# Patient Record
Sex: MALE | Race: WHITE | Employment: OTHER | URBAN - METROPOLITAN AREA
[De-identification: names, ages, dates, MRNs, and addresses within clinical notes are randomized per-mention and may not be internally consistent; named-entity substitution may affect disease eponyms.]

---

## 2022-09-30 NOTE — PROGRESS NOTES
10/4/22: New Pt here for Lt tongue lesion. Some discomfort when eating certain foods, some tingling. No difficulty swallowing. No change in voice. Drinks 3-4 bottles of water, 1-2 bottles of green tea, and very seldomly drinks alcohol. Weight is stable.

## 2022-10-04 ENCOUNTER — OFFICE VISIT (OUTPATIENT)
Dept: ENT CLINIC | Age: 67
End: 2022-10-04
Payer: MEDICARE

## 2022-10-04 VITALS — WEIGHT: 229 LBS | BODY MASS INDEX: 32.78 KG/M2 | HEIGHT: 70 IN

## 2022-10-04 DIAGNOSIS — C02.9 TONGUE CANCER (HCC): ICD-10-CM

## 2022-10-04 DIAGNOSIS — K14.8 TONGUE MASS: Primary | ICD-10-CM

## 2022-10-04 PROCEDURE — 1123F ACP DISCUSS/DSCN MKR DOCD: CPT | Performed by: OTOLARYNGOLOGY

## 2022-10-04 PROCEDURE — G8427 DOCREV CUR MEDS BY ELIG CLIN: HCPCS | Performed by: OTOLARYNGOLOGY

## 2022-10-04 PROCEDURE — G8417 CALC BMI ABV UP PARAM F/U: HCPCS | Performed by: OTOLARYNGOLOGY

## 2022-10-04 PROCEDURE — G8484 FLU IMMUNIZE NO ADMIN: HCPCS | Performed by: OTOLARYNGOLOGY

## 2022-10-04 PROCEDURE — 99204 OFFICE O/P NEW MOD 45 MIN: CPT | Performed by: OTOLARYNGOLOGY

## 2022-10-04 PROCEDURE — 31575 DIAGNOSTIC LARYNGOSCOPY: CPT | Performed by: OTOLARYNGOLOGY

## 2022-10-04 PROCEDURE — 1036F TOBACCO NON-USER: CPT | Performed by: OTOLARYNGOLOGY

## 2022-10-04 PROCEDURE — 3017F COLORECTAL CA SCREEN DOC REV: CPT | Performed by: OTOLARYNGOLOGY

## 2022-10-04 RX ORDER — LORATADINE 10 MG/1
10 TABLET ORAL DAILY
COMMUNITY

## 2022-10-04 RX ORDER — PHENOL 1.4 %
AEROSOL, SPRAY (ML) MUCOUS MEMBRANE
COMMUNITY
End: 2022-10-14

## 2022-10-04 RX ORDER — MULTIVITAMIN WITH IRON
100 TABLET ORAL DAILY
COMMUNITY

## 2022-10-04 RX ORDER — ACETAMINOPHEN 160 MG
1 TABLET,DISINTEGRATING ORAL DAILY
COMMUNITY

## 2022-10-04 RX ORDER — ZINC GLUCONATE 50 MG
100 TABLET ORAL 2 TIMES DAILY
COMMUNITY

## 2022-10-04 RX ORDER — MULTIVIT WITH MINERALS/LUTEIN
1000 TABLET ORAL DAILY
COMMUNITY

## 2022-10-04 NOTE — LETTER
Dear Dr Ryan Cadena, MD Teressa Dyson DDS     We had the pleasure of seeing Juvencio Born, 1955 here    on 10/4/2022  Please see below for review of care and plans. Chief complaint- No diagnosis found. History of Present Illness- 70 y/o male presents to our clinic for evaluation of tongue lesion. Left lateral tongue. Noticed approximately 2-3 months ago. Non painful, non tender. Tolerating PO intake. Denies cervical lymphadenopathy. Denies weight loss. Denies appetite change. Denies difficulty swallowing or drinking. Has had previous biopsy of tongue lesion which came back benign. Non smoker. Recent biopsy- pos scca    Review of Systems- No drainage, discharge, or headache. Complete 10 system ROS completed and negative except as noted above. Physical Examination-   Vital Signs-Ht 5' 10\" (1.778 m)   Wt 229 lb (103.9 kg)   BMI 32.86 kg/m²     Ears- Tympanic membranes clear bilaterally. No middle ear effusion. No pre or post auricular tenderness. Nose- Nasal mucosa clear and dry. No significant septal deviation or inferior turbinate hypertrophy. Oral Cavity/Oropharynx- Floor of mouth and tongue are soft and nontender. No posterior pharyngeal erythema. + gag reflex left lateral tongue/floor of mouth ~2cm plaque like lesion with submucosal firmness. Neck- Soft and nontender. No masses, lesions, lymphadenopathy, or thyroid nodules appreciated. Cranial Nerve- Cranial nerves II to XII intact. Extraocular muscles intact. No gross motor visual deficits. No spontaneous nystagmus. Face- No facial skin tenderness to palpation. Heart- No cyanosis, regular  Lungs- No stridor, no intercostal accessory muscle use  General- The patient is in no acute distress. A&O x3    Nasopharyngoscopy  Procedure note- after pt verbally consented, was sprayed nasally with 1:1 neosynephrine and xylocaine. Scope was passed and found nasal cavity with no lesion.  nasopharynx clear, tonsil wnl without asymmetry, tongue mobile and no masses, vocal cords mobile bilaterally with full ab and ad duction. Hypopharynx clear, open and no masses. Medical Decision Making and Treatment Plan. 1. Pt seen and examined, scope exam with grossly normal findings. 2. FNA of tongue done in office today. 3. Will schedule for panendoscopy. 4. Follow up in 1 week for path results review. 5.  R/B/A of surgery discussed with pt. Pt understood, consent signed, and would like to proceed to surgery. No personal or family history of bleeding or adverse reaction to anesthesia. 6. Suspect will  need upfront surgical therapy with recon and then adjuvant therapy based upon pathology  7. Present at tumor board  8. Pan consult      I spent 50 minutes with the patients care and >50% of this time on counseling or coordinating care    Thank you for the opportunity to take part in the care of this very pleasant patient, Juan Carlos Coppolaelin  Sincerely,            Mor Rosales.  Fernando Castro M.D., Ph.D., [facility]   Department of Otolaryngology-Head and Neck Surgery  Chief of Head & Neck Surgical Oncology  Director Head & Neck Oncology Service Line

## 2022-10-04 NOTE — PATIENT INSTRUCTIONS
SURGERY:_____/_____/_____    Nothing to eat or drink after midnight the night before surgery unless surgery is at [facility] or otherwise instructed by the hospital.    DO NOT TAKE ANY ASPIRIN PRODUCTS 7 days prior to surgery. Tylenol only. No Advil, Motrin, Aleve, or Ibuprofen. IF YOU ARE ON BLOOD THINNERS (PLAVIX, COUMADIN, ELIQUIS ETC) THESE WILL ALSO NEED TO BE HELD. Any illegal drugs in your system (including Marijuana even if legally prescribed) will result in your surgery being cancelled. Please be sure to check with our office or the hospital on time frame for the drugs to be out of your system. SHOULD YOUR INSURANCE CHANGE AT ANY TIME YOU MUST CONTACT OUR OFFICE. FAILURE TO DO SO MAY RESULT IN YOUR SURGERY BEING RESCHEDULED OR YOU MAY BE CHARGED AS SELF-PAY. Due to the high demand for surgery at our practice, if you cancel or reschedule your surgery two (2) times we may not reschedule you. If you need FMLA or Short Term Disability paperwork completed for your surgery, please complete your portion, ensure your name and date of birth are on them and fax them to [phone] asap. Paperwork can take up to 2 weeks to be completed. If you have any questions or concerns regarding your surgery, please contact the Surgery SchedulerFlatricia Ronquillo at [phone] option 2. If you need medical clearance, you are responsible to contact your physician(s) to schedule an appointment for clearance. If clearance is not completed within 30 days of your surgery it may be cancelled. Our office will fax the appropriate forms that need to be completed to your physician(s). The location of your surgery will call you the day prior to your surgery date to let you know what time you have to be there and any other details. (they usually don't call until late afternoon- early evening.)- IF YOU HAVE QUESTIONS REGARDING THE TIME OF YOUR SURGERY, PLEASE CALL THE FACILITY YOU ARE SCHEDULED AT.            [facility], [facility] will call you a couple days prior to surgery and give you further instructions, if you have any questions, you can reach them at [phone] (per Pre-Admission testing, EKG is required for all patients age 53+, have a diagnosis of hypertension, diabetes, or on dialysis). [address], [address] [facility], [place] will call you a couple days prior to surgery and give you further instructions, if you have any questions, you can reach them at [phone] (per Pre-Admission testing, EKG is required for all patients age 53+, have a diagnosis of hypertension, diabetes, or on dialysis). [address] [facility] Angelina Ramirez will call you a couple days prior to surgery and give you further instructions, if you have any questions, you can reach them at [phone] (per Pre-Admission testing, EKG is required for all patients age 53+, have a diagnosis of hypertension, diabetes, or on dialysis). [facility]), [address],  [facility], [address] [place] [address] will call you a couple days prior to surgery and give you further instructions, if you have any questions, you can reach them at [phone]      Pre-Surgery/Anesthesia Video ([facility] ONLY)  Located on [address]:  1. Scroll over Health Information  2. Select Audio and Video  3. Select [employer]  4. Select Your child and Anesthesia  5.  Select Pre surgery [facility]    FOOD RESTRICTIONS--[facility] ONLY  Solid Food/Milk Products --------- Stop 8 hours prior to Surgery  Formula --------- Stop 6 hours prior to Surgery  Breast Milk ------- Stop 4 hours prior to Surgery  Clear liquids (water, Gatorade, Pedialyte) - Stop 2 hours prior to Surgery

## 2022-10-04 NOTE — PROGRESS NOTES
Dear MD Royal Martinez DDS     We had the pleasure of seeing Zara Martinez, 1955 here    on 10/4/2022  Please see below for review of care and plans. Chief complaint- No diagnosis found. History of Present Illness- 70 y/o male presents to our clinic for evaluation of tongue lesion. Left lateral tongue. Noticed approximately 2-3 months ago. Non painful, non tender. Tolerating PO intake. Denies cervical lymphadenopathy. Denies weight loss. Denies appetite change. Denies difficulty swallowing or drinking. Has had previous biopsy of tongue lesion which came back benign. Non smoker. Recent biopsy- pos scca    Review of Systems- No drainage, discharge, or headache. Complete 10 system ROS completed and negative except as noted above. Physical Examination-   Vital Signs-Ht 5' 10\" (1.778 m)   Wt 229 lb (103.9 kg)   BMI 32.86 kg/m²     Ears- Tympanic membranes clear bilaterally. No middle ear effusion. No pre or post auricular tenderness. Nose- Nasal mucosa clear and dry. No significant septal deviation or inferior turbinate hypertrophy. Oral Cavity/Oropharynx- Floor of mouth and tongue are soft and nontender. No posterior pharyngeal erythema. + gag reflex left lateral tongue/floor of mouth ~2cm plaque like lesion with submucosal firmness. Neck- Soft and nontender. No masses, lesions, lymphadenopathy, or thyroid nodules appreciated. Cranial Nerve- Cranial nerves II to XII intact. Extraocular muscles intact. No gross motor visual deficits. No spontaneous nystagmus. Face- No facial skin tenderness to palpation. Heart- No cyanosis, regular  Lungs- No stridor, no intercostal accessory muscle use  General- The patient is in no acute distress. A&O x3    Nasopharyngoscopy  Procedure note- after pt verbally consented, was sprayed nasally with 1:1 neosynephrine and xylocaine. Scope was passed and found nasal cavity with no lesion.  nasopharynx clear, tonsil wnl without asymmetry, tongue mobile and no masses, vocal cords mobile bilaterally with full ab and ad duction. Hypopharynx clear, open and no masses. Medical Decision Making and Treatment Plan. Pt seen and examined, scope exam with grossly normal findings. FNA of tongue done in office today. Will schedule for panendoscopy. Follow up in 1 week for path results review. R/B/A of surgery discussed with pt. Pt understood, consent signed, and would like to proceed to surgery. No personal or family history of bleeding or adverse reaction to anesthesia. Suspect will  need upfront surgical therapy with recon and then adjuvant therapy based upon pathology  Present at tumor board  Pan consult      I spent 50 minutes with the patients care and >50% of this time on counseling or coordinating care    Thank you for the opportunity to take part in the care of this very pleasant patient, Nir Mehta  Sincerely,            Rigoberto Washburn.  Isabela Dunn M.D., Ph.D., [facility]   Department of Otolaryngology-Head and Neck Surgery  Chief of Head & Neck Surgical Oncology  Director Head & Neck Oncology Service Line

## 2022-10-05 ENCOUNTER — TELEPHONE (OUTPATIENT)
Dept: ENT CLINIC | Age: 67
End: 2022-10-05

## 2022-10-05 ENCOUNTER — PREP FOR PROCEDURE (OUTPATIENT)
Dept: ENT CLINIC | Age: 67
End: 2022-10-05

## 2022-10-05 DIAGNOSIS — K14.8 TONGUE MASS: Primary | ICD-10-CM

## 2022-10-05 DIAGNOSIS — C04.1 MALIGNANT NEOPLASM OF LATERAL FLOOR OF MOUTH (HCC): ICD-10-CM

## 2022-10-05 DIAGNOSIS — Z01.812 PRE-OPERATIVE LABORATORY EXAMINATION: Primary | ICD-10-CM

## 2022-10-05 DIAGNOSIS — C02.9 TONGUE CANCER (HCC): Primary | ICD-10-CM

## 2022-10-05 DIAGNOSIS — Z01.812 PRE-OPERATIVE LABORATORY EXAMINATION: ICD-10-CM

## 2022-10-05 LAB
BUN BLDV-MCNC: 16 MG/DL (ref 6–23)
CREAT SERPL-MCNC: 1.1 MG/DL (ref 0.7–1.2)
GFR AFRICAN AMERICAN: >60
GFR NON-AFRICAN AMERICAN: >60 ML/MIN/1.73

## 2022-10-05 NOTE — TELEPHONE ENCOUNTER
Called spoke with patient CT and PET scan are scheduled on 10/10/22 at [facility] arrive at 2:00 pm nothing to eat or drink after 8:00 am

## 2022-10-05 NOTE — TELEPHONE ENCOUNTER
Prior Authorization Form:      DEMOGRAPHICS:                     Patient Name:  Ludy Osei  Patient :  1955            Insurance:  Payor: MEDICARE / Plan: MEDICARE PART A AND B / Product Type: *No Product type* /   Insurance ID Number:    Payer/Plan Subscr  Sex Relation Sub. Ins. ID Effective Group Num   1.  [address] [facility] 1955 Male Self [id number] 10/1/22                                    [address]          DIAGNOSIS & PROCEDURE:                       Procedure/Operation: Direct Laryngoscopy, Bronchoscopy, Esophagoscopy           CPT Code: 73600, 32374, 44724    Diagnosis:  Tongue mass    ICD10 Code: K14.8    Location:  [facility]    Surgeon:  Dr. Kika Stephen INFORMATION:                          Date: 10/13/22    Time: n/a              Anesthesia:  General                                                       Status:  Outpatient        Special Comments:  pathology       Electronically signed by Maia Torres MA on 10/5/2022 at 8:19 AM

## 2022-10-07 RX ORDER — ASPIRIN 81 MG/1
81 TABLET ORAL DAILY
COMMUNITY

## 2022-10-07 NOTE — PROGRESS NOTES
[address]   PRE-ADMISSION TESTING GENERAL INSTRUCTIONS  PAT Phone Number: [phone]      GENERAL INSTRUCTIONS:    [x] Antibacterial Soap Shower Night before and/or AM of surgery. [] CHG Wipes instruction sheet and wipes given. [] Hibiclens shower the night before and the morning of surgery.   -Do not use Hibiclens on your face or head. [x] Do not wear makeup, lotions, powders, deodorant. [x] Nothing by mouth after midnight; including gum, candy, mints, or water. [x] You may brush your teeth, gargle, but do NOT swallow water. [x] No tobacco products, illegal drugs, or alcohol within 24 hours of your surgery. [x] Jewelry or valuables should not be brought to the hospital. All body and/or tongue piercing's must be removed prior to arriving to hospital. No contact lens or hair pins. [x] Arrange transportation with a responsible adult  to and from the hospital. Arrange for someone to be with you for the remainder of the day and for 24 hours after your procedure due to having had anesthesia. -Who will be your  for transportation?___Karen_______________         -Who will be staying with you for 24 hrs after your procedure?____Karen______________  [x] Bring insurance card and photo ID.  [] Bring copy of living will or healthcare power of  papers to be placed in your electronic record. [] Urine Pregnancy test will be preformed the day of surgery. A specimen sample may be brought from home. [] Transfusion Bracelet: Please bring with you to hospital, day of surgery. PARKING INSTRUCTIONS:     [x] ARRIVAL DATE & TIME: 10/13/22 at 1300  [x] Enter into the [facility]. Two people may accompany you. Masks are not required but are recommended. [x] Parking Lot \"I\" is where you will park. It is located on the corner of [address] and [address]. The entrance is on [address].    Upon entering the parking lot, a voucher ticket will print    EDUCATION INSTRUCTIONS:        [] Knee or Hip replacement booklet & exercise pamphlets given. [] [address] placed in chart. [] Pre-admission Testing educational folder given  [] Incentive Spirometry,coughing & deep breathing exercises reviewed. [] Medication information sheet(s)   [] Fluoroscopy-Xray used in surgery reviewed with patient. Educational pamphlet placed in chart. [] Pain: Post-op pain is normal and to be expected. You will be asked to rate your pain from 0-10. [] Joint camp offered. [] Joint replacement booklets given.  [] Spine Navigator to see in PAT. [] Other:___________________________    MEDICATION INSTRUCTIONS:    [x] Bring a complete list of your medications, please write the last time you took the medicine, give this list to the nurse in Pre-Op. [x] Take only the following medications the morning of surgery with 1-2 ounces of water: meto  [x] Stop all herbal supplements and vitamins 5 days before surgery. Stop NSAIDS 7 days before surgery. [] DO NOT take any diabetic medicine the morning of surgery. Follow instructions for insulin the day before surgery. [] If you are diabetic and your blood sugar is low or you feel symptomatic, you may drink 1-2 ounces of apple juice or take a glucose tablet.            -The morning of your procedure, you may call the pre-op area if you have concerns about your blood sugar [phone]. [] Use your inhalers the morning of surgery. Bring your emergency inhaler with you day of surgery. [x] Follow physician instructions regarding any blood thinners you may be taking. Last dose of aspirin to be 10/6    WHAT TO EXPECT:    [x] The day of surgery you will be greeted and checked in by the [facility].  In addition, you will be registered in the [facility] by a Patient Access Representative. Please bring your photo ID and insurance card.  A nurse will greet you in accordance to the time you are needed in the pre-op area to prepare you for surgery. Please do not be discouraged if you are not greeted in the order you arrive as there are many variables that are involved in patient preparation. Your patience is greatly appreciated as you wait for your nurse. Please bring in items such as: books, magazines, newspapers, electronics, or any other items  to occupy your time in the waiting area. [x]  Delays may occur with surgery and staff will make a sincere effort to keep you informed of delays. If any delays occur with your procedure, we apologize ahead of time for your inconvenience as we recognize the value of your time.

## 2022-10-10 ENCOUNTER — HOSPITAL ENCOUNTER (OUTPATIENT)
Dept: NUCLEAR MEDICINE | Age: 67
Discharge: HOME | End: 2022-10-10
Payer: MEDICARE

## 2022-10-10 ENCOUNTER — HOSPITAL ENCOUNTER (OUTPATIENT)
Dept: CT IMAGING | Age: 67
Discharge: HOME | End: 2022-10-10
Payer: MEDICARE

## 2022-10-10 DIAGNOSIS — C04.1 MALIGNANT NEOPLASM OF LATERAL FLOOR OF MOUTH (HCC): ICD-10-CM

## 2022-10-10 DIAGNOSIS — C02.9 TONGUE CANCER (HCC): ICD-10-CM

## 2022-10-10 DIAGNOSIS — K14.8 TONGUE MASS: ICD-10-CM

## 2022-10-10 PROCEDURE — 70491 CT SOFT TISSUE NECK W/DYE: CPT

## 2022-10-10 PROCEDURE — 78815 PET IMAGE W/CT SKULL-THIGH: CPT

## 2022-10-10 PROCEDURE — 3430000000 HC RX DIAGNOSTIC RADIOPHARMACEUTICAL: Performed by: RADIOLOGY

## 2022-10-10 PROCEDURE — A9552 F18 FDG: HCPCS | Performed by: RADIOLOGY

## 2022-10-10 PROCEDURE — 6360000004 HC RX CONTRAST MEDICATION: Performed by: RADIOLOGY

## 2022-10-10 RX ORDER — FLUDEOXYGLUCOSE F 18 200 MCI/ML
15 INJECTION, SOLUTION INTRAVENOUS
Status: COMPLETED | OUTPATIENT
Start: 2022-10-10 | End: 2022-10-10

## 2022-10-10 RX ADMIN — FLUDEOXYGLUCOSE F 18 15 MILLICURIE: 200 INJECTION, SOLUTION INTRAVENOUS at 22:03

## 2022-10-10 RX ADMIN — IOPAMIDOL 75 ML: 755 INJECTION, SOLUTION INTRAVENOUS at 14:45

## 2022-10-10 NOTE — PROGRESS NOTES
Radiation Oncology Consult Note         10/11/2022    Clarisse Gee  69 y.o.   1955    REFERRING PROVIDER: Avery Anne    PCP:  Keya Olsen MD    CHIEF COMPLAINT: Lesion on Left Lateral Tongue    DIAGNOSIS: Left Lateral Tongue Lesion, ? malignancy    STAGING: Cancer Staging  No matching staging information was found for the patient. HISTORY OF PRESENT ILLNESS: Mr. Clarisse Gee  is a 69y.o. year old male during a 6-month follow-up with his family dentist Dr. Shameka Mcintyre a lesion was noted along the left lateral border of his tongue. He was sent to a local dermatologist for evaluation who ultimately referred him to Dr. Roberto Rodriguez who in July 2020 performed a resection of a left lateral tongue lesion. My review of her pathology dated 7/28/2020 noted squamous hyperplasia with hyperkeratosis without evidence of dysplasia or malignancy. To his current findings Dr. Kaveh Sawyer referred him to Dr. Avery Anne who on 10/4/2022 performed a fine-needle aspiration in the office. This revealed scant cells inconclusive for malignancy. He of the neck on 10/10/2022 notes a fullness involving the lateral tongue without adenopathy. A PET/CT on a similar date showed modest uptake involving the left lateral tongue without obvious lymphadenopathy. The patient is scheduled to undergo panendoscopy on 10/13/2022 and comes to us now for consideration of treatment option discussions. PAST MEDICAL HISTORY:      Diagnosis Date    Acid reflux disease     Hyperlipidemia     Hypertension        PAST SURGICAL HISTORY:      Procedure Laterality Date    COLONOSCOPY  09/27/2022    CYST REMOVAL      upper mid chest    HERNIA REPAIR  2021       No Known Allergies    MEDICATIONS:  Medications reviewed and reconciled.   Current Outpatient Medications   Medication Sig Dispense Refill    aspirin 81 MG EC tablet Take 81 mg by mouth daily      loratadine (CLARITIN) 10 MG tablet Take 10 mg by mouth daily PRN      Multiple Vitamins-Minerals (CENTRUM SILVER PO) Take by mouth      Ascorbic Acid (VITAMIN C) 1000 MG tablet Take 1,000 mg by mouth daily      zinc 50 MG TABS tablet Take 50 mg by mouth daily      Cholecalciferol (VITAMIN D3) 50 MCG (2000 UT) CAPS Take by mouth      vitamin B-6 (PYRIDOXINE) 100 MG tablet Take 100 mg by mouth daily      Cyanocobalamin (VITAMIN B-12) 5000 MCG SUBL Place under the tongue      Melatonin 10 MG TABS Take by mouth      atorvastatin (LIPITOR) 10 MG tablet Take 10 mg by mouth daily. omeprazole (PRILOSEC) 20 MG capsule Take 20 mg by mouth daily. metoprolol (LOPRESSOR) 50 MG tablet Take 100 mg by mouth daily       No current facility-administered medications for this encounter. FAMILY HISTORY:  Family History   Problem Relation Age of Onset    Breast Cancer Mother     Heart Disease Mother     Other Father         heart valve replacement       SOCIAL HISTORY:       reports that he has never smoked. He has never used smokeless tobacco..   reports no history of alcohol use. .   reports no history of drug use. REVIEW OF SYSTEMS:  Obtained from the patient, chart review and nursing assessment.   General ROS: positive for  - fatigue  Psychological ROS: positive for - anxiety  Ophthalmic ROS: negative  ENT ROS: positive for - oral lesions, left earache  negative for - epistaxis, hearing change, nasal discharge, nasal polyps, sore throat, tinnitus, visual changes, or vocal changes  Allergy and Immunology ROS: negative  Hematological and Lymphatic ROS: negative  Endocrine ROS: negative  Breast ROS: negative  Respiratory ROS: no cough, shortness of breath, or wheezing  Cardiovascular ROS: no chest pain or dyspnea on exertion  Gastrointestinal ROS: no abdominal pain, change in bowel habits, or black or bloody stools  Genito-Urinary ROS: no dysuria, trouble voiding, or hematuria  Musculoskeletal ROS: negative  Neurological ROS: no TIA or stroke symptoms  Dermatological ROS: negative    PHYSICAL EXAMINATION:      Vitals:    10/11/22 0855   BP: (!) 142/80   Pulse: 77   Resp: 18   Temp: 97.4 °F (36.3 °C)   TempSrc: Tympanic   Weight: 225 lb 9 oz (102.3 kg)       Wt Readings from Last 3 Encounters:   10/11/22 225 lb 9 oz (102.3 kg)   10/04/22 229 lb (103.9 kg)       KARNOSKY PERFORMANCE STATUS:      Constitutional: A well developed, well nourished 69 y.o. male who is alert, oriented, cooperative and in no apparent distress. EENT: EOMI SAM. No icterus. No conjunctival injections. External canals are patent and no discharge was appreciated. There is full mobility of the oral tongue. There is a soft tissue deficit involving the left lateral oral tongue. This area is firm but without an obvious mucosal lesion. The soft palate elevates bilaterally. Visual inspection of the remainder of the oral cavity is unremarkable. Oral and dentition is good. Bimanual palpation of the gingival buccal surfaces floor mouth base of tongue is unremarkable. Neck: Supple without thyromegaly or cervical lymphadenopathy. Respiratory: Breath sounds bilaterally were clear to auscultation. No wheezes, rhonchi or rales. Breasts: Deferred    Cardiovascular: Regular without murmur. Abdomen: Obese, rounded and soft without organomegaly. No rebound, guarding or  rigidity. Lymphatic: No lymphadenopathy. Musculoskeletal: Ambulates without assistance. No gross muscle weakness. Muscle size, tone and strength are normal. No involuntary movements. Extremities:  No lower extremity edema, ulcerations, tenderness, varicosities or erythema. Coordination appears adequate. Sensory function appears intact. Skin:  Warm and dry. Examination of color, turgor and pigmentation was relatively normal. No bruises or skin rashes. Neurological/Psychiatric: General behavior, level of consciousness, thought content is normal. The patient's emotional status is normal.  Cranial nerves II-XII are grossly intact.         RADIATION SAFETY AND ONCOLOGIC TREATMENT SUPPORT:    - Previous radiation history:  No  - History of autoimmune or connective tissue disease:  No  - Nutritional support/ PEG:  Not applicable  - Dental evaluation:  Not applicable  -  requested:  Not asked for.  - Oncology Nurse navigator requested:  - Transportation for daily treatment:  Self    TUMOR MARKERS: No results found for: PSA, CEA, , NP1886,     IMAGING REVIEWED:  10/10/2022 CT NECK      10/10/2022 PET-CT        PATHOLOGY REVIEWED:  10/4/2022 FNA Left Lateral Tongue:  DIAGNOSIS   INCONCLUSIVE FOR MALIGNANT CELLS     Few groups of severely atypical squamous cells present. Cellblock is noncontributory,     COMMENT:   The overall low cellularity precludes definitive interpretation. Intradepartmental consultation obtained. IMPRESSION:   68-year-old male with a left lateral oral tongue lesion, possible malignant    DISCUSSION/PLAN:     I had a extended discussion with Maryjane Dexter and his wife Reina James who was in attendance today. I reviewed his available clinical and radiographic history including the resection of a squamous hyperplasia from his left lateral oral tongue from 2020. On my examination today, I find fullness in this area without a mucosal lesion. My review of his CT of the neck is an PET/CT which has not officially been read demonstrates uptake in a suspicious lesion involving the area in question. I did review these with the patient today. The differential diagnosis ranges from a nonmalignant process including infection to cancer. Most Common cancer in this region would be a squamous cell carcinoma. Unfortunately his FNA was inconclusive and I understand he is scheduled to undergo a PET in there with repeat biopsy on 10/13/2022.     I did inform the patient that the next step would be to establish a diagnosis and the most common treatment options for early stage tumors in this location are summarized below:     Primary surgery and definitive radiation therapy are treatment options for early-stage oral tongue cancer. The vast majority of patients are treated with surgical resection although there has been a tradition of brachytherapy or external beam for well-differentiated tumors. Patients who are ineligible for en-bloc surgical resection (I.ie partial or total glossectomy), radiation therapy has been used although there has not been a head-to-head randomized trial of these 2 modalities. The 5-year overall survival of patients with early stage tongue cancers have been reported quite good from a single institution, 5-year survival using a population based approach (NC DB) more modest [Cancer. 2008;112(2):345; Luis Hernandez. 1998;19(1):24]. In the postoperative setting, depth of invasion (DOI) has recently gained prominence as a consideration for postoperative adjuvant therapy along with margin status, perineural invasion and lymphovascular space involvement [Clin Oncol (R Jacob Radiol). 1996;8(6):384; Radiother Oncol.1996;39(1):9]. It is clear that close and positive margins (less than 5 mm) may portend a worse prognosis and therefore every attempt should be made to obtain negative margins including attempts of re-resection if functionally and surgically feasible. In instances where this is not possible and there are high-risk features, adjuvant radiation plus or minus chemotherapy has used as shown to improve local control [ANGELA Otolaryngol Head Neck Surg. 2017;143(6):555]. We will discuss this case in our multidisciplinary head and neck tumor board with final recommendations to follow. That said, I believe surgical resection is a reasonable approach in this patient. Furthermore, a risk-adapted approach of postoperative radiation therapy can be used based on the pathologic findings post resection.   Specifically, depth of invasion, tumor size, perineural invasion, lymphovascular space involvement and bone involvement are all criteria that may, in combination, may result in adjuvant treatment [Head Neck. 2004;26(11):984; Cancer. 2013 Mar;119(6):1168-76. Epub 2012 Nov 26]. NCCN guidelines, version 2020 is used to help review treatment recommendations. Procedures and process involved with CT simulation and daily radiation treatments were explained. Toxicities, both expected and less common, were reviewed in detail. Questions were answered to their apparent satisfaction. I spent 65 minutes with this patient in our face-to-face encounter the majority of which was spent coordinating care specific to his case. Thank you for the opportunity to participate in multidisciplinary management of this remarkable and pleasant patient. Sameer Washington MD, [address]    Department of Radiation Oncology  [address]: [phone] (AQB: [phone])  [address] [place] [address]: [phone] (N: [phone])  [facility]:  [phone] (JRS:  [phone])    NOTE: This report was transcribed using voice recognition software. Every effort was made to ensure accuracy; however, inadvertent computerized transcription errors may be present.

## 2022-10-11 ENCOUNTER — HOSPITAL ENCOUNTER (OUTPATIENT)
Dept: RADIATION ONCOLOGY | Age: 67
Discharge: HOME | End: 2022-10-11
Payer: MEDICARE

## 2022-10-11 VITALS
BODY MASS INDEX: 32.36 KG/M2 | RESPIRATION RATE: 18 BRPM | WEIGHT: 225.56 LBS | SYSTOLIC BLOOD PRESSURE: 142 MMHG | DIASTOLIC BLOOD PRESSURE: 80 MMHG | HEART RATE: 77 BPM | TEMPERATURE: 97.4 F

## 2022-10-11 PROCEDURE — 99205 OFFICE O/P NEW HI 60 MIN: CPT | Performed by: SPECIALIST

## 2022-10-11 PROCEDURE — 99205 OFFICE O/P NEW HI 60 MIN: CPT

## 2022-10-11 NOTE — PROGRESS NOTES
Rc Cervantes  1955 69 y.o. Referring Physician: Cristofer Marcos    PCP: Maricel Moon MD     Vitals:    10/11/22 0855   BP: (!) 142/80   Pulse: 77   Resp: 18   Temp: 97.4 °F (36.3 °C)        Wt Readings from Last 3 Encounters:   10/11/22 225 lb 9 oz (102.3 kg)   10/04/22 229 lb (103.9 kg)        Body mass index is 32.36 kg/m². Chief Complaint: No chief complaint on file. Cancer Staging  No matching staging information was found for the patient. Prior Radiation Therapy? NO    Concurrent Chemo/radiation? NO    Prior Chemotherapy? NO    Prior Hormonal Therapy? NO    Head and Neck Cancer? No, patient does NOT have HN cancer. L      Current Outpatient Medications   Medication Sig Dispense Refill    aspirin 81 MG EC tablet Take 81 mg by mouth daily      loratadine (CLARITIN) 10 MG tablet Take 10 mg by mouth daily PRN      Multiple Vitamins-Minerals (CENTRUM SILVER PO) Take by mouth      Ascorbic Acid (VITAMIN C) 1000 MG tablet Take 1,000 mg by mouth daily      zinc 50 MG TABS tablet Take 50 mg by mouth daily      Cholecalciferol (VITAMIN D3) 50 MCG (2000 UT) CAPS Take by mouth      vitamin B-6 (PYRIDOXINE) 100 MG tablet Take 100 mg by mouth daily      Cyanocobalamin (VITAMIN B-12) 5000 MCG SUBL Place under the tongue      Melatonin 10 MG TABS Take by mouth      atorvastatin (LIPITOR) 10 MG tablet Take 10 mg by mouth daily. omeprazole (PRILOSEC) 20 MG capsule Take 20 mg by mouth daily. metoprolol (LOPRESSOR) 50 MG tablet Take 100 mg by mouth daily       No current facility-administered medications for this encounter.        Past Medical History:   Diagnosis Date    Acid reflux disease     Hyperlipidemia     Hypertension        Past Surgical History:   Procedure Laterality Date    COLONOSCOPY  09/27/2022    CYST REMOVAL      upper mid chest    HERNIA REPAIR  2021       Family History   Problem Relation Age of Onset    Breast Cancer Mother     Heart Disease Mother Other Father         heart valve replacement       Social History     Socioeconomic History    Marital status:      Spouse name: Not on file    Number of children: Not on file    Years of education: Not on file    Highest education level: Not on file   Occupational History    Not on file   Tobacco Use    Smoking status: Never    Smokeless tobacco: Never   Vaping Use    Vaping Use: Never used   Substance and Sexual Activity    Alcohol use: No    Drug use: No    Sexual activity: Not Currently   Other Topics Concern    Not on file   Social History Narrative    Not on file     Social Determinants of Health     Financial Resource Strain: Not on file   Food Insecurity: Not on file   Transportation Needs: Not on file   Physical Activity: Not on file   Stress: Not on file   Social Connections: Not on file   Intimate Partner Violence: Not on file   Housing Stability: Not on file           Occupation: manufacturing company  Retired:  Yes        [address]: <<For Level 5, 10 or more systems>> Enrique Dowling is a very pleasant 69years old male, he is here today with his wife Jayant Nelson to discuss possible radiation treatment to head/neck R/T left lateral tongue lesion. He is alert and oriented x 4, denies pain, ambulatory without any assistance, he is pretty heathy. Patient denies difficulty swallowing, he can feel it's there. Patient states he went to see his dentist for 6 months routine examination and he noticed this lesion. He was then referred to Dermatology (Dr Geovanny Nicole) then referred to DDS  Dr Davida Yo and then referred to Dr Jose Anderson (ENT). Dr Jose Anderson saw him at his office 10/04/22 and performed FNA of the left tongue lesion;  pathology results was inconclusive. 10/10/22 He underwent CT of the soft tissue neck and PET scan, both tests report is not completed as of yet. He is scheduled for Panendoscopy on 10/13/22 with Dr Jose Anderson at [facility] (1-), then he will see him at his office 10/20/22.   He is also scheduled for consultation with Dr Harmeet Pope 10/14/22. Approximately spent > 20 minutes with patient and wife, answered all questions from nursing perspective, they both verbalizes understanding. Pacemaker/Defibulator/ICD:  No    Mediport: No        FALLS RISK SCREENING ASSESSMENT    Instructions:  Assess the patient and enter the appropriate indicators that are present for fall risk identification. Total the numbers entered and assign a fall risk score from Table 2.  Reassess patient at a minimum every 12 weeks or with status change. Assessment   Date  10/11/2022     1. Mental Ability: confusion/cognitively impaired No - 0       2. Elimination Issues: incontinence, frequency No - 0       3. Ambulatory: use of assistive devices (walker, cane, off-loading devices), attached to equipment (IV pole, oxygen) No - 0     4. Sensory Limitations: dizziness, vertigo, impaired vision No - 0       5. Age 72 years or greater - 1       10. Medication: diuretics, strong analgesics, hypnotics, sedatives, antihypertensive agents   Yes - 3   7. Falls:  recent history of falls within the last 3 months (not to include slipping or tripping)   No - 0   TOTAL 4    If score of 4 or greater was education given? Yes       TABLE 2   Risk Score Risk Level Plan of Care   0-3 Little or  No Risk 1. Provide assistance as indicated for ambulation activities  2. Reorient confused/cognitively impaired patient  3. Call-light/bell within patient's reach  4. Chair/bed in low position, stretcher/bed with siderails up except when performing patient care activities  5. Educate patient/family/caregiver on falls prevention  6.  Reassess in 12 weeks or with any noted change in patient condition which places them at a risk for a fall   4-6 Moderate Risk 1. Provide assistance as indicated for ambulation activities  2. Reorient confused/cognitively impaired patient  3. Call-light/bell within patient's reach  4.   Chair/bed in low position, stretcher/bed with siderails up except when performing patient care activities  5. Educate patient/family/caregiver on falls prevention  6. Falls risk precaution (Yellow sticker Level II) placed on patient chart   7 or   Higher High Risk 1. Place patient in easily observable treatment room  2. Patient attended at all times by family member or staff  3. Provide assistance as indicated for ambulation activities  4. Reorient confused/cognitively impaired patient  5. Call-light/bell within patient's reach  6. Chair/bed in low position, stretcher/bed with siderails up except when performing patient care activities  7. Educate patient/family/caregiver on falls prevention  8. Falls risk precaution (Yellow sticker Level III) placed on patient chart           MALNUTRITION RISK SCREENING ASSESSMENT    Instructions:  Assess the patient and enter the appropriate indicators that are present for nutrition risk identification. Total the numbers entered and assign a risk score. Follow the appropriate action for total score listed below. Assessment   Date  10/11/2022     Have you lost weight without trying? 0- No     Have you been eating poorly because of a decreased appetite? 0- No   3. Do you have a diagnosis of head and neck cancer? 0- No                                                                                    TOTAL 0          Score of 0-1: No action  Score 2 or greater:   For Non-Diabetic Patient: Recommend adding Ensure Complete 2 x daily and provide patient with Ensure wellness bag with coupons  For Diabetic Patient: Recommend adding Glucerna Shake 2 x daily and provide patient with Glucerna Wellness bag with coupons  Route to the dietitian via [address]    Are you having difficulty performing daily routine tasks due to fatigue or weakness (ie: bathing/showering, dressing, housework, meal prep, work, , etc): No     Do you have any arm flexibility/ROM restrictions, swelling or pain that limit activity: No     Any changes in memory, attention/focus that impact daily activities: No     Do you avoid participation in leisure/social activity due to weakness, fatigue or pain: No     ARE ANY OF THE ABOVE ARE ANSWERED YES: No          PT ASSESSMENT FOR REFERRAL    Have you had any recent falls in the past 2 months: No     Do you have difficulty going up/down stairs: No     Are you having difficulty walking: No     Do you often hold onto furniture/environmental supports or feel off balance when you are walking: No     Do you need to take rest breaks when you are walking: No     Any pain on a scale of 1-10 that limits your mobility: No 0/10    ARE ANY OF THE ABOVE ARE ANSWERED YES: No           PELVIC FLOOR DYSFUNCTION SCREENING ASSESSMENT    - Do you have any pelvic pain? No     - Do you have any fecal constipation or fecal incontinence? No     - Do you have any urinary incontinence or difficulty starting to urinate? No     ARE ANY OF THE ABOVE ARE ANSWERED YES: No          PREHAB AUDIOLOGY REFERRAL    - Is patient planned to receive Cisplatin? No. This patient is not planned to start Cisplatin. - Is patient planned to receive radiation therapy that may be directed toward auditory canals or nerves? No. Patient is not planned to start radiation therapy to auditory canals or nerves. - Is patient complaining of new onset hearing loss? No. Patient is not complaining of new onset hearing loss. Patient education given on radiation therapy, side effects and fall prevention safety utilizing slides and handouts. The patient expresses understanding and acceptance of instructions.  Marisol Apple RN 10/11/2022 9:09 AM           Marisol Apple RN

## 2022-10-12 ENCOUNTER — ANESTHESIA EVENT (OUTPATIENT)
Dept: OPERATING ROOM | Age: 67
End: 2022-10-12
Payer: MEDICARE

## 2022-10-12 NOTE — PROGRESS NOTES
Patient notified of time change for surgery scheduled on 10/13.   Scheduled at 11 am.  Arrival time 9:30 am.

## 2022-10-13 ENCOUNTER — HOSPITAL ENCOUNTER (OUTPATIENT)
Age: 67
Setting detail: OUTPATIENT SURGERY
Discharge: HOME | End: 2022-10-13
Attending: OTOLARYNGOLOGY | Admitting: OTOLARYNGOLOGY
Payer: MEDICARE

## 2022-10-13 ENCOUNTER — ANESTHESIA (OUTPATIENT)
Dept: OPERATING ROOM | Age: 67
End: 2022-10-13
Payer: MEDICARE

## 2022-10-13 VITALS
WEIGHT: 227 LBS | HEART RATE: 68 BPM | TEMPERATURE: 97 F | SYSTOLIC BLOOD PRESSURE: 156 MMHG | RESPIRATION RATE: 16 BRPM | DIASTOLIC BLOOD PRESSURE: 87 MMHG | OXYGEN SATURATION: 99 % | BODY MASS INDEX: 32.5 KG/M2 | HEIGHT: 70 IN

## 2022-10-13 DIAGNOSIS — K14.8 TONGUE MASS: ICD-10-CM

## 2022-10-13 DIAGNOSIS — G89.18 POST-OP PAIN: Primary | ICD-10-CM

## 2022-10-13 PROCEDURE — 43191 ESOPHAGOSCOPY RIGID TRNSO DX: CPT | Performed by: OTOLARYNGOLOGY

## 2022-10-13 PROCEDURE — 2580000003 HC RX 258: Performed by: STUDENT IN AN ORGANIZED HEALTH CARE EDUCATION/TRAINING PROGRAM

## 2022-10-13 PROCEDURE — 7100000001 HC PACU RECOVERY - ADDTL 15 MIN: Performed by: OTOLARYNGOLOGY

## 2022-10-13 PROCEDURE — 2500000003 HC RX 250 WO HCPCS

## 2022-10-13 PROCEDURE — 41116 EXCISION OF MOUTH LESION: CPT | Performed by: OTOLARYNGOLOGY

## 2022-10-13 PROCEDURE — 41100 BIOPSY OF TONGUE: CPT | Performed by: OTOLARYNGOLOGY

## 2022-10-13 PROCEDURE — 6370000000 HC RX 637 (ALT 250 FOR IP): Performed by: OTOLARYNGOLOGY

## 2022-10-13 PROCEDURE — 7100000010 HC PHASE II RECOVERY - FIRST 15 MIN: Performed by: OTOLARYNGOLOGY

## 2022-10-13 PROCEDURE — 3600000005 HC SURGERY LEVEL 5 BASE: Performed by: OTOLARYNGOLOGY

## 2022-10-13 PROCEDURE — 3600000015 HC SURGERY LEVEL 5 ADDTL 15MIN: Performed by: OTOLARYNGOLOGY

## 2022-10-13 PROCEDURE — 31526 DX LARYNGOSCOPY W/OPER SCOPE: CPT | Performed by: OTOLARYNGOLOGY

## 2022-10-13 PROCEDURE — 3700000000 HC ANESTHESIA ATTENDED CARE: Performed by: OTOLARYNGOLOGY

## 2022-10-13 PROCEDURE — 2580000003 HC RX 258

## 2022-10-13 PROCEDURE — 88331 PATH CONSLTJ SURG 1 BLK 1SPC: CPT

## 2022-10-13 PROCEDURE — 7100000011 HC PHASE II RECOVERY - ADDTL 15 MIN: Performed by: OTOLARYNGOLOGY

## 2022-10-13 PROCEDURE — 88305 TISSUE EXAM BY PATHOLOGIST: CPT

## 2022-10-13 PROCEDURE — 6360000002 HC RX W HCPCS

## 2022-10-13 PROCEDURE — 41105 BIOPSY OF TONGUE: CPT | Performed by: OTOLARYNGOLOGY

## 2022-10-13 PROCEDURE — 7100000000 HC PACU RECOVERY - FIRST 15 MIN: Performed by: OTOLARYNGOLOGY

## 2022-10-13 PROCEDURE — 6360000002 HC RX W HCPCS: Performed by: STUDENT IN AN ORGANIZED HEALTH CARE EDUCATION/TRAINING PROGRAM

## 2022-10-13 PROCEDURE — 3700000001 HC ADD 15 MINUTES (ANESTHESIA): Performed by: OTOLARYNGOLOGY

## 2022-10-13 PROCEDURE — 2709999900 HC NON-CHARGEABLE SUPPLY: Performed by: OTOLARYNGOLOGY

## 2022-10-13 PROCEDURE — 31622 DX BRONCHOSCOPE/WASH: CPT | Performed by: OTOLARYNGOLOGY

## 2022-10-13 RX ORDER — HYDROCODONE BITARTRATE AND ACETAMINOPHEN 5; 325 MG/1; MG/1
1 TABLET ORAL EVERY 6 HOURS PRN
Qty: 28 TABLET | Refills: 0 | Status: SHIPPED | OUTPATIENT
Start: 2022-10-13 | End: 2022-10-20

## 2022-10-13 RX ORDER — ACETAMINOPHEN 325 MG/1
650 TABLET ORAL
Status: DISCONTINUED | OUTPATIENT
Start: 2022-10-13 | End: 2022-10-13 | Stop reason: HOSPADM

## 2022-10-13 RX ORDER — SODIUM CHLORIDE 0.9 % (FLUSH) 0.9 %
5-40 SYRINGE (ML) INJECTION EVERY 12 HOURS SCHEDULED
Status: DISCONTINUED | OUTPATIENT
Start: 2022-10-13 | End: 2022-10-13 | Stop reason: HOSPADM

## 2022-10-13 RX ORDER — TRAMADOL HYDROCHLORIDE 50 MG/1
50 TABLET ORAL
Status: DISCONTINUED | OUTPATIENT
Start: 2022-10-13 | End: 2022-10-13 | Stop reason: HOSPADM

## 2022-10-13 RX ORDER — IPRATROPIUM BROMIDE AND ALBUTEROL SULFATE 2.5; .5 MG/3ML; MG/3ML
1 SOLUTION RESPIRATORY (INHALATION)
Status: DISCONTINUED | OUTPATIENT
Start: 2022-10-13 | End: 2022-10-13 | Stop reason: HOSPADM

## 2022-10-13 RX ORDER — DROPERIDOL 2.5 MG/ML
0.62 INJECTION, SOLUTION INTRAMUSCULAR; INTRAVENOUS
Status: DISCONTINUED | OUTPATIENT
Start: 2022-10-13 | End: 2022-10-13 | Stop reason: HOSPADM

## 2022-10-13 RX ORDER — LABETALOL HYDROCHLORIDE 5 MG/ML
5 INJECTION, SOLUTION INTRAVENOUS
Status: DISCONTINUED | OUTPATIENT
Start: 2022-10-13 | End: 2022-10-13 | Stop reason: HOSPADM

## 2022-10-13 RX ORDER — LIDOCAINE HYDROCHLORIDE 20 MG/ML
INJECTION, SOLUTION INTRAVENOUS PRN
Status: DISCONTINUED | OUTPATIENT
Start: 2022-10-13 | End: 2022-10-13 | Stop reason: SDUPTHER

## 2022-10-13 RX ORDER — ONDANSETRON 2 MG/ML
INJECTION INTRAMUSCULAR; INTRAVENOUS PRN
Status: DISCONTINUED | OUTPATIENT
Start: 2022-10-13 | End: 2022-10-13 | Stop reason: SDUPTHER

## 2022-10-13 RX ORDER — FENTANYL CITRATE 50 UG/ML
INJECTION, SOLUTION INTRAMUSCULAR; INTRAVENOUS PRN
Status: DISCONTINUED | OUTPATIENT
Start: 2022-10-13 | End: 2022-10-13 | Stop reason: SDUPTHER

## 2022-10-13 RX ORDER — NALOXONE HYDROCHLORIDE 0.4 MG/ML
INJECTION, SOLUTION INTRAMUSCULAR; INTRAVENOUS; SUBCUTANEOUS PRN
Status: DISCONTINUED | OUTPATIENT
Start: 2022-10-13 | End: 2022-10-13 | Stop reason: SDUPTHER

## 2022-10-13 RX ORDER — ONDANSETRON 2 MG/ML
4 INJECTION INTRAMUSCULAR; INTRAVENOUS
Status: DISCONTINUED | OUTPATIENT
Start: 2022-10-13 | End: 2022-10-13 | Stop reason: HOSPADM

## 2022-10-13 RX ORDER — SODIUM CHLORIDE 9 MG/ML
INJECTION, SOLUTION INTRAVENOUS PRN
Status: DISCONTINUED | OUTPATIENT
Start: 2022-10-13 | End: 2022-10-13 | Stop reason: HOSPADM

## 2022-10-13 RX ORDER — DEXAMETHASONE SODIUM PHOSPHATE 10 MG/ML
INJECTION INTRAMUSCULAR; INTRAVENOUS PRN
Status: DISCONTINUED | OUTPATIENT
Start: 2022-10-13 | End: 2022-10-13 | Stop reason: SDUPTHER

## 2022-10-13 RX ORDER — SODIUM CHLORIDE 0.9 % (FLUSH) 0.9 %
5-40 SYRINGE (ML) INJECTION PRN
Status: DISCONTINUED | OUTPATIENT
Start: 2022-10-13 | End: 2022-10-13 | Stop reason: HOSPADM

## 2022-10-13 RX ORDER — NEOSTIGMINE METHYLSULFATE 1 MG/ML
INJECTION, SOLUTION INTRAVENOUS PRN
Status: DISCONTINUED | OUTPATIENT
Start: 2022-10-13 | End: 2022-10-13 | Stop reason: SDUPTHER

## 2022-10-13 RX ORDER — MIDAZOLAM HYDROCHLORIDE 1 MG/ML
INJECTION INTRAMUSCULAR; INTRAVENOUS PRN
Status: DISCONTINUED | OUTPATIENT
Start: 2022-10-13 | End: 2022-10-13 | Stop reason: SDUPTHER

## 2022-10-13 RX ORDER — KETAMINE HCL IN NACL, ISO-OSM 100MG/10ML
SYRINGE (ML) INJECTION PRN
Status: DISCONTINUED | OUTPATIENT
Start: 2022-10-13 | End: 2022-10-13 | Stop reason: SDUPTHER

## 2022-10-13 RX ORDER — ESMOLOL HYDROCHLORIDE 10 MG/ML
INJECTION INTRAVENOUS PRN
Status: DISCONTINUED | OUTPATIENT
Start: 2022-10-13 | End: 2022-10-13 | Stop reason: SDUPTHER

## 2022-10-13 RX ORDER — HYDRALAZINE HYDROCHLORIDE 20 MG/ML
5 INJECTION INTRAMUSCULAR; INTRAVENOUS
Status: DISCONTINUED | OUTPATIENT
Start: 2022-10-13 | End: 2022-10-13 | Stop reason: HOSPADM

## 2022-10-13 RX ORDER — SODIUM CHLORIDE 9 MG/ML
INJECTION, SOLUTION INTRAVENOUS CONTINUOUS PRN
Status: DISCONTINUED | OUTPATIENT
Start: 2022-10-13 | End: 2022-10-13 | Stop reason: SDUPTHER

## 2022-10-13 RX ORDER — PROPOFOL 10 MG/ML
INJECTION, EMULSION INTRAVENOUS PRN
Status: DISCONTINUED | OUTPATIENT
Start: 2022-10-13 | End: 2022-10-13 | Stop reason: SDUPTHER

## 2022-10-13 RX ORDER — SODIUM CHLORIDE 9 MG/ML
25 INJECTION, SOLUTION INTRAVENOUS PRN
Status: DISCONTINUED | OUTPATIENT
Start: 2022-10-13 | End: 2022-10-13 | Stop reason: HOSPADM

## 2022-10-13 RX ORDER — EPINEPHRINE NASAL SOLUTION 1 MG/ML
SOLUTION NASAL PRN
Status: DISCONTINUED | OUTPATIENT
Start: 2022-10-13 | End: 2022-10-13 | Stop reason: ALTCHOICE

## 2022-10-13 RX ORDER — GLYCOPYRROLATE 0.2 MG/ML
INJECTION INTRAMUSCULAR; INTRAVENOUS PRN
Status: DISCONTINUED | OUTPATIENT
Start: 2022-10-13 | End: 2022-10-13 | Stop reason: SDUPTHER

## 2022-10-13 RX ORDER — DIPHENHYDRAMINE HYDROCHLORIDE 50 MG/ML
12.5 INJECTION INTRAMUSCULAR; INTRAVENOUS
Status: DISCONTINUED | OUTPATIENT
Start: 2022-10-13 | End: 2022-10-13 | Stop reason: HOSPADM

## 2022-10-13 RX ORDER — ROCURONIUM BROMIDE 10 MG/ML
INJECTION, SOLUTION INTRAVENOUS PRN
Status: DISCONTINUED | OUTPATIENT
Start: 2022-10-13 | End: 2022-10-13 | Stop reason: SDUPTHER

## 2022-10-13 RX ORDER — MIDAZOLAM HYDROCHLORIDE 2 MG/2ML
2 INJECTION, SOLUTION INTRAMUSCULAR; INTRAVENOUS
Status: DISCONTINUED | OUTPATIENT
Start: 2022-10-13 | End: 2022-10-13 | Stop reason: HOSPADM

## 2022-10-13 RX ORDER — ONDANSETRON 4 MG/1
4 TABLET, ORALLY DISINTEGRATING ORAL EVERY 8 HOURS PRN
Qty: 10 TABLET | Refills: 0 | Status: SHIPPED | OUTPATIENT
Start: 2022-10-13

## 2022-10-13 RX ADMIN — FENTANYL CITRATE 100 MCG: 50 INJECTION, SOLUTION INTRAMUSCULAR; INTRAVENOUS at 11:35

## 2022-10-13 RX ADMIN — ESMOLOL HYDROCHLORIDE 50 MG: 10 INJECTION, SOLUTION INTRAVENOUS at 12:00

## 2022-10-13 RX ADMIN — SODIUM CHLORIDE 3000 MG: 9 INJECTION, SOLUTION INTRAVENOUS at 11:30

## 2022-10-13 RX ADMIN — MIDAZOLAM 2 MG: 1 INJECTION INTRAMUSCULAR; INTRAVENOUS at 11:32

## 2022-10-13 RX ADMIN — ONDANSETRON 4 MG: 2 INJECTION INTRAMUSCULAR; INTRAVENOUS at 12:30

## 2022-10-13 RX ADMIN — PROPOFOL 100 MG: 10 INJECTION, EMULSION INTRAVENOUS at 11:38

## 2022-10-13 RX ADMIN — Medication 3 MG: at 12:32

## 2022-10-13 RX ADMIN — DEXAMETHASONE SODIUM PHOSPHATE 10 MG: 10 INJECTION INTRAMUSCULAR; INTRAVENOUS at 11:38

## 2022-10-13 RX ADMIN — GLYCOPYRROLATE 0.2 MG: 0.2 INJECTION INTRAMUSCULAR; INTRAVENOUS at 11:32

## 2022-10-13 RX ADMIN — SODIUM CHLORIDE: 9 INJECTION, SOLUTION INTRAVENOUS at 11:15

## 2022-10-13 RX ADMIN — ROCURONIUM BROMIDE 50 MG: 10 INJECTION, SOLUTION INTRAVENOUS at 11:35

## 2022-10-13 RX ADMIN — PROPOFOL 200 MG: 10 INJECTION, EMULSION INTRAVENOUS at 11:35

## 2022-10-13 RX ADMIN — GLYCOPYRROLATE 0.6 MG: 0.2 INJECTION INTRAMUSCULAR; INTRAVENOUS at 12:32

## 2022-10-13 RX ADMIN — Medication 2 MG: at 12:41

## 2022-10-13 RX ADMIN — SODIUM CHLORIDE: 9 INJECTION, SOLUTION INTRAVENOUS at 09:47

## 2022-10-13 RX ADMIN — GLYCOPYRROLATE 0.2 MG: 0.2 INJECTION INTRAMUSCULAR; INTRAVENOUS at 11:22

## 2022-10-13 RX ADMIN — Medication 50 MG: at 11:43

## 2022-10-13 RX ADMIN — MIDAZOLAM 2 MG: 1 INJECTION INTRAMUSCULAR; INTRAVENOUS at 11:22

## 2022-10-13 RX ADMIN — GLYCOPYRROLATE 0.4 MG: 0.2 INJECTION INTRAMUSCULAR; INTRAVENOUS at 12:41

## 2022-10-13 RX ADMIN — NALXONE HYDROCHLORIDE 0.08 MG: 0.4 INJECTION INTRAMUSCULAR; INTRAVENOUS; SUBCUTANEOUS at 12:41

## 2022-10-13 RX ADMIN — LIDOCAINE HYDROCHLORIDE 100 MG: 20 INJECTION, SOLUTION INTRAVENOUS at 11:35

## 2022-10-13 ASSESSMENT — PAIN SCALES - GENERAL
PAINLEVEL_OUTOF10: 0

## 2022-10-13 ASSESSMENT — PAIN - FUNCTIONAL ASSESSMENT: PAIN_FUNCTIONAL_ASSESSMENT: NONE - DENIES PAIN

## 2022-10-13 NOTE — OP NOTE
Operative Note      Patient: Paola Britton  YOB: 1955  MRN: [id number]     Date of Procedure: 10/13/2022    Pre-Op Diagnosis: Tongue mass [K14.8]    Post-Op Diagnosis: Same       Procedure(s):  DIRECT LARYNGOSCOPY, BRONCHOSCOPY, ESOPHAGOSCOPY, biopsy post tongue, biopsy fom, ant tongue biopsy    Surgeon(s):  Eulogio De Leon MD    Assistant:   First Assistant: Reba Coello  Resident: Divya Deng DO    Anesthesia: General    Estimated Blood Loss (mL): less than 50     Complications: None    Specimens:   ID Type Source Tests Collected by Time Destination   A : LEFT POSTERIOR LATERAL TONGUE Tissue Tissue SURGICAL PATHOLOGY Eulogio De Leon MD 10/13/2022 1159        Implants:  * No implants in log *      Drains: * No LDAs found *    Findings: left posterior lateral ant and post base of tongue mass that does not involve vallecula but does go to fom on the left , no mucosal element as mass is submucosal. Frozen- pos scca of the tongue. Direct Laryngoscopy & Esophagoscopy & bronchoscopyProcedure Note   INDICATION: Paola Britton who presents with left lesion noted on clinical examination. Therefore, pan endoscopy with biopsy was indicated. The patient was consented. Procedure in Detail: The patient was brought to the operating room and positioned supine on the operating room table. After induction of anesthesia, the patient's head and neck were prepped and draped in the usual sterile fashion. Mass of left tongue was palpated and bordered ant and post tongue, 15 blade used to excise thru mucosa as lesion was submucosal and a wedge taken of the mass- sent for frozen- pos scca . The larynx was exposed with a dedo laryngoscope and visualized with a patel ami telescope. . Findings are as noted: no laryngeal lesions; no epiglotic lesions; no vallecula. Mapping Biopsies were taken with the cup forceps of the left post bot and fom at # 21 with forceps and ant tongue and sent for permanent path. Hemostasis was achieved with epinephrine-impregnated cottonoid pledgets. Rigid esophagoscopy was performed with no visible esophageal mucosal lesion. There were no palpable cervical lymphadenopathies or floor of mouth lesions or base of tongue lesion other than the one noted. Rigid bronchoscopy performed with a 30 degree patel ami used to visualize the subglottic area  and then down the trachea into the right and left mainstem which all were clear of dissease. The patient tolerated the procedure very well. The patient was awakened, extubated, and taken to the recovery room in good condition. There was minimal blood loss. The patient received preoperative antibiotics. There were no perioperative complications.        Electronically signed by Lisa Lou MD on 10/13/2022 at 12:14 PM

## 2022-10-13 NOTE — DISCHARGE INSTRUCTIONS
Follow up with Dr Mariee Heart in 1 week    Soft diet for the first few days, the advance as tolerated

## 2022-10-13 NOTE — ANESTHESIA PRE PROCEDURE
Department of Anesthesiology  Preprocedure Note       Name:  Melly Garcia   Age:  69 y.o.  :  1955                                          MRN:  [id number]         Date:  10/13/2022      Surgeon: Navneet Holliday):  Ba Mayberry MD    Procedure: Procedure(s):  DIRECT LARYNGOSCOPY, BRONCHOSCOPY, ESOPHAGOSCOPY, NEEDS PATHOLOGY    Medications prior to admission:   Prior to Admission medications    Medication Sig Start Date End Date Taking? Authorizing Provider   HYDROcodone-acetaminophen (NORCO) 5-325 MG per tablet Take 1 tablet by mouth every 6 hours as needed for Pain for up to 7 days. Intended supply: 7 days. Take lowest dose possible to manage pain 10/13/22 10/20/22 Yes Samantha Rom, DO   ondansetron (ZOFRAN ODT) 4 MG disintegrating tablet Take 1 tablet by mouth every 8 hours as needed for Nausea or Vomiting 10/13/22  Yes Samantha Rom, DO   aspirin 81 MG EC tablet Take 81 mg by mouth daily   Yes Historical Provider, MD   loratadine (CLARITIN) 10 MG tablet Take 10 mg by mouth daily PRN    Historical Provider, MD   Multiple Vitamins-Minerals (CENTRUM SILVER PO) Take by mouth    Historical Provider, MD   Ascorbic Acid (VITAMIN C) 1000 MG tablet Take 1,000 mg by mouth daily    Historical Provider, MD   zinc 50 MG TABS tablet Take 50 mg by mouth daily    Historical Provider, MD   Cholecalciferol (VITAMIN D3) 50 MCG (2000 UT) CAPS Take by mouth    Historical Provider, MD   vitamin B-6 (PYRIDOXINE) 100 MG tablet Take 100 mg by mouth daily    Historical Provider, MD   Cyanocobalamin (VITAMIN B-12) 5000 MCG SUBL Place under the tongue    Historical Provider, MD   Melatonin 10 MG TABS Take by mouth    Historical Provider, MD   atorvastatin (LIPITOR) 10 MG tablet Take 10 mg by mouth daily. Historical Provider, MD   omeprazole (PRILOSEC) 20 MG capsule Take 20 mg by mouth daily.       Historical Provider, MD   metoprolol (LOPRESSOR) 50 MG tablet Take 100 mg by mouth daily    Historical Provider, MD Current medications:    Current Facility-Administered Medications   Medication Dose Route Frequency Provider Last Rate Last Admin    sodium chloride flush 0.9 % injection 5-40 mL  5-40 mL IntraVENous 2 times per day Angelika Singh, DO        sodium chloride flush 0.9 % injection 5-40 mL  5-40 mL IntraVENous PRN Angelika Singh, DO        0.9 % sodium chloride infusion   IntraVENous PRN Angelika Singh, DO 20 mL/hr at 10/13/22 0947 New Bag at 10/13/22 0947    ampicillin-sulbactam (UNASYN) 3,000 mg in sodium chloride 0.9 % 100 mL IVPB ([id number])  3,000 mg IntraVENous On Call to [id number] Letty Spencer, DO           Allergies:  No Known Allergies    Problem List:    Patient Active Problem List   Diagnosis Code    Tongue mass K14.8    Post-op pain G89.18       Past Medical History:        Diagnosis Date    Acid reflux disease     Hyperlipidemia     Hypertension        Past Surgical History:        Procedure Laterality Date    COLONOSCOPY  09/27/2022    CYST REMOVAL      upper mid chest    HERNIA REPAIR  2021    TONGUE BIOPSY Left 10/04/2022       Social History:    Social History     Tobacco Use    Smoking status: Never    Smokeless tobacco: Never   Substance Use Topics    Alcohol use:  No                                Counseling given: Not Answered      Vital Signs (Current):   Vitals:    10/07/22 1238 10/13/22 0932   BP:  (!) 172/94   Pulse:  75   Resp:  18   Temp:  36.6 °C (97.9 °F)   TempSrc:  Temporal   SpO2:  97%   Weight: 227 lb (103 kg) 227 lb (103 kg)   Height: 5' 10\" (1.778 m) 5' 10\" (1.778 m)                                              BP Readings from Last 3 Encounters:   10/13/22 (!) 172/94   10/11/22 (!) 142/80       NPO Status: Time of last liquid consumption: 2200                        Time of last solid consumption: 1830                        Date of last liquid consumption: 10/12/22                        Date of last solid food consumption: 10/12/22    BMI:   Wt Readings from Last 3 Encounters:   10/13/22 227 lb (103 kg)   10/11/22 225 lb 9 oz (102.3 kg)   10/04/22 229 lb (103.9 kg)     Body mass index is 32.57 kg/m². CBC: No results found for: WBC, RBC, HGB, HCT, MCV, RDW, PLT    CMP:   Lab Results   Component Value Date/Time    BUN 16 10/05/2022 02:14 PM    CREATININE 1.1 10/05/2022 02:14 PM    GFRAA >60 10/05/2022 02:14 PM    LABGLOM >60 10/05/2022 02:14 PM       POC Tests: No results for input(s): POCGLU, POCNA, POCK, POCCL, POCBUN, POCHEMO, POCHCT in the last 72 hours. Coags: No results found for: PROTIME, INR, APTT    HCG (If Applicable): No results found for: PREGTESTUR, PREGSERUM, HCG, HCGQUANT     ABGs: No results found for: PHART, PO2ART, UMG6QWI, RFN9SND, BEART, R2FXMRNG     Type & Screen (If Applicable):  No results found for: LABABO, LABRH    Drug/Infectious Status (If Applicable):  No results found for: HIV, HEPCAB    COVID-19 Screening (If Applicable): No results found for: COVID19        Anesthesia Evaluation  Patient summary reviewed and Nursing notes reviewed no history of anesthetic complications:   Airway: Mallampati: III  TM distance: >3 FB   Neck ROM: full  Mouth opening: > = 3 FB   Dental: normal exam         Pulmonary:Negative Pulmonary ROS and normal exam  breath sounds clear to auscultation                             Cardiovascular:  Exercise tolerance: good (>4 METS),   (+) hypertension: no interval change,         Rhythm: regular  Rate: normal           Beta Blocker:  Dose within 24 Hrs         Neuro/Psych:   Negative Neuro/Psych ROS              GI/Hepatic/Renal:   (+) GERD: well controlled,           Endo/Other: Negative Endo/Other ROS                    Abdominal:             Vascular: negative vascular ROS. Other Findings:           Anesthesia Plan      general     ASA 2       Induction: intravenous. MIPS: Postoperative opioids intended, Prophylactic antiemetics administered and Postoperative trial extubation.   Anesthetic plan and risks discussed with patient and spouse. Use of blood products discussed with patient and spouse whom consented to blood products. Plan discussed with attending.                     GARRISON Ramos - BAY   10/13/2022

## 2022-10-13 NOTE — H&P
Koki Chairez was seen and re-examined preoperatively today, October 13, 2022. There was no substantial change in his physical and medical status. Patient is fit for the proposed surgical procedure. All questions were appropriately addressed and had no further questions regarding the risks, benefits, and alternatives of the procedure. Koki Chairez and family wished to proceed.     Patricia Nichols DO  Resident Physician  [facility] AT [place]  Otolaryngology Residency  10/13/2022  10:45 AM

## 2022-10-13 NOTE — ANESTHESIA POSTPROCEDURE EVALUATION
Department of Anesthesiology  Postprocedure Note    Patient: Shannon Teague  MRN: [id number]  YOB: 1955  Date of evaluation: 10/13/2022      Procedure Summary     Date: 10/13/22 Room / Location: [facility] OR 04 / [facility]    Anesthesia Start: 2259 Anesthesia Stop:     Procedure: DIRECT LARYNGOSCOPY, BRONCHOSCOPY, ESOPHAGOSCOPY (Mouth) Diagnosis:       Tongue mass      (Tongue mass [K14.8])    Surgeons: Ruby Zepeda MD Responsible Provider: Manoj Avila MD    Anesthesia Type: general ASA Status: 2          Anesthesia Type: No value filed.     Abby Phase I: Abby Score: 10    Abby Phase II:        Anesthesia Post Evaluation    Patient location during evaluation: PACU  Patient participation: complete - patient participated  Level of consciousness: awake and alert  Pain score: 0  Airway patency: patent  Nausea & Vomiting: no nausea and no vomiting  Complications: no  Cardiovascular status: hemodynamically stable and blood pressure returned to baseline  Respiratory status: acceptable, face mask, spontaneous ventilation and nonlabored ventilation  Hydration status: euvolemic  Multimodal analgesia pain management approach

## 2022-10-14 ENCOUNTER — TELEPHONE (OUTPATIENT)
Dept: CASE MANAGEMENT | Age: 67
End: 2022-10-14

## 2022-10-14 ENCOUNTER — TELEPHONE (OUTPATIENT)
Dept: ENT CLINIC | Age: 67
End: 2022-10-14

## 2022-10-14 ENCOUNTER — OFFICE VISIT (OUTPATIENT)
Dept: ONCOLOGY | Age: 67
End: 2022-10-14
Payer: MEDICARE

## 2022-10-14 ENCOUNTER — HOSPITAL ENCOUNTER (OUTPATIENT)
Dept: INFUSION THERAPY | Age: 67
Discharge: HOME | End: 2022-10-14

## 2022-10-14 ENCOUNTER — PREP FOR PROCEDURE (OUTPATIENT)
Dept: ENT CLINIC | Age: 67
End: 2022-10-14

## 2022-10-14 VITALS
BODY MASS INDEX: 32.74 KG/M2 | HEART RATE: 66 BPM | SYSTOLIC BLOOD PRESSURE: 142 MMHG | WEIGHT: 228.7 LBS | HEIGHT: 70 IN | TEMPERATURE: 97.9 F | DIASTOLIC BLOOD PRESSURE: 82 MMHG | OXYGEN SATURATION: 97 %

## 2022-10-14 DIAGNOSIS — K14.8 TONGUE MASS: Primary | ICD-10-CM

## 2022-10-14 PROBLEM — C06.9 CANCER OF ORAL CAVITY (HCC): Status: ACTIVE | Noted: 2022-10-14

## 2022-10-14 PROCEDURE — 1036F TOBACCO NON-USER: CPT | Performed by: STUDENT IN AN ORGANIZED HEALTH CARE EDUCATION/TRAINING PROGRAM

## 2022-10-14 PROCEDURE — G8427 DOCREV CUR MEDS BY ELIG CLIN: HCPCS | Performed by: STUDENT IN AN ORGANIZED HEALTH CARE EDUCATION/TRAINING PROGRAM

## 2022-10-14 PROCEDURE — 99205 OFFICE O/P NEW HI 60 MIN: CPT | Performed by: STUDENT IN AN ORGANIZED HEALTH CARE EDUCATION/TRAINING PROGRAM

## 2022-10-14 PROCEDURE — 3017F COLORECTAL CA SCREEN DOC REV: CPT | Performed by: STUDENT IN AN ORGANIZED HEALTH CARE EDUCATION/TRAINING PROGRAM

## 2022-10-14 PROCEDURE — 99214 OFFICE O/P EST MOD 30 MIN: CPT

## 2022-10-14 PROCEDURE — 1123F ACP DISCUSS/DSCN MKR DOCD: CPT | Performed by: STUDENT IN AN ORGANIZED HEALTH CARE EDUCATION/TRAINING PROGRAM

## 2022-10-14 PROCEDURE — G8417 CALC BMI ABV UP PARAM F/U: HCPCS | Performed by: STUDENT IN AN ORGANIZED HEALTH CARE EDUCATION/TRAINING PROGRAM

## 2022-10-14 PROCEDURE — G8484 FLU IMMUNIZE NO ADMIN: HCPCS | Performed by: STUDENT IN AN ORGANIZED HEALTH CARE EDUCATION/TRAINING PROGRAM

## 2022-10-14 RX ORDER — METOPROLOL TARTRATE 100 MG/1
100 TABLET ORAL DAILY
COMMUNITY

## 2022-10-14 RX ORDER — ACETAMINOPHEN 500 MG
1000 TABLET ORAL DAILY PRN
COMMUNITY

## 2022-10-14 RX ORDER — TETRAHYDROZOLINE HCL 0.05 %
4 DROPS OPHTHALMIC (EYE) 2 TIMES DAILY
COMMUNITY

## 2022-10-14 ASSESSMENT — ENCOUNTER SYMPTOMS
COUGH: 0
SHORTNESS OF BREATH: 0
TROUBLE SWALLOWING: 0
GASTROINTESTINAL NEGATIVE: 1

## 2022-10-14 NOTE — TELEPHONE ENCOUNTER
Met with patient and his wife during the initial consult with Dr. Alana Tinajero for left tongue lesion. Introduced nurse navigator role, gave contact information and informed of other supportive services in clinic:  and nutrition. Patient denied issues with living arrangements, transportation, insurance and prescription coverage. Patient has his own teeth and reported current appetite as good, denying unplanned weight loss. Informed the clinic dietitian will be consulted for additional support, patient was agreeable. Patient has already had his radiation consult, stated he sees Dr. Monty Dutta on 10/20/2022. He will follow up with Dr. Alana Tinajero on 10/21/2022 to discuss plan of care per medical oncology. He and his wife denied needs today, encouraged them to call should any arise, they verbalized understanding. Gris Medina RN, ADN, BSE, OCN Patient Nurse Navigator

## 2022-10-14 NOTE — PROGRESS NOTES
Faith Frankford  1955 69 y.o. Referring Physician:     PCP: Jose Eduardo Camara MD    Vitals:    10/14/22 0956   BP: (!) 142/82   Pulse: 66   Temp: 97.9 °F (36.6 °C)   SpO2: 97%        Wt Readings from Last 3 Encounters:   10/14/22 228 lb 11.2 oz (103.7 kg)   10/13/22 227 lb (103 kg)   10/11/22 225 lb 9 oz (102.3 kg)        Body mass index is 32.82 kg/m². Chief Complaint:   Chief Complaint   Patient presents with    New Patient         Cancer Staging  No matching staging information was found for the patient. Prior Radiation Therapy? NO    Concurrent Chemo/radiation? NO    Prior Chemotherapy? NO    Prior Hormonal Therapy? NO    Head and Neck Cancer? No, patient does NOT have HN cancer. LMP: na    Age at first Menses: na    : na    Para: na          Current Outpatient Medications:     Cyanocobalamin (VITAMIN B 12 PO), Take 1 tablet by mouth daily, Disp: , Rfl:     metoprolol (LOPRESSOR) 100 MG tablet, Take 100 mg by mouth daily, Disp: , Rfl:     Polyvinyl Alcohol-Povidone (REFRESH OP), Apply 4 drops to eye in the morning and at bedtime Indications: bilateral eyes, Disp: , Rfl:     tetrahydrozoline 0.05 % ophthalmic solution, Place 4 drops into both eyes 2 times daily, Disp: , Rfl:     acetaminophen (TYLENOL) 500 MG tablet, Take 1,000 mg by mouth as needed for Pain, Disp: , Rfl:     HYDROcodone-acetaminophen (NORCO) 5-325 MG per tablet, Take 1 tablet by mouth every 6 hours as needed for Pain for up to 7 days. Intended supply: 7 days.  Take lowest dose possible to manage pain, Disp: 28 tablet, Rfl: 0    aspirin 81 MG EC tablet, Take 81 mg by mouth daily, Disp: , Rfl:     loratadine (CLARITIN) 10 MG tablet, Take 10 mg by mouth daily PRN, Disp: , Rfl:     Multiple Vitamins-Minerals (CENTRUM SILVER PO), Take 1 tablet by mouth daily, Disp: , Rfl:     Ascorbic Acid (VITAMIN C) 1000 MG tablet, Take 1,000 mg by mouth daily, Disp: , Rfl:     zinc 50 MG TABS tablet, Take 100 mg by mouth daily, Disp: , Rfl:     Cholecalciferol (VITAMIN D3) 50 MCG (2000 UT) CAPS, Take 1 capsule by mouth daily, Disp: , Rfl:     vitamin B-6 (PYRIDOXINE) 100 MG tablet, Take 100 mg by mouth daily, Disp: , Rfl:     atorvastatin (LIPITOR) 10 MG tablet, Take 10 mg by mouth daily. , Disp: , Rfl:     omeprazole (PRILOSEC) 20 MG capsule, Take 20 mg by mouth daily. , Disp: , Rfl:     ondansetron (ZOFRAN ODT) 4 MG disintegrating tablet, Take 1 tablet by mouth every 8 hours as needed for Nausea or Vomiting (Patient not taking: Reported on 10/14/2022), Disp: 10 tablet, Rfl: 0       Past Medical History:   Diagnosis Date    Acid reflux disease     Hyperlipidemia     Hypertension        Past Surgical History:   Procedure Laterality Date    COLONOSCOPY  09/27/2022    CYST REMOVAL      upper mid chest    HERNIA REPAIR  2021    LARYNGOSCOPY N/A 10/13/2022    DIRECT LARYNGOSCOPY, BRONCHOSCOPY, ESOPHAGOSCOPY performed by Jeremy Mann MD at [address] 10/04/2022       Family History   Problem Relation Age of Onset    High Blood Pressure Mother     Breast Cancer Mother     Heart Disease Mother     Heart Disease Father     Other Father         heart valve replacement    High Blood Pressure Sister     No Known Problems Maternal Aunt     No Known Problems Maternal Uncle     No Known Problems Paternal Aunt     No Known Problems Paternal Uncle     Stroke Maternal Grandmother     Cancer Maternal Grandfather         patient doesn't know site. Cancer Paternal Grandmother         patient doesn't know site.     Stroke Paternal Grandfather     No Known Problems Maternal Cousin     No Known Problems Paternal Cousin     No Known Problems Daughter     No Known Problems Daughter     No Known Problems Son     No Known Problems Grandchild        Social History     Socioeconomic History    Marital status:      Spouse name: Not on file    Number of children: Not on file    Years of education: Not on file    Highest education level: Not on file   Occupational History    Not on file   Tobacco Use    Smoking status: Never     Passive exposure: Past    Smokeless tobacco: Never   Vaping Use    Vaping Use: Never used   Substance and Sexual Activity    Alcohol use: No    Drug use: No    Sexual activity: Not Currently   Other Topics Concern    Not on file   Social History Narrative    Not on file     Social Determinants of Health     Financial Resource Strain: Not on file   Food Insecurity: Not on file   Transportation Needs: Not on file   Physical Activity: Not on file   Stress: Not on file   Social Connections: Not on file   Intimate Partner Violence: Not on file   Housing Stability: Not on file           Occupation: copperwire/cable management  Retired:  YES: Patient is retired from [employer]. REVIEW OF SYSTEMS:     Pacemaker/Defibulator/ICD:  No    Mediport: No           FALLS RISK SCREENING ASSESSMENT    Instructions:  Assess the patient and [place] the appropriate indicators that are present for fall risk identification. Total the numbers circled and assign a fall risk score from Table 2.  Reassess patient at a minimum every 12 weeks or with status change. Assessment   Date  10/14/2022     1. Mental Ability: confusion/cognitively impaired No - 0       2. Elimination Issues: incontinence, frequency No - 0       3. Ambulatory: use of assistive devices (walker, cane, off-loading devices), attached to equipment (IV pole, oxygen) No - 0     4. Sensory Limitations: dizziness, vertigo, impaired vision No - 0       5. Age 72 years or greater - 1       10. Medication: diuretics, strong analgesics, hypnotics, sedatives, antihypertensive agents   Yes - 3   7. Falls:  recent history of falls within the last 3 months (not to include slipping or tripping)   No - 0   TOTAL 4    If score of 4 or greater was education given? Yes       TABLE 2   Risk Score Risk Level Plan of Care   0-3 Little or  No Risk 1.   Provide assistance as indicated for ambulation activities  2. Reorient confused/cognitively impaired patient  3. Call-light/bell within patient's reach  4. Chair/bed in low position, stretcher/bed with siderails up except when performing patient care activities  5. Educate patient/family/caregiver on falls prevention  6.  Reassess in 12 weeks or with any noted change in patient condition which places them at a risk for a fall   4-6 Moderate Risk 1. Provide assistance as indicated for ambulation activities  2. Reorient confused/cognitively impaired patient  3. Call-light/bell within patient's reach  4. Chair/bed in low position, stretcher/bed with siderails up except when performing patient care activities  5. Educate patient/family/caregiver on falls prevention  6. Falls risk precaution (Yellow sticker Level II) placed on patient chart   7 or   Higher High Risk 1. Place patient in easily observable treatment room  2. Patient attended at all times by family member or staff  3. Provide assistance as indicated for ambulation activities  4. Reorient confused/cognitively impaired patient  5. Call-light/bell within patient's reach  6. Chair/bed in low position, stretcher/bed with siderails up except when performing patient care activities  7. Educate patient/family/caregiver on falls prevention  8. Falls risk precaution (Yellow sticker Level III) placed on patient chart           MALNUTRITION RISK SCREENING ASSESSMENT    Instructions:  Assess the patient and enter the appropriate indicators that are present for nutrition risk identification. Total the numbers entered and assign a risk score. Follow the appropriate action for total score listed below. Assessment   Date  10/14/2022     Have you lost weight without trying? 0- No     Have you been eating poorly because of a decreased appetite? 0- No   3. Do you have a diagnosis of head and neck cancer?       0- No TOTAL 0        Score of 0-1: No action  Score 2 or greater: For Non-Diabetic Patient: Recommend adding Ensure Enlive 2 x daily and provide patient with Ensure wellness bag with coupons  For Diabetic Patient: Recommend adding Glucerna Shake 2 x daily and provide patient with Glucerna Wellness bag with coupons  Route to the dietitian via [address]    Are you having  difficulty performing daily routine tasks  due to fatigue or weakness (ie: bathing/showering, dressing, housework, meal prep, work, child [place] Glassing): No     Do you have any arm flexibility/ROM restrictions, swelling or pain that limit activity: No     Any changes in memory, attention/focus that impact daily activities: No     Do you avoid participation in leisure/social activity due weakness, fatigue or pain: No     ARE ANY OF THE ABOVE ARE ANSWERED YES: No          PT ASSESSMENT FOR REFERRAL    Have you had any recent falls in past 2 months: No     Do you have difficulty  going up/down stairs: No     Are you having difficulty walking: No     Do you often hold onto furniture/environmental supports or feel off balance when you are walking: No     Do you need to take rest breaks when you are walking: No     Any pain on scale of 1-10 that limits your mobility: No 0/10    ARE ANY OF THE ABOVE ARE ANSWERED YES: No       - Do you have any pelvic pain? No     - Do you have any fecal constipation or fecal incontinence? No     - Do you have any urinary incontinence or difficulty starting to urinate? No     ARE ANY OF THE ABOVE ARE ANSWERED YES: No           PREHAB AUDIOLOGY REFERRAL    - Is patient planned to receive Cisplatin? No. This patient is not planned to start Cisplatin. - Is patient complaining of new onset hearing loss? No. Patient is not complaining of new onset hearing loss.         Thea Lara RN

## 2022-10-14 NOTE — PROGRESS NOTES
[address] [facility] ONCOLOGY  [facility] [address]  Dept: Raman: [phone]  Attending Consult Note      Reason for Visit:   Left tongue mass    Referring Provider: Maru Phillips DO    PCP:  Kaur Osorio MD    Chief Complaint:   Chief Complaint   Patient presents with    New Patient       History of Present Illness: The patient is a 69 y.o. male who comes in for consultation. Left tongue mass. At this time there is no confirmed diagnosis. 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 Was not until the last 3 months, when he felt the left aspect of his tongue started to feel harder/rougher. He sought evaluation with his dentist, then dermatologist and oral surgeon. Subsequently he was referred to ENT, seen by Dr. Lakisha Javier as malignancy became a concern. And then on 10/4/2022, FNA was done which was unfortunately nondiagnostic. CT soft tissue neck on 10/10/2022 noted a 3.8 x 1.8 x 2.6 cm tumor involving the lateral aspect of his tongue, and subsequent PET scan showed hypermetabolic activity in this area. He established with radiation oncology with Dr. Michael Mendez for recommendations recently. And yesterday, he followed up with Dr. Lakisha Javier for pan endoscopy and repeat biopsy. Panendoscopy did not reveal any other lesions apart from that of the left tongue. We are currently awaiting for pathology at this time. Today, he establishes with me and was accompanied by his wife. He denies ever having significant pain. He denies of lymphadenopathy, unintended weight loss, difficulty swallowing. He denies history of smoking, drinking, or illicit drug use.   Overall he appears comfortable and attentive. Review of Systems; Review of Systems   Constitutional:  Negative for fatigue, fever and unexpected weight change. HENT:  Positive for mouth sores (Status post left arm cauterization from procedure). Negative for trouble swallowing. Respiratory:  Negative for cough and shortness of breath. Cardiovascular:  Negative for leg swelling. Gastrointestinal: Negative. Musculoskeletal: Negative. Skin: Negative. Neurological: Negative. Hematological:  Negative for adenopathy. Does not bruise/bleed easily. Psychiatric/Behavioral: Negative. Past Medical History:      Diagnosis Date    Acid reflux disease     Hyperlipidemia     Hypertension      Patient Active Problem List   Diagnosis    Tongue mass    Post-op pain        Past Surgical History:      Procedure Laterality Date    COLONOSCOPY  09/27/2022    CYST REMOVAL      upper mid chest    HERNIA REPAIR  2021    LARYNGOSCOPY N/A 10/13/2022    DIRECT LARYNGOSCOPY, BRONCHOSCOPY, ESOPHAGOSCOPY performed by Mateusz Ha MD at [facility] Left 10/04/2022       Family History:  Family History   Problem Relation Age of Onset    Breast Cancer Mother     Heart Disease Mother     Other Father         heart valve replacement       Medications:  Reviewed and reconciled. Current Outpatient Medications   Medication Sig Dispense Refill    HYDROcodone-acetaminophen (NORCO) 5-325 MG per tablet Take 1 tablet by mouth every 6 hours as needed for Pain for up to 7 days. Intended supply: 7 days.  Take lowest dose possible to manage pain 28 tablet 0    ondansetron (ZOFRAN ODT) 4 MG disintegrating tablet Take 1 tablet by mouth every 8 hours as needed for Nausea or Vomiting 10 tablet 0    aspirin 81 MG EC tablet Take 81 mg by mouth daily      loratadine (CLARITIN) 10 MG tablet Take 10 mg by mouth daily PRN      Multiple Vitamins-Minerals (CENTRUM SILVER PO) Take by mouth      Ascorbic Acid (VITAMIN C) 1000 MG tablet Take 1,000 mg by mouth daily      zinc 50 MG TABS tablet Take 50 mg by mouth daily      Cholecalciferol (VITAMIN D3) 50 MCG (2000 UT) CAPS Take by mouth      vitamin B-6 (PYRIDOXINE) 100 MG tablet Take 100 mg by mouth daily      Cyanocobalamin (VITAMIN B-12) 5000 MCG SUBL Place under the tongue      Melatonin 10 MG TABS Take by mouth      atorvastatin (LIPITOR) 10 MG tablet Take 10 mg by mouth daily. omeprazole (PRILOSEC) 20 MG capsule Take 20 mg by mouth daily. metoprolol (LOPRESSOR) 50 MG tablet Take 100 mg by mouth daily       No current facility-administered medications for this visit. Social History:  Social History     Socioeconomic History    Marital status:      Spouse name: Not on file    Number of children: Not on file    Years of education: Not on file    Highest education level: Not on file   Occupational History    Not on file   Tobacco Use    Smoking status: Never    Smokeless tobacco: Never   Vaping Use    Vaping Use: Never used   Substance and Sexual Activity    Alcohol use: No    Drug use: No    Sexual activity: Not Currently   Other Topics Concern    Not on file   Social History Narrative    Not on file     Social Determinants of Health     Financial Resource Strain: Not on file   Food Insecurity: Not on file   Transportation Needs: Not on file   Physical Activity: Not on file   Stress: Not on file   Social Connections: Not on file   Intimate Partner Violence: Not on file   Housing Stability: Not on file       Allergies:  No Known Allergies    Physical Exam:  BP (!) 142/82   Pulse 66   Temp 97.9 °F (36.6 °C)   Ht 5' 10\" (1.778 m)   Wt 228 lb 11.2 oz (103.7 kg)   SpO2 97%   BMI 32.82 kg/m²   Physical Exam  Constitutional:       General: He is not in acute distress. Appearance: He is not ill-appearing or toxic-appearing. HENT:      Head: Normocephalic.       Nose: Nose normal.      Mouth/Throat:      Mouth: Mucous membranes are moist.      Comments: Firm left tongue mass palpated. Multiple spots of cauterization from recent procedure. Cardiovascular:      Rate and Rhythm: Normal rate and regular rhythm. Pulmonary:      Effort: No respiratory distress. Breath sounds: No wheezing. Abdominal:      General: Abdomen is flat. There is no distension. Palpations: Abdomen is soft. There is no mass. Tenderness: There is no abdominal tenderness. Musculoskeletal:      Cervical back: Normal range of motion. No rigidity or tenderness. Right lower leg: No edema. Left lower leg: No edema. Lymphadenopathy:      Cervical: No cervical adenopathy. Skin:     Findings: No lesion or rash. Neurological:      General: No focal deficit present. Mental Status: He is alert and oriented to person, place, and time. Psychiatric:         Mood and Affect: Mood normal.         Behavior: Behavior normal.         Thought Content: Thought content normal.       ECOG PS 0    Pathology:              CT SOFT TISSUE NECK W CONTRAST    Result Date: 10/11/2022  EXAMINATION: CT OF THE NECK SOFT TISSUE WITH CONTRAST  10/10/2022 TECHNIQUE: CT of the neck was performed with the administration of intravenous contrast. Multiplanar reformatted images are provided for review. Automated exposure control, iterative reconstruction, and/or weight based adjustment of the mA/kV was utilized to reduce the radiation dose to as low as reasonably achievable. COMPARISON: None. HISTORY: ORDERING SYSTEM PROVIDED HISTORY: Tongue mass TECHNOLOGIST PROVIDED HISTORY: STAT Creatinine as needed:->No Reason for exam:->left lateral tongue mass FINDINGS: PHARYNX/LARYNX:  The palatine tonsils are normal in appearance. There is an asymmetrically enhancing focus in the left aspect of the tongue that measures approximately 3.8 x 1.8 x 2.6 cm. The valleculae, epiglottis, aryepiglottic folds and pyriform sinuses appear unremarkable. The true and false vocal cords are normal in appearance.   No mass or abscess is seen. SALIVARY GLANDS/THYROID:  The parotid and submandibular glands appear unremarkable. The thyroid gland appears unremarkable. LYMPH NODES:  No cervical or supraclavicular lymphadenopathy is seen. SOFT TISSUES:  No appreciable soft tissue swelling or mass is seen. BRAIN/ORBITS/SINUSES:  The visualized portion of the intracranial contents appear unremarkable. The visualized portion of the orbits, paranasal sinuses and mastoid air cells demonstrate no acute abnormality. LUNG APICES/SUPERIOR MEDIASTINUM:  No focal consolidation is seen within the visualized lung apices. No superior mediastinal lymphadenopathy or mass. The visualized portion of the trachea appears unremarkable. BONES:  No aggressive appearing lytic or blastic bony lesion. Asymmetrically enhancing mass in the left aspect of the tongue concerning for neoplastic process. Tissue sampling is recommended. PET CT SKULL BASE TO MID THIGH    Result Date: 10/11/2022  EXAMINATION: WHOLE BODY PET/CT 10/10/2022 TECHNIQUE: Following IV injection of 13.1 mCi of F-18 FDG, PET  tumor imaging was acquired from the skull vertex to the mid thighs. Computed tomography was used for purposes of attenuation correction and anatomic localization. Fusion imaging was utilized for interpretation. Uptake time 60 min. Glucose level 92 mg/dl. COMPARISON: CT scan of the neck 10/10/2022 HISTORY: ORDERING SYSTEM PROVIDED HISTORY: Tongue cancer [facility]) TECHNOLOGIST PROVIDED HISTORY: Reason for exam:->left lateral tongue mass What reading provider will be dictating this exam?->CRC FINDINGS: HEAD/NECK: Asymmetric activity along the left lateral aspect of the tongue where there is an enhancing mass described on the CT concurrent contrast-enhanced CT scan, SUV max of 9.9. No metabolically active cervical lymphadenopathy. CHEST: No metabolically active pulmonary nodules. No metabolically active axillary, hilar, or mediastinal lymphadenopathy.  ABDOMEN/PELVIS: No metabolically active intraperitoneal mass. No metabolically active abdominal or pelvic lymphadenopathy. Physiologic activity in the gastrointestinal and genitourinary systems. BONES/SOFT TISSUE: No abnormal FDG activity localizes to the bones. No aggressive osseous lesion. INCIDENTAL CT FINDINGS: Atherosclerotic calcifications within the coronary arteries and the aorta and its branches. Cholelithiasis. Excreted contrast material is present. 1.  Metabolic activity associated with the mass along the left lateral aspect of the tongue consistent with history of cancer. 2.  No metabolically active metastatic disease. ASSESSMENT:    Left tongue mass, concerning for malignancy: The patient established with me on 10/14/2022. Upon first visit, no diagnosis has been obtained, as he had a negative FNA on 10/4/2022. Furthermore he has imaging findings concerning for malignancy on 10/10/2022 with CT soft tissue neck and PET scan. Of note he did have previous lesion of squamous hyperplasia of the left tongue back in 2020 which was resected, as noted below. Further details outlined on history above  Clinically, appears to be at least a T3 case if he indeed has squamous cell carcinoma. Previous history of left tongue squamous hyperplasia: Noted on biopsy in 2020. There is also associated hyperkeratosis/parakeratosis with chronic inflammation. See above of snippet of report. PLAN:  Met with patient and wife was also present, we outlined possibilities of his pathology, as previous FNA is nondiagnostic. He has already met with ENT again for repeat biopsy and panendoscopy. He has also met with radiation oncology. Otherwise he appears well, and healthy. I did outline suspected malignancy, notably squamous cell carcinoma. We discussed the general treatment approach for his case. Have also reviewed the PET scan and he is already aware of the results.     In his setting, consideration for upfront surgery could be suitable. And adjuvant therapies could be consideration depending on surgical results. Apart from awaiting for diagnosis and potential treatment plans in the near future, I discussed the general surveillance protocol, which could entail history/physical and repeat scans. And he is also understanding of following up closely with ENT in the years ahead. Patient is scheduled to be discussed on multidisciplinary tumor board on 10/18/2022. Patient will be following up with Dr. Lamont Turpin (ENT) on 10/20/2022. RTC 10/21/2020 2 in the morning discuss further management    Approximately spent 61 minutes on chart review as well as time spent on patient encounter, discussing the laboratory, imaging, and clinical findings. I have discussed clinical implications and recommendations on the patient's primary issues. More than 50% of time was spent counseling patient. The patient verbalized understanding.       Thank you for allowing us to participate in the care of Rolando Ambrose, [address]   10/14/22 10:03 AM    [facility]) Office  P: [phone]  F: [phone] [facility]) Office  P: [phone]  F: [phone]

## 2022-10-14 NOTE — TELEPHONE ENCOUNTER
Prior Authorization Form:      DEMOGRAPHICS:                     Patient Name:  Shai Gee  Patient :  1955            Insurance:  Payor: MEDICARE / Plan: MEDICARE PART A AND B / Product Type: *No Product type* /   Insurance ID Number:    Payer/Plan Subscr  Sex Relation Sub. Ins. ID Effective Group Num   1. [facility] J 1955 Male Self [id number] 10/1/22                                    [address] [address]   2.  [address] J 1955 Male Self [id number] 10/1/22                                    [address]         DIAGNOSIS & PROCEDURE:                       Procedure/Operation: Partial glossectomy, neck dissection, fore arm flap           CPT Code: 20564, [id number], [id number]    Diagnosis:  Tongue mass, oral cavity cancer    ICD10 Code: K14.8, C06.9    Location:  [facility]    Surgeon:  Dr. Loi Owens INFORMATION:                          Date: 22    Time: n/a              Anesthesia:  General                                                       Status:  Outpatient        Special Comments:  ROBOT       Electronically signed by Patrick Bravo MA on 10/14/2022 at 12:37 PM

## 2022-10-19 NOTE — PROGRESS NOTES
Dear Dr Susu Borden MD No ref. provider found     We had the pleasure of seeing Renald Gosselin, 1955 here    on 10/20/2022  Please see below for review of care and plans. Chief complaint- No diagnosis found. History of Present Illness- 70 y/o male presents to our clinic for evaluation of tongue lesion. Left lateral tongue. Noticed approximately 2-3 months ago. Non painful, non tender. Tolerating PO intake. Denies cervical lymphadenopathy. Denies weight loss. Denies appetite change. Denies difficulty swallowing or drinking. Has had previous biopsy of tongue lesion which came back benign. Non smoker. Recent biopsy- pos scca    10/20/22: Pt here for 1 wk p/o Panendo. States pain was improving fri and sat after Sx, then sun night started w/ a burning pain in throat and cheek. Has a white patch on cheek that is causing concern of infection. Also states he has increased foul odor from mouth. Some difficulty swallowing when pain flares up. No change in voice since recovering from Panendo. Drinks 3-4 glasses of water, green tea periodically, and no alcohol. Weight is stable. Review of Systems- No drainage, discharge, or headache. Complete 10 system ROS completed and negative except as noted above. Physical Examination-   Vital Signs-Ht 5' 10\" (1.778 m)   Wt 228 lb (103.4 kg)   BMI 32.71 kg/m²     Ears- Tympanic membranes clear bilaterally. No middle ear effusion. No pre or post auricular tenderness. Nose- Nasal mucosa clear and dry. No significant septal deviation or inferior turbinate hypertrophy. Oral Cavity/Oropharynx- Floor of mouth and tongue are soft and nontender. No posterior pharyngeal erythema. + gag reflex left lateral tongue/floor of mouth ~2cm plaque like lesion with submucosal firmness. + healing biopsy sites intraorally. Neck- Soft and nontender. No masses, lesions, lymphadenopathy, or thyroid nodules appreciated.    Cranial Nerve- Cranial nerves II to XII intact. Extraocular muscles intact. No gross motor visual deficits. No spontaneous nystagmus. Face- No facial skin tenderness to palpation. Heart- No cyanosis, regular  Lungs- No stridor, no intercostal accessory muscle use  General- The patient is in no acute distress. A&O x3    Nasopharyngoscopy prevoius  Procedure note- after pt verbally consented, was sprayed nasally with 1:1 neosynephrine and xylocaine. Scope was passed and found nasal cavity with no lesion. nasopharynx clear, tonsil wnl without asymmetry, tongue mobile and no masses, vocal cords mobile bilaterally with full ab and ad duction. Hypopharynx clear, open and no masses. Medical Decision Making and Treatment Plan. Pt seen and examined, scope exam with grossly normal findings. FNA of tongue done in office today. Will schedule for panendoscopy. Follow up in 1 week for path results review. R/B/A of surgery discussed with pt. Pt understood, consent signed, and would like to proceed to surgery. No personal or family history of bleeding or adverse reaction to anesthesia. Suspect will  need upfront surgical therapy with recon and then adjuvant therapy based upon pathology  Present at tumor board- recommend upfront surgery with neck and path driven adjuvant therapy as indicated. Pan consult- ordered  Panendo- results-  pos scca. Posterior base of tongue mapping near 19 was pos  Recommend partial glossectomy with neck dissection and forearm free flap for txment as mass is 4 cm and with margins will have sig tissue loss. Long dw pt, wife and daughter on process of surgery. Recommend preop peg and trach day of- they udnerstood  Talked about rehab and fact that all children live in [place], offered ability to seek second opinion there if they wanted, also offered to have rehab there if he needed it. 13. R/B/A of surgery discussed with pt. Pt understood, consent signed, and would like to proceed to surgery.  No personal or family history of bleeding or adverse reaction to anesthesia. I spent 45 minutes with the patients care and >50% of this time on counseling or coordinating care in this advanced oral cancer patient. Thank you for the opportunity to take part in the care of this very pleasant patient, [place]  Sincerely,          Kasie Pedraza.  Jesse Lee M.D., Ph.D., [address] [place] [address]   Department of Otolaryngology-Head and Neck Surgery  Chief of Head & Neck Surgical Oncology  Director Head & Neck Oncology Service Line

## 2022-10-20 ENCOUNTER — OFFICE VISIT (OUTPATIENT)
Dept: ENT CLINIC | Age: 67
End: 2022-10-20
Payer: MEDICARE

## 2022-10-20 VITALS — WEIGHT: 228 LBS | HEIGHT: 70 IN | BODY MASS INDEX: 32.64 KG/M2

## 2022-10-20 DIAGNOSIS — R13.11 ORAL PHASE DYSPHAGIA: ICD-10-CM

## 2022-10-20 DIAGNOSIS — C06.9 ORAL CANCER (HCC): ICD-10-CM

## 2022-10-20 DIAGNOSIS — C02.9 TONGUE CANCER (HCC): Primary | ICD-10-CM

## 2022-10-20 DIAGNOSIS — K13.79 MOUTH PAIN: ICD-10-CM

## 2022-10-20 DIAGNOSIS — R13.10 ODYNOPHAGIA: ICD-10-CM

## 2022-10-20 PROCEDURE — G8427 DOCREV CUR MEDS BY ELIG CLIN: HCPCS | Performed by: OTOLARYNGOLOGY

## 2022-10-20 PROCEDURE — 1123F ACP DISCUSS/DSCN MKR DOCD: CPT | Performed by: OTOLARYNGOLOGY

## 2022-10-20 PROCEDURE — 99215 OFFICE O/P EST HI 40 MIN: CPT | Performed by: OTOLARYNGOLOGY

## 2022-10-20 PROCEDURE — G8484 FLU IMMUNIZE NO ADMIN: HCPCS | Performed by: OTOLARYNGOLOGY

## 2022-10-20 PROCEDURE — G8417 CALC BMI ABV UP PARAM F/U: HCPCS | Performed by: OTOLARYNGOLOGY

## 2022-10-20 PROCEDURE — 1036F TOBACCO NON-USER: CPT | Performed by: OTOLARYNGOLOGY

## 2022-10-20 PROCEDURE — 3017F COLORECTAL CA SCREEN DOC REV: CPT | Performed by: OTOLARYNGOLOGY

## 2022-10-20 RX ORDER — CHLORHEXIDINE GLUCONATE 0.12 MG/ML
15 RINSE ORAL 2 TIMES DAILY
Qty: 420 ML | Refills: 0 | Status: SHIPPED | OUTPATIENT
Start: 2022-10-20 | End: 2022-11-03

## 2022-10-21 ENCOUNTER — OFFICE VISIT (OUTPATIENT)
Dept: ONCOLOGY | Age: 67
End: 2022-10-21
Payer: MEDICARE

## 2022-10-21 VITALS
DIASTOLIC BLOOD PRESSURE: 89 MMHG | BODY MASS INDEX: 32.44 KG/M2 | OXYGEN SATURATION: 100 % | HEART RATE: 79 BPM | TEMPERATURE: 97.2 F | WEIGHT: 226.6 LBS | HEIGHT: 70 IN | SYSTOLIC BLOOD PRESSURE: 148 MMHG

## 2022-10-21 DIAGNOSIS — K14.8 TONGUE MASS: Primary | ICD-10-CM

## 2022-10-21 DIAGNOSIS — C06.9 CANCER OF ORAL CAVITY (HCC): ICD-10-CM

## 2022-10-21 PROCEDURE — 3017F COLORECTAL CA SCREEN DOC REV: CPT | Performed by: STUDENT IN AN ORGANIZED HEALTH CARE EDUCATION/TRAINING PROGRAM

## 2022-10-21 PROCEDURE — 99214 OFFICE O/P EST MOD 30 MIN: CPT | Performed by: STUDENT IN AN ORGANIZED HEALTH CARE EDUCATION/TRAINING PROGRAM

## 2022-10-21 PROCEDURE — G8427 DOCREV CUR MEDS BY ELIG CLIN: HCPCS | Performed by: STUDENT IN AN ORGANIZED HEALTH CARE EDUCATION/TRAINING PROGRAM

## 2022-10-21 PROCEDURE — 1036F TOBACCO NON-USER: CPT | Performed by: STUDENT IN AN ORGANIZED HEALTH CARE EDUCATION/TRAINING PROGRAM

## 2022-10-21 PROCEDURE — G8417 CALC BMI ABV UP PARAM F/U: HCPCS | Performed by: STUDENT IN AN ORGANIZED HEALTH CARE EDUCATION/TRAINING PROGRAM

## 2022-10-21 PROCEDURE — G8484 FLU IMMUNIZE NO ADMIN: HCPCS | Performed by: STUDENT IN AN ORGANIZED HEALTH CARE EDUCATION/TRAINING PROGRAM

## 2022-10-21 PROCEDURE — 1123F ACP DISCUSS/DSCN MKR DOCD: CPT | Performed by: STUDENT IN AN ORGANIZED HEALTH CARE EDUCATION/TRAINING PROGRAM

## 2022-10-21 PROCEDURE — 99212 OFFICE O/P EST SF 10 MIN: CPT

## 2022-10-21 RX ORDER — HYDROCODONE BITARTRATE AND ACETAMINOPHEN 10; 325 MG/1; MG/1
1 TABLET ORAL EVERY 6 HOURS PRN
COMMUNITY

## 2022-10-21 ASSESSMENT — ENCOUNTER SYMPTOMS
GASTROINTESTINAL NEGATIVE: 1
COUGH: 0
SHORTNESS OF BREATH: 0
TROUBLE SWALLOWING: 0

## 2022-10-21 NOTE — PROGRESS NOTES
[address] [address]  Dept: [facility]: [phone]  Clinic Progress Note      Reason for Visit:   Left tongue mass    Referring Provider: Aj Blandon DO    PCP:  Esteban Hernandes MD    Chief Complaint:   Chief Complaint   Patient presents with    Cancer    Follow-up     Tongue mass       Subjective:  Patient returns to clinic after following up with ENT yesterday. We had also discussed his case during multidisciplinary tumor board. His surgery is scheduled on 11/14/22. Patient denies of any new symptoms since I last saw him last week. HPI from Initial Outpatient Consultation - 10/14/2022:  The patient is a 69 y.o. male who comes in for consultation. Left tongue mass. At this time there is no confirmed diagnosis. 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 Was not until the last 3 months, when he felt the left aspect of his tongue started to feel harder/rougher. He sought evaluation with his dentist, then dermatologist and oral surgeon. Subsequently he was referred to ENT, seen by Dr. Isabela Dunn as malignancy became a concern. And then on 10/4/2022, FNA was done which was unfortunately nondiagnostic. CT soft tissue neck on 10/10/2022 noted a 3.8 x 1.8 x 2.6 cm tumor involving the lateral aspect of his tongue, and subsequent PET scan showed hypermetabolic activity in this area. He established with radiation oncology with Dr. Denver Peck for recommendations recently. And yesterday, he followed up with Dr. Isabela Dunn for veronica endoscopy and repeat biopsy. Panendoscopy did not reveal any other lesions apart from that of the left tongue.   We Post-Operative Cataract Instructions  [facility] INVASIVE SURGERY [facility]  Wayne Bazan M.D. Jailyn Reilly. Bharit Esquivel M.D.  [address] [place], [facility]  [phone]      Diet  1. Resume normal diet. 2. Do not drink alcoholic beverages, including beer for 24 hours. Activity  1. Do not drive a car or operate any hazardous machinery the day of surgery. 2. You may resume normal activities as tolerated. 3. No bending or heavy lifting. 4. No reading or computer work after your surgery. You may watch TV. Wound Care  1. Anticipate that your eye will tear and water. 2. You may also experience a sensation of a foreign body, sand, or grit in the surgical eye, this is normal.  3. Do not touch the affected eye. 4. ** Do not remove eye shield unless directed to do so by your physician. **  5. Wear eye shield when resting or sleeping for one week. Medications  1. Take Tylenol Extra Strength two (2) tablets by mouth upon arrival home and then every four (4) hours as needed for discomfort. 2. Regarding Eye drops:  -  Begin using your eye drops as directed by your physician in the (right) operative eye. One drop of     []   Zymar    [x]  Vigamox   along with one (1) drop     []  Acular    [x]  Voltaren   every four (4) hours while awake. Wait five (5) minutes then apply one (1) drop     []  Pred Forte    [x]  Econopred Plus   every four (4) hours while awake. 3. If you take glaucoma medications, continue to do so unless the physician otherwise instructs you. 4. You may use artificial tears as needed if your eye feels scratchy. Notify your Physician Immediately for any of the followin. Excessive pain not relieved by Tylenol especially headache or increasing pressure to the operative eye. 2. Persistent nausea lasting more than eight hours. 3. If any vomiting occurs. If any questions or concerns arise, call your Surgeon at [phone].       Follow drop schedule are currently awaiting for pathology at this time. Today, he establishes with me and was accompanied by his wife. He denies ever having significant pain. He denies of lymphadenopathy, unintended weight loss, difficulty swallowing. He denies history of smoking, drinking, or illicit drug use. Overall he appears comfortable and attentive. Review of Systems; Review of Systems   Constitutional:  Negative for fatigue, fever and unexpected weight change. HENT:  Positive for mouth sores (Status post left arm cauterization from procedure). Negative for trouble swallowing. Respiratory:  Negative for cough and shortness of breath. Cardiovascular:  Negative for leg swelling. Gastrointestinal: Negative. Musculoskeletal: Negative. Skin: Negative. Neurological: Negative. Hematological:  Negative for adenopathy. Does not bruise/bleed easily. Psychiatric/Behavioral: Negative.          Past Medical History:      Diagnosis Date    Acid reflux disease     Cancer of oral cavity ([facility] [address] 10/14/2022    Hyperlipidemia     Hypertension      Patient Active Problem List   Diagnosis    Tongue mass    Post-op pain    Cancer of oral cavity [facility])        Past Surgical History:      Procedure Laterality Date    COLONOSCOPY  09/27/2022    CYST REMOVAL      upper mid chest    HERNIA REPAIR  2021    LARYNGOSCOPY N/A 10/13/2022    DIRECT LARYNGOSCOPY, BRONCHOSCOPY, ESOPHAGOSCOPY performed by Tori Mendez MD at [facility] Left 10/04/2022       Family History:  Family History   Problem Relation Age of Onset    High Blood Pressure Mother     Breast Cancer Mother     Heart Disease Mother     Heart Disease Father     Other Father         heart valve replacement    High Blood Pressure Sister     No Known Problems Maternal Aunt     No Known Problems Maternal Uncle     No Known Problems Paternal Aunt     No Known Problems Paternal Uncle     Stroke Maternal Grandmother     Cancer Maternal Grandfather from Dr. Jaqui Dean office      Jamila Primer from Nurse    PATIENT INSTRUCTIONS:    After general anesthesia or intravenous sedation, for 24 hours or while taking prescription Narcotics:  · Limit your activities  · Do not drive and operate hazardous machinery  · Do not make important personal or business decisions  · Do  not drink alcoholic beverages  · If you have not urinated within 8 hours after discharge, please contact your surgeon on call. Report the following to your surgeon:  · Excessive pain, swelling, redness or odor of or around the surgical area  · Temperature over 100.5  · Nausea and vomiting lasting longer than 4 hours or if unable to take medications  · Any signs of decreased circulation or nerve impairment to extremity: change in color, persistent  numbness, tingling, coldness or increase pain  · Any questions    What to do at Home:  Recommended activity: Ambulate in house,     If you experience any of the following symptoms above, please follow up with Dr. Lucie Arita. *  Please give a list of your current medications to your Primary Care Provider. *  Please update this list whenever your medications are discontinued, doses are      changed, or new medications (including over-the-counter products) are added. *  Please carry medication information at all times in case of emergency situations. These are general instructions for a healthy lifestyle:    No smoking/ No tobacco products/ Avoid exposure to second hand smoke  Surgeon General's Warning:  Quitting smoking now greatly reduces serious risk to your health.     Obesity, smoking, and sedentary lifestyle greatly increases your risk for illness    A healthy diet, regular physical exercise & weight monitoring are important for maintaining a healthy lifestyle    You may be retaining fluid if you have a history of heart failure or if you experience any of the following symptoms:  Weight gain of 3 pounds or more overnight or 5 patient doesn't know site. Cancer Paternal Grandmother         patient doesn't know site. Stroke Paternal Grandfather     No Known Problems Maternal Cousin     No Known Problems Paternal Cousin     No Known Problems Daughter     No Known Problems Daughter     No Known Problems Son     No Known Problems Grandchild        Medications:  Reviewed and reconciled. Current Outpatient Medications   Medication Sig Dispense Refill    HYDROcodone-acetaminophen (NORCO)  MG per tablet Take 1 tablet by mouth every 6 hours as needed for Pain. chlorhexidine (PERIDEX) 0.12 % solution Take 15 mLs by mouth 2 times daily for 14 days 420 mL 0    Magic Mouthwash (MIRACLE MOUTHWASH) Swish and spit 5 mLs 4 times daily as needed for Irritation 200 mL 5    Cyanocobalamin (VITAMIN B 12 PO) Take 1 tablet by mouth daily      metoprolol (LOPRESSOR) 100 MG tablet Take 100 mg by mouth daily      Polyvinyl Alcohol-Povidone (REFRESH OP) Apply 4 drops to eye in the morning and at bedtime Indications: bilateral eyes      tetrahydrozoline 0.05 % ophthalmic solution Place 4 drops into both eyes 2 times daily      acetaminophen (TYLENOL) 500 MG tablet Take 1,000 mg by mouth as needed for Pain      ondansetron (ZOFRAN ODT) 4 MG disintegrating tablet Take 1 tablet by mouth every 8 hours as needed for Nausea or Vomiting 10 tablet 0    aspirin 81 MG EC tablet Take 81 mg by mouth daily      loratadine (CLARITIN) 10 MG tablet Take 10 mg by mouth daily PRN      Multiple Vitamins-Minerals (CENTRUM SILVER PO) Take 1 tablet by mouth daily      Ascorbic Acid (VITAMIN C) 1000 MG tablet Take 1,000 mg by mouth daily      zinc 50 MG TABS tablet Take 100 mg by mouth daily      Cholecalciferol (VITAMIN D3) 50 MCG (2000 UT) CAPS Take 1 capsule by mouth daily      vitamin B-6 (PYRIDOXINE) 100 MG tablet Take 100 mg by mouth daily      atorvastatin (LIPITOR) 10 MG tablet Take 10 mg by mouth daily.         omeprazole (PRILOSEC) 20 MG capsule Take 20 mg by pounds in a week, increased swelling in our hands or feet or shortness of breath while lying flat in bed. Please call your doctor as soon as you notice any of these symptoms; do not wait until your next office visit. Patient armband removed and shredded    The discharge information has been reviewed with the patient and caregiver. The patient and caregiver verbalized understanding. Discharge medications reviewed with the patient and caregiver and appropriate educational materials and side effects teaching were provided. Learning About COVID-19 and Social Distancing  What is it? Social distancing means putting space between yourself and other people. The recommended distance is 6 feet, or about 2 meters. This also means staying away from any place where people may gather, such as shanks or other public gathering places. Why is it important? Social distancing is the best way to reduce the spread of COVID-19. This virus seems to spread from person to person through droplets from coughing and sneezing. So if you keep your distance from others, you're less likely to get it or spread it. And social distancing is important for everyone, not just those who are at high risk of infection, like older people. You might have the virus but not have symptoms. You could then give the infection to someone you come into contact with. How is it done? Putting 6 feet, or about 2 meters, between you and other people is the recommended distance. Also stay away from any place where people may gather, such as shanks or other public gathering places. So if possible:  · Work from home, and keep your kids at home. · Don't travel if you don't have to. And avoid public transportation, ride-shares, and taxis unless you have no choice. · Limit shopping to essentials, like food and medicines. · Wear a cloth face cover if you have to go to a public place like the grocery store or pharmacy. · Don't eat in restaurants.  (You can mouth daily. No current facility-administered medications for this visit. Social History:  Social History     Socioeconomic History    Marital status:      Spouse name: Not on file    Number of children: Not on file    Years of education: Not on file    Highest education level: Not on file   Occupational History    Not on file   Tobacco Use    Smoking status: Never     Passive exposure: Past    Smokeless tobacco: Never   Vaping Use    Vaping Use: Never used   Substance and Sexual Activity    Alcohol use: No    Drug use: No    Sexual activity: Not Currently   Other Topics Concern    Not on file   Social History Narrative    Not on file     Social Determinants of Health     Financial Resource Strain: Not on file   Food Insecurity: Not on file   Transportation Needs: Not on file   Physical Activity: Not on file   Stress: Not on file   Social Connections: Not on file   Intimate Partner Violence: Not on file   Housing Stability: Not on file       Allergies:  No Known Allergies    Physical Exam:  BP (!) 148/89   Pulse 79   Temp 97.2 °F (36.2 °C)   Ht 5' 10\" (1.778 m)   Wt 226 lb 9.6 oz (102.8 kg)   SpO2 100%   BMI 32.51 kg/m²   Physical Exam  Constitutional:       General: He is not in acute distress. Appearance: He is not ill-appearing, toxic-appearing or diaphoretic. HENT:      Head: Normocephalic. Nose: Nose normal.      Mouth/Throat:      Mouth: Mucous membranes are moist.      Pharynx: No oropharyngeal exudate or posterior oropharyngeal erythema. Comments: Firm left tongue mass palpated. Multiple spots of cauterization from recent procedure. Eyes:      General: No scleral icterus. Cardiovascular:      Rate and Rhythm: Normal rate and regular rhythm. Heart sounds: No murmur heard. Pulmonary:      Effort: No respiratory distress. Breath sounds: No stridor. No wheezing. Abdominal:      General: Abdomen is flat. There is no distension.       Palpations: Abdomen still get takeout or food deliveries.)  · Avoid crowds and busy places. Follow stay-at-home orders or other directions for your area. Where can you learn more? Go to http://www.gray.com/  Enter A133 in the search box to learn more about \"Learning About COVID-19 and Social Distancing. \"  Current as of: December 18, 2020               Content Version: 12.8  © 2006-2021 [employer]wise[employer]. Care instructions adapted under license by [employer] (which disclaims liability or warranty for this information). If you have questions about a medical condition or this instruction, always ask your healthcare professional. [address] any warranty or liability for your use of this information.     ___________________________________________________________________________________________________________________________________ is soft. There is no mass. Tenderness: There is no abdominal tenderness. Musculoskeletal:      Cervical back: Normal range of motion. No rigidity or tenderness. Right lower leg: No edema. Left lower leg: No edema. Lymphadenopathy:      Cervical: No cervical adenopathy. Skin:     Findings: No lesion or rash. Neurological:      General: No focal deficit present. Mental Status: He is alert and oriented to person, place, and time. Psychiatric:         Mood and Affect: Mood normal.         Behavior: Behavior normal.         Thought Content: Thought content normal.       ECOG PS 0    Pathology:              CT SOFT TISSUE NECK W CONTRAST    Result Date: 10/11/2022  EXAMINATION: CT OF THE NECK SOFT TISSUE WITH CONTRAST  10/10/2022 TECHNIQUE: CT of the neck was performed with the administration of intravenous contrast. Multiplanar reformatted images are provided for review. Automated exposure control, iterative reconstruction, and/or weight based adjustment of the mA/kV was utilized to reduce the radiation dose to as low as reasonably achievable. COMPARISON: None. HISTORY: ORDERING SYSTEM PROVIDED HISTORY: Tongue mass TECHNOLOGIST PROVIDED HISTORY: STAT Creatinine as needed:->No Reason for exam:->left lateral tongue mass FINDINGS: PHARYNX/LARYNX:  The palatine tonsils are normal in appearance. There is an asymmetrically enhancing focus in the left aspect of the tongue that measures approximately 3.8 x 1.8 x 2.6 cm. The valleculae, epiglottis, aryepiglottic folds and pyriform sinuses appear unremarkable. The true and false vocal cords are normal in appearance. No mass or abscess is seen. SALIVARY GLANDS/THYROID:  The parotid and submandibular glands appear unremarkable. The thyroid gland appears unremarkable. LYMPH NODES:  No cervical or supraclavicular lymphadenopathy is seen. SOFT TISSUES:  No appreciable soft tissue swelling or mass is seen.  BRAIN/ORBITS/SINUSES:  The visualized portion of the intracranial contents appear unremarkable. The visualized portion of the orbits, paranasal sinuses and mastoid air cells demonstrate no acute abnormality. LUNG APICES/SUPERIOR MEDIASTINUM:  No focal consolidation is seen within the visualized lung apices. No superior mediastinal lymphadenopathy or mass. The visualized portion of the trachea appears unremarkable. BONES:  No aggressive appearing lytic or blastic bony lesion. Asymmetrically enhancing mass in the left aspect of the tongue concerning for neoplastic process. Tissue sampling is recommended. PET CT SKULL BASE TO MID THIGH    Result Date: 10/11/2022  EXAMINATION: WHOLE BODY PET/CT 10/10/2022 TECHNIQUE: Following IV injection of 13.1 mCi of F-18 FDG, PET  tumor imaging was acquired from the skull vertex to the mid thighs. Computed tomography was used for purposes of attenuation correction and anatomic localization. Fusion imaging was utilized for interpretation. Uptake time 60 min. Glucose level 92 mg/dl. COMPARISON: CT scan of the neck 10/10/2022 HISTORY: ORDERING SYSTEM PROVIDED HISTORY: Tongue cancer [facility]) TECHNOLOGIST PROVIDED HISTORY: Reason for exam:->left lateral tongue mass What reading provider will be dictating this exam?->CRC FINDINGS: HEAD/NECK: Asymmetric activity along the left lateral aspect of the tongue where there is an enhancing mass described on the CT concurrent contrast-enhanced CT scan, SUV max of 9.9. No metabolically active cervical lymphadenopathy. CHEST: No metabolically active pulmonary nodules. No metabolically active axillary, hilar, or mediastinal lymphadenopathy. ABDOMEN/PELVIS: No metabolically active intraperitoneal mass. No metabolically active abdominal or pelvic lymphadenopathy. Physiologic activity in the gastrointestinal and genitourinary systems. BONES/SOFT TISSUE: No abnormal FDG activity localizes to the bones. No aggressive osseous lesion.  INCIDENTAL CT FINDINGS: Atherosclerotic calcifications within the coronary arteries and the aorta and its branches. Cholelithiasis. Excreted contrast material is present. 1.  Metabolic activity associated with the mass along the left lateral aspect of the tongue consistent with history of cancer. 2.  No metabolically active metastatic disease. ASSESSMENT:    Left tongue mass, concerning for malignancy: The patient established with me on 10/14/2022. Upon first visit, no diagnosis has been obtained, as he had a negative FNA on 10/4/2022. Furthermore he has imaging findings concerning for malignancy on 10/10/2022 with CT soft tissue neck and PET scan. Of note he did have previous lesion of squamous hyperplasia of the left tongue back in 2020 which was resected, as noted below. Further details outlined on history above  Clinically, appears to be at least a T3 case if he indeed has squamous cell carcinoma. Discussed case during multidisciplinary tumor board on 10/18/2022. Pathology noted squamous cell carcinoma. Previous history of left tongue squamous hyperplasia: Noted on biopsy in 2020. There is also associated hyperkeratosis/parakeratosis with chronic inflammation. See above of snippet of report. PLAN:  At this time, it appears biopsy from 10/13/2022 still pending. Patient's case was discussed during multidisciplinary tumor board, and pathology has noted squamous cell carcinoma. We have concluded that patient is appropriate for upfront surgery, and depending on surgical pathology results, could be considered for adjuvant RT. I have also disclosed my role in medical oncology, if further adverse features are noted, chemotherapy may play a role, likely in the concurrent RT setting, again depending on surgical pathology results. Again patient and wife inquired about surveillance protocol. We had discussion again regarding this. Patient is understanding.     At this time surgery is scheduled on 11/14/2022, and patient did acknowledge surgery is likely going to be extensive, she could require hospital stay, possible tracheostomy and PEG tube placement, possible need for longer recovery time. RTC on 11/30/22. Will secure an appointment on this date for now, and offer pt and wife to call us back if appointment needs to be postponed. Approximately spent 32 minutes on chart review as well as time spent on patient encounter, discussing the laboratory, imaging, and clinical findings. I have discussed clinical implications and recommendations on the patient's primary issues. More than 50% of time was spent counseling patient. The patient verbalized understanding.       Thank you for allowing us to participate in the care of Tahira Fajardo, [address]   10/21/22 9:13 AM    [facility]) Office  P: [phone]  F: [phone] [facility]) Office  P: [phone]  F: [phone]

## 2022-10-25 ENCOUNTER — TELEPHONE (OUTPATIENT)
Dept: SURGERY | Age: 67
End: 2022-10-25

## 2022-10-25 PROBLEM — E46 MALNUTRITION (HCC): Status: ACTIVE | Noted: 2022-10-25

## 2022-10-25 NOTE — TELEPHONE ENCOUNTER
Per the order of Dr. Shoshana Jacobs, patient has been scheduled for peg tube placement on 22. Patient instructed to please contact our office with any questions. Information regarding procedure sent via mail. Surgery scheduled through [address] [place] [address] Rd. Dr. Shoshana Jacobs to enter orders. Prior Authorization Form:      DEMOGRAPHICS:                     Patient Name:  Igor Jimenez  Patient :  1955            Insurance:  Payor: MEDICARE / Plan: MEDICARE PART A AND B / Product Type: *No Product type* /   Insurance ID Number:    Payer/Plan Subscr  Sex Relation Sub. Ins. ID Effective Group Num   1. [facility] J 1955 Male Self [id number] 10/1/22                                    [address] [address]   2.  Rhianna  J 1955 Male Self [id number] 10/1/22                                    [address]         DIAGNOSIS & PROCEDURE:                       Procedure/Operation: peg tube placement           CPT Code: 61257    Diagnosis:  malnutrition    ICD10 Code: R13.10    Location:  [place] [address]    Surgeon:  Kunal Dahl INFORMATION:                          Date: 22    Time: TBD              Anesthesia:  MAC/TIVA                                                       Status:  Outpatient        Special Comments:  N/A       Electronically signed by Ben Poole LPN on 81/60/5581 at 9:33 AM

## 2022-10-27 ENCOUNTER — PREP FOR PROCEDURE (OUTPATIENT)
Dept: SURGERY | Age: 67
End: 2022-10-27

## 2022-10-27 RX ORDER — SODIUM CHLORIDE 0.9 % (FLUSH) 0.9 %
5-40 SYRINGE (ML) INJECTION PRN
Status: CANCELLED | OUTPATIENT
Start: 2022-10-27

## 2022-10-27 RX ORDER — SODIUM CHLORIDE 9 MG/ML
25 INJECTION, SOLUTION INTRAVENOUS PRN
Status: CANCELLED | OUTPATIENT
Start: 2022-10-27

## 2022-10-27 RX ORDER — SODIUM CHLORIDE 0.9 % (FLUSH) 0.9 %
5-40 SYRINGE (ML) INJECTION EVERY 12 HOURS SCHEDULED
Status: CANCELLED | OUTPATIENT
Start: 2022-10-27

## 2022-10-31 NOTE — PROGRESS NOTES
[address]   PRE-ADMISSION TESTING GENERAL INSTRUCTIONS  PAT Phone Number: [phone]      GENERAL INSTRUCTIONS:    [x] Antibacterial Soap Shower Night before and/or AM of surgery. [x] Do not wear makeup, lotions, powders, deodorant. [x] Nothing by mouth after midnight; including gum, candy, mints, or water. [x] You may brush your teeth, gargle, but do NOT swallow water. [x] No tobacco products, illegal drugs, marijuana or alcohol within 24 hours of your surgery. [x] Jewelry or valuables should not be brought to the hospital. All body and/or tongue piercing's must be removed prior to arriving to hospital. No contact lens or hair pins. [x] Arrange transportation with a responsible adult  to and from the hospital. Arrange for someone to be with you for the remainder of the day and for 24 hours after your procedure due to having had anesthesia. -Who will be your  for transportation? Alex Andrade, spouse    [x] [employer] card and photo ID. [x] Transfusion Bracelet: Please bring with you to hospital, day of surgery. PARKING INSTRUCTIONS:     [x] ARRIVAL DATE  11/14 & TIME   5:30 am:   [x] Enter into the [employer] of [employer]Â®. Two people may accompany you. [x] Parking lot '\"I\"  is located on [facility] (the corner of [facility]). To enter the lot,you will need to get a parking ticket at the gate . To exit you will be given a free parking voucher. You will need to scan the parking ticket, then scan the voucher. One car per patient is allowed to park in this lot. Walk up the front walk to the [facility], the door will be locked an employee will greet you and let you in. EDUCATION INSTRUCTIONS:        [x] Pre-admission Testing educational folder given  [x] Incentive Spirometry,coughing & deep breathing exercises reviewed.      [x] Medication information sheet(s)     [x] Pain: Post-op pain is normal and to be expected. You will be asked to rate your pain from 0-10. MEDICATION INSTRUCTIONS:    [x] Bring a complete list of your medications, please write the last time you took the medicine, give this list to the nurse in Pre-Op. [x] Take only the following medications the morning of surgery with 1-2 ounces of water:   metoprolol,omeprazole    [x] May take the morning of surgery if needed with a sip of water: hydrocodone- acetaminophen    [x] Stop all herbal supplements and vitamins 5 days before surgery. Stop ibuprofen (NSAIDS) 7 days before surgery. [x] Follow physician instructions regarding any blood thinners you may be taking. Last day 11/6    WHAT TO EXPECT:    [x] The day of surgery you will be greeted and checked in by the [employer].  In addition, you will be registered in the [place] by a Patient Access Representative. Please bring your photo ID and insurance card. A nurse will greet you in accordance to the time you are needed in the pre-op area to prepare you for surgery. Please do not be discouraged if you are not greeted in the order you arrive as there are many variables that are involved in patient preparation. Your patience is greatly appreciated as you wait for your nurse. Please bring in items such as: books, magazines, newspapers, electronics, or any other items  to occupy your time in the waiting area. [x]  Delays may occur with surgery and staff will make a sincere effort to keep you informed of delays. If any delays occur with your procedure, we apologize ahead of time for your inconvenience as we recognize the value of your time.

## 2022-11-07 ENCOUNTER — TELEPHONE (OUTPATIENT)
Dept: RADIATION ONCOLOGY | Age: 67
End: 2022-11-07

## 2022-11-07 ENCOUNTER — ANESTHESIA EVENT (OUTPATIENT)
Dept: ENDOSCOPY | Age: 67
End: 2022-11-07
Payer: MEDICARE

## 2022-11-07 ENCOUNTER — HOSPITAL ENCOUNTER (OUTPATIENT)
Dept: PREADMISSION TESTING | Age: 67
Discharge: HOME | End: 2022-11-07
Payer: MEDICARE

## 2022-11-07 VITALS
WEIGHT: 225 LBS | HEART RATE: 75 BPM | OXYGEN SATURATION: 98 % | TEMPERATURE: 98 F | SYSTOLIC BLOOD PRESSURE: 144 MMHG | BODY MASS INDEX: 32.21 KG/M2 | RESPIRATION RATE: 16 BRPM | DIASTOLIC BLOOD PRESSURE: 86 MMHG | HEIGHT: 70 IN

## 2022-11-07 DIAGNOSIS — Z01.812 PRE-OPERATIVE LABORATORY EXAMINATION: ICD-10-CM

## 2022-11-07 LAB
ABO/RH: NORMAL
ANTIBODY SCREEN: NORMAL
HCT VFR BLD CALC: 41.4 % (ref 37–54)
HEMOGLOBIN: 14.2 G/DL (ref 12.5–16.5)
MCH RBC QN AUTO: 29.3 PG (ref 26–35)
MCHC RBC AUTO-ENTMCNC: 34.3 % (ref 32–34.5)
MCV RBC AUTO: 85.4 FL (ref 80–99.9)
PDW BLD-RTO: 12.5 FL (ref 11.5–15)
PLATELET # BLD: 223 E9/L (ref 130–450)
PMV BLD AUTO: 10.1 FL (ref 7–12)
RBC # BLD: 4.85 E12/L (ref 3.8–5.8)
WBC # BLD: 5.2 E9/L (ref 4.5–11.5)

## 2022-11-07 PROCEDURE — 87081 CULTURE SCREEN ONLY: CPT

## 2022-11-07 PROCEDURE — 36415 COLL VENOUS BLD VENIPUNCTURE: CPT

## 2022-11-07 PROCEDURE — 86850 RBC ANTIBODY SCREEN: CPT

## 2022-11-07 PROCEDURE — 86900 BLOOD TYPING SEROLOGIC ABO: CPT

## 2022-11-07 PROCEDURE — 85027 COMPLETE CBC AUTOMATED: CPT

## 2022-11-07 PROCEDURE — 86901 BLOOD TYPING SEROLOGIC RH(D): CPT

## 2022-11-07 NOTE — PROGRESS NOTES
[address] [facility]                                                                                                                    PRE OP INSTRUCTIONS FOR  Mandie Welsh        Date: 11/7/2022    Date of surgery: 11/8/22   Arrival Time: Hospital will call you between 5pm and 7pm with your final arrival time for surgery    Do not eat or drink anything after midnight prior to surgery. This includes no water, chewing gum, mints or ice chips. Take the following medications with a small sip of water on the morning of Surgery: Norco prn,Metoprolol, Omeprazole     Diabetics may take evening dose of insulin but none after midnight. If you feel symptomatic or low blood sugar morning of surgery drink 1-2 ounces of apple juice only. Aspirin, Ibuprofen, Advil, Naproxen, Vitamin E and other Anti-inflammatory products should be stopped  before surgery  as directed by your physician. Take Tylenol only unless instructed otherwise by your surgeon. Check with your Doctor regarding stopping Plavix, Coumadin, Lovenox, Eliquis, Effient, or other blood thinners. Do not smoke,use illicit drugs and do not drink any alcoholic beverages 24 hours prior to surgery. You may brush your teeth the morning of surgery. DO NOT SWALLOW WATER    You MUST make arrangements for a responsible adult to take you home after your surgery. You will not be allowed to leave alone or drive yourself home. It is strongly suggested someone stay with you the first 24 hrs. Your surgery will be cancelled if you do not have a ride home. PEDIATRIC PATIENTS ONLY:  A parent/legal guardian must accompany a child scheduled for surgery and plan to stay at the hospital until the child is discharged. Please do not bring other children with you.     Please wear simple, loose fitting clothing to the hospital.  [place] not bring valuables (money, credit cards, checkbooks, etc.) Do not wear any makeup (including no eye makeup) or nail polish on your fingers or toes. DO NOT wear any jewelry or piercings on day of surgery. All body piercing jewelry must be removed. Shower the night before surgery with _x__Antibacterial soap /REYNOLD WIPES________    TOTAL JOINT REPLACEMENT/HYSTERECTOMY PATIENTS ONLY---Remember to bring Blood Bank bracelet to the hospital on the day of surgery. If you have a Living Will and Durable Power of  for Healthcare, please bring in a copy. If appropriate bring crutches, inspirex, WALKER, CANE etc... Notify your Surgeon if you develop any illness between now and surgery time, cough, cold, fever, sore throat, nausea, vomiting, etc.  Please notify your surgeon if you experience dizziness, shortness of breath or blurred vision between now & the time of your surgery. If you have ___dentures, they will be removed before going to the OR; we will provide you a container. If you wear ___contact lenses or _x__glasses, they will be removed; please bring a case for them. To provide excellent care visitors will be limited to 2 in the room at any given time. Please bring picture ID and insurance card. Sleep apnea patients need to bring CPAP AND SETTINGS to hospital on day of surgery. During flu season no children under the age of 15 are permitted in the hospital for the safety of all patients. Other                   Please call AMBULATORY CARE if you have any further questions.    [address] [facility]     [address]

## 2022-11-07 NOTE — TELEPHONE ENCOUNTER
Attempted to contact pt per referral for H&N CA prior to upcoming neck dissection. Left message w/ contact info. Await return call.   Electronically signed by Eleazar Hensley MS, RD, LD on 11/7/2022 at 9:57 AM

## 2022-11-08 ENCOUNTER — ANESTHESIA (OUTPATIENT)
Dept: ENDOSCOPY | Age: 67
End: 2022-11-08
Payer: MEDICARE

## 2022-11-08 ENCOUNTER — HOSPITAL ENCOUNTER (OUTPATIENT)
Age: 67
Setting detail: OUTPATIENT SURGERY
Discharge: HOME | End: 2022-11-08
Attending: SURGERY | Admitting: SURGERY
Payer: MEDICARE

## 2022-11-08 VITALS
TEMPERATURE: 98.7 F | DIASTOLIC BLOOD PRESSURE: 98 MMHG | BODY MASS INDEX: 31.5 KG/M2 | WEIGHT: 220 LBS | SYSTOLIC BLOOD PRESSURE: 156 MMHG | HEART RATE: 98 BPM | OXYGEN SATURATION: 98 % | HEIGHT: 70 IN | RESPIRATION RATE: 20 BRPM

## 2022-11-08 LAB — MRSA CULTURE ONLY: NORMAL

## 2022-11-08 PROCEDURE — 2709999900 HC NON-CHARGEABLE SUPPLY: Performed by: SURGERY

## 2022-11-08 PROCEDURE — 3609013300 HC EGD TUBE PLACEMENT: Performed by: SURGERY

## 2022-11-08 PROCEDURE — 7100000011 HC PHASE II RECOVERY - ADDTL 15 MIN: Performed by: SURGERY

## 2022-11-08 PROCEDURE — 3700000001 HC ADD 15 MINUTES (ANESTHESIA): Performed by: SURGERY

## 2022-11-08 PROCEDURE — 2580000003 HC RX 258: Performed by: SURGERY

## 2022-11-08 PROCEDURE — 43246 EGD PLACE GASTROSTOMY TUBE: CPT | Performed by: SURGERY

## 2022-11-08 PROCEDURE — 7100000010 HC PHASE II RECOVERY - FIRST 15 MIN: Performed by: SURGERY

## 2022-11-08 PROCEDURE — 2580000003 HC RX 258: Performed by: ANESTHESIOLOGY

## 2022-11-08 PROCEDURE — 3700000000 HC ANESTHESIA ATTENDED CARE: Performed by: SURGERY

## 2022-11-08 PROCEDURE — 99024 POSTOP FOLLOW-UP VISIT: CPT | Performed by: SURGERY

## 2022-11-08 PROCEDURE — 6360000002 HC RX W HCPCS: Performed by: SURGERY

## 2022-11-08 PROCEDURE — 2580000003 HC RX 258: Performed by: NURSE ANESTHETIST, CERTIFIED REGISTERED

## 2022-11-08 PROCEDURE — 6360000002 HC RX W HCPCS: Performed by: NURSE ANESTHETIST, CERTIFIED REGISTERED

## 2022-11-08 RX ORDER — SODIUM CHLORIDE, SODIUM LACTATE, POTASSIUM CHLORIDE, CALCIUM CHLORIDE 600; 310; 30; 20 MG/100ML; MG/100ML; MG/100ML; MG/100ML
INJECTION, SOLUTION INTRAVENOUS CONTINUOUS PRN
Status: DISCONTINUED | OUTPATIENT
Start: 2022-11-08 | End: 2022-11-08 | Stop reason: SDUPTHER

## 2022-11-08 RX ORDER — SODIUM CHLORIDE 0.9 % (FLUSH) 0.9 %
5-40 SYRINGE (ML) INJECTION PRN
Status: DISCONTINUED | OUTPATIENT
Start: 2022-11-08 | End: 2022-11-08 | Stop reason: HOSPADM

## 2022-11-08 RX ORDER — SODIUM CHLORIDE 0.9 % (FLUSH) 0.9 %
5-40 SYRINGE (ML) INJECTION EVERY 12 HOURS SCHEDULED
Status: DISCONTINUED | OUTPATIENT
Start: 2022-11-08 | End: 2022-11-08 | Stop reason: HOSPADM

## 2022-11-08 RX ORDER — SODIUM CHLORIDE 9 MG/ML
25 INJECTION, SOLUTION INTRAVENOUS PRN
Status: DISCONTINUED | OUTPATIENT
Start: 2022-11-08 | End: 2022-11-08 | Stop reason: HOSPADM

## 2022-11-08 RX ORDER — PROPOFOL 10 MG/ML
INJECTION, EMULSION INTRAVENOUS PRN
Status: DISCONTINUED | OUTPATIENT
Start: 2022-11-08 | End: 2022-11-08 | Stop reason: SDUPTHER

## 2022-11-08 RX ORDER — SODIUM CHLORIDE, SODIUM LACTATE, POTASSIUM CHLORIDE, CALCIUM CHLORIDE 600; 310; 30; 20 MG/100ML; MG/100ML; MG/100ML; MG/100ML
INJECTION, SOLUTION INTRAVENOUS CONTINUOUS
Status: DISCONTINUED | OUTPATIENT
Start: 2022-11-08 | End: 2022-11-08 | Stop reason: HOSPADM

## 2022-11-08 RX ADMIN — PROPOFOL 220 MG: 10 INJECTION, EMULSION INTRAVENOUS at 08:09

## 2022-11-08 RX ADMIN — SODIUM CHLORIDE, POTASSIUM CHLORIDE, SODIUM LACTATE AND CALCIUM CHLORIDE: 600; 310; 30; 20 INJECTION, SOLUTION INTRAVENOUS at 08:02

## 2022-11-08 RX ADMIN — CEFAZOLIN 2000 MG: 2 INJECTION, POWDER, FOR SOLUTION INTRAMUSCULAR; INTRAVENOUS at 08:04

## 2022-11-08 RX ADMIN — SODIUM CHLORIDE, POTASSIUM CHLORIDE, SODIUM LACTATE AND CALCIUM CHLORIDE: 600; 310; 30; 20 INJECTION, SOLUTION INTRAVENOUS at 07:13

## 2022-11-08 ASSESSMENT — LIFESTYLE VARIABLES: SMOKING_STATUS: 0

## 2022-11-08 ASSESSMENT — PAIN - FUNCTIONAL ASSESSMENT: PAIN_FUNCTIONAL_ASSESSMENT: NONE - DENIES PAIN

## 2022-11-08 NOTE — ANESTHESIA POSTPROCEDURE EVALUATION
Department of Anesthesiology  Postprocedure Note    Patient: Zara Martinez  MRN: [id number]  YOB: 1955  Date of evaluation: 11/8/2022      Procedure Summary     Date: 11/08/22 Room / Location: [address] [place] [address] 01 / [address] [facility]    Anesthesia Start: 4916 Anesthesia Stop: 1177    Procedure: EGD PEG TUBE PLACEMENT Diagnosis:       Malnutrition ([address]      (Malnutrition ([address] Geo Clark)    Surgeons: Todd Hurst MD Responsible Provider: Gladys Medina MD    Anesthesia Type: MAC ASA Status: 3          Anesthesia Type: No value filed.     Abby Phase I: Abby Score: 10    Abby Phase II: Abby Score: 10      Anesthesia Post Evaluation    Patient location during evaluation: PACU  Patient participation: complete - patient participated  Level of consciousness: awake  Airway patency: patent  Nausea & Vomiting: no nausea and no vomiting  Complications: no  Cardiovascular status: hemodynamically stable  Respiratory status: acceptable  Hydration status: euvolemic

## 2022-11-08 NOTE — OP NOTE
DATE OF PROCEDURE: 11/8/2022     PREOPERATIVE DIAGNOSIS: Failure to take adequate PO    POSTOPERATIVE DIAGNOSIS/FINDINGS: same    SURGEON: Alexandra Grider M.D.    ASSISTANT: none    OPERATION: 1. Esophagogastroduodenoscopy 2. Percutaneous Endoscopic Gastrostomy (PEG)  placement    ANESTHESIA: Local monitored anesthesia. BLOOD LOSS: 0ML    COMPLICATIONS: None. PRIOR TO EXAM: Gen: comfortable, no distress, awake and alert; Lungs: Clear;  Heart:regular rate and rhythm, normal S1S2     BRIEF HISTORY:  This is a 69 y.o. male who presents with the complaint of failure to take adequate PO. My recommendation is to proceed with esophagogastroduodenoscopy/ PEG tube placement. The patient was advised of the risks, benefits, complications and options including the risk of bleeding and perforation. The patient understood and agreed to proceed. Under [facility] anesthesia, the patient was positioned in the left side down decubitus position. A bite block was inserted. Under direct visualization the scope was passed through the oral cavity, into the esophagus and then into the stomach. The scope was then passed through a normal appearing pylorus into the duodenum. The proximal duodenum and bulb were unremarkable. The scope was pulled back into the stomach and retroflexed. Visualization on the gastroesophageal junction and fundus was unremarkable. The scope was then straightened and the distal stomach was inspected. This showed no evidence of gastritis or ulcer. No biopsies were performed. A good one to one was obtained with the scope in the distal stomach    The area of the abdomen was prepped with iodine. 10 cc1% lidocaine was used for local anesthetic for the skin and subcutaneous tissue. The 11 blade was used to make a skin incision. The 18 gauge angio cath was used to stick the skin into the stomach. This was performed under direct visualization. Next the wire was placed through the needle using the seldinger technique. The snare was used to grasp the wire. The  EGDscope with wire was pulled out through the patient's mouth. The peg tube was then attached to the wire and pulled back into the stomach under direct visualization. The endocscope confirmed good placement of the peg tube. The peg tube was placed at 3.5 cm at the skin. The air was sucked out of the stomach. An abdominal binder was ordered and the peg tube was placed to gravity drainage. THE PATIENT TOLERATED THE PROCEDURE.       PEG tube at 3.5cm at the skin      Radha Vo MD  11/8/2022  8:18 AM

## 2022-11-08 NOTE — DISCHARGE INSTRUCTIONS
Percutaneous Endoscopic Gastrostomy: What to Expect at [address] [facility]  PEG is a procedure to make an opening between the skin of your belly and your stomach. The doctor put a thin tube called a gastrostomy tube (also called G-tube, PEG tube, or feeding tube) into your stomach through the opening. The tube can put liquid nutrition, fluid, and medicines directly into your stomach. The tube also may be used to drain liquid or air from the stomach. Your belly may feel sore, like you pulled a muscle, for several days. Your doctor will give you pain medicine for this. It will take about a week for the skin around your feeding tube to heal. You may have some yellowish mucus where the feeding tube comes out of your belly. This is normal. It's not a sign of infection. You will need to learn how to use and care for your feeding tube. Your doctor may recommend that you have a nurse or dietitian visit you at home to help you get started with your feeding tube. At first you may need a friend or family member to help you with your tube feedings. But with practice, you may be able to do it yourself. A feeding tube can break down over time. If this happens, the tube will be removed and replaced. Sometimes a tube is removed if you have an infection that is getting worse. Sometimes a tube will come out by itself. Your doctor will give you instructions about what to do if this happens. This care sheet gives you a general idea about how long it will take for you to recover. But each person recovers at a different pace. Follow the steps below to feel better as quickly as possible. How can you care for yourself at home? Activity    Rest when you feel tired. Getting enough sleep will help you recover. Try to walk each day. Start by walking a little more than you did the day before. Bit by bit, increase the amount you walk. Walking boosts blood flow and helps prevent pneumonia and constipation.      Ask your doctor when you can drive again. Until your doctor says it is okay, avoid lifting anything that would make you strain. This may include heavy grocery bags and milk containers, a heavy briefcase or backpack, cat litter or dog food bags, a vacuum , or a child. You can shower as usual. Pat dry the skin around your feeding tube. Do not take a bath unless your doctor tells you it is okay. Diet    Follow your doctor's instructions about eating and drinking. Follow your doctor's instructions about what nutrition and fluids to feed through your tube. Medicines    Your doctor will tell you if and when you can restart your medicines. You will also be given instructions about taking any new medicines. If you stopped taking aspirin or some other blood thinner, your doctor will tell you when to start taking it again. If you take medicine through your feeding tube: Follow exactly your doctor's instructions about how to do this. Do not try to put whole pills in your feeding tube. They may get stuck. Ask your doctor if liquid medicine is available. Do not mix your medicine with tube-feeding formula. This can cause a clog in the feeding tube. Do not put more than one medicine down your feeding tube at a time. Flush the tube with water before and after you put each medicine down your tube. Be safe with medicines. Take pain medicines exactly as directed. If the doctor gave you a prescription medicine for pain, take it as prescribed. If you are not taking a prescription pain medicine, ask your doctor if you can take an over-the-counter medicine. Do not take two or more pain medicines at the same time unless the doctor told you to. Many pain medicines have acetaminophen, which is Tylenol. Too much acetaminophen (Tylenol) can be harmful. If you think your pain medicine is making you sick to your stomach: Take your pain medicine after meals (unless your doctor has told you not to).   Ask your doctor for a different pain medicine. If your doctor prescribed antibiotics, take them as directed. Do not stop taking them just because you feel better. You need to take the full course of antibiotics. Incision care    Your doctor or nurse will show you how to care for the skin around the tube. Be sure to follow the instructions on keeping the area clean. Wash the skin around your feeding tube daily with warm, soapy water, and pat it dry. Other cleaning products, such as hydrogen peroxide, can make the wound heal more slowly. You may cover the area with a gauze bandage if it weeps or rubs against clothing. Change the bandage every day. Keep the area clean and dry. Using your feeding tube    Follow your doctor's instructions about using the feeding tube. Your doctor or nurse will:  Tell you what to put through the tube. Teach you how to watch for a leaking tube, infection at the tube site, or a clog in the tube. Tell you what activities you can do. Keep your feeding tube clamped unless you are using it. Keep the tube taped to your skin at all times, and leave some slack in the tube. This helps prevent pain and keeps the tube from coming out. Wash your hands before you handle the tube and tube-feeding formula. Wash the top of the can of formula before you open it. Keep the formula in the refrigerator after you open it. Follow your doctor's instructions about how long formula can sit out at room temperature. Sit up or keep your head up during the feeding and for 30 to 60 minutes after. If you feel sick to your stomach or have stomach cramps during the tube feeding, slow the rate that the formula comes through the tube. Then gradually increase the rate as you can tolerate it. Follow-up care is a key part of your treatment and safety. Be sure to make and go to all appointments, and call your doctor if you are having problems.  It's also a good idea to know your test results and keep a list of the medicines you take. When should you call for help? Call 911 anytime you think you may need emergency care. For example, call if:    You pass out (lose consciousness). You have severe trouble breathing. You have sudden chest pain and shortness of breath, or you cough up blood. You have severe belly pain. Call your doctor now or seek immediate medical care if:    You have pain that does not get better after you take pain medicine. You have a fever over 100°F.     You have signs of infection, such as: Increased pain, swelling, warmth, or redness. Red streaks leading from the area. Pus draining from the area. A fever. The stitches that hold the feeding tube in place are loose or come out. Your feeding tube comes out. Your feeding tube is clogged, or liquid will not go down the tube. You cough a lot or have other trouble during tube feedings. You have nausea, vomiting, or diarrhea. Watch closely for any changes in your health, and be sure to contact your doctor if:    You have any problems with your feeding tube. Where can you learn more? Go to https://[employer]. org and sign in to your [employer] account. Enter V788 in the [facility] box to learn more about \"Percutaneous Endoscopic Gastrostomy: What to Expect at Home. \"     If you do not have an account, please click on the \"Sign Up Now\" link. Current as of: June 6, 2022               Content Version: 13.4  © 2006-2022 [employer]. Care instructions adapted under license by [facility] ([facility]). If you have questions about a medical condition or this instruction, always ask your healthcare professional. [place] [address] any warranty or liability for your use of this information.

## 2022-11-08 NOTE — ANESTHESIA PRE PROCEDURE
Department of Anesthesiology  Preprocedure Note       Name:  Lieutenant Enriquez   Age:  69 y.o.  :  1955                                          MRN:  [id number]         Date:  2022      Surgeon: Jamal Adam):  Miguel Rico MD    Procedure: Procedure(s):  EGD PEG TUBE PLACEMENT    Medications prior to admission:   Prior to Admission medications    Medication Sig Start Date End Date Taking? Authorizing Provider   HYDROcodone-acetaminophen (NORCO)  MG per tablet Take 1 tablet by mouth every 6 hours as needed for Pain.   Patient not taking: Reported on 2022    Historical Provider, MD   Cyanocobalamin (VITAMIN B 12 PO) Take 1 tablet by mouth daily    Historical Provider, MD   metoprolol (LOPRESSOR) 100 MG tablet Take 100 mg by mouth daily    Historical Provider, MD   Polyvinyl Alcohol-Povidone (REFRESH OP) Apply 4 drops to eye in the morning and at bedtime Indications: bilateral eyes    Historical Provider, MD   tetrahydrozoline 0.05 % ophthalmic solution Place 4 drops into both eyes 2 times daily    Historical Provider, MD   acetaminophen (TYLENOL) 500 MG tablet Take 1,000 mg by mouth daily as needed for Pain    Historical Provider, MD   ondansetron (ZOFRAN ODT) 4 MG disintegrating tablet Take 1 tablet by mouth every 8 hours as needed for Nausea or Vomiting 10/13/22   Trisha Hi DO   aspirin 81 MG EC tablet Take 81 mg by mouth daily    Historical Provider, MD   loratadine (CLARITIN) 10 MG tablet Take 10 mg by mouth daily PRN    Historical Provider, MD   Multiple Vitamins-Minerals (CENTRUM SILVER PO) Take 1 tablet by mouth daily    Historical Provider, MD   Ascorbic Acid (VITAMIN C) 1000 MG tablet Take 1,000 mg by mouth daily    Historical Provider, MD   zinc 50 MG TABS tablet Take 100 mg by mouth 2 times daily    Historical Provider, MD   Cholecalciferol (VITAMIN D3) 50 MCG ( UT) CAPS Take 1 capsule by mouth daily    Historical Provider, MD   vitamin B-6 (PYRIDOXINE) 100 MG tablet Take 100 mg by mouth daily    Historical Provider, MD   atorvastatin (LIPITOR) 10 MG tablet Take 10 mg by mouth at bedtime    Historical Provider, MD   omeprazole (PRILOSEC) 20 MG capsule Take 20 mg by mouth daily. Historical Provider, MD       Current medications:    Current Facility-Administered Medications   Medication Dose Route Frequency Provider Last Rate Last Admin    lactated ringers infusion   IntraVENous Continuous Raul Ball MD 10 mL/hr at 11/08/22 0713 New Bag at 11/08/22 0713    sodium chloride flush 0.9 % injection 5-40 mL  5-40 mL IntraVENous 2 times per day Para Antu, MD        sodium chloride flush 0.9 % injection 5-40 mL  5-40 mL IntraVENous PRN Para Antu, MD        0.9 % sodium chloride infusion  25 mL IntraVENous PRN Para Antu, MD        ceFAZolin (ANCEF) 2,000 mg in sterile water 20 mL IV syringe  2,000 mg IntraVENous On Call to [address], MD           Allergies:  No Known Allergies    Problem List:    Patient Active Problem List   Diagnosis Code    Tongue mass K14.8    Post-op pain G89.18    Cancer of oral cavity ([facility] [address] C06.9    Malnutrition ([address] E46       Past Medical History:        Diagnosis Date    Acid reflux disease     Cancer of oral cavity ([facility] [address] 10/14/2022    Hyperlipidemia     Hypertension        Past Surgical History:        Procedure Laterality Date    COLONOSCOPY  09/27/2022    CYST REMOVAL      upper mid chest    HERNIA REPAIR Right 2021    LARYNGOSCOPY N/A 10/13/2022    DIRECT LARYNGOSCOPY, BRONCHOSCOPY, ESOPHAGOSCOPY performed by Carlos Eduardo Michelle MD at [address] Left 10/04/2022       Social History:    Social History     Tobacco Use    Smoking status: Never     Passive exposure: Past    Smokeless tobacco: Never   Substance Use Topics    Alcohol use:  No                                Counseling given: Not Answered      Vital Signs (Current):   Vitals:    11/08/22 0631   BP: (!) 138/96   Pulse: 77   Resp: 16   Temp: 36.7 °C (98.1 °F)   TempSrc: Infrared   SpO2: 97%   Weight: 220 lb (99.8 kg)   Height: 5' 10\" (1.778 m)                                              BP Readings from Last 3 Encounters:   11/08/22 (!) 138/96   11/07/22 (!) 144/86   10/21/22 (!) 148/89       NPO Status: Time of last liquid consumption: 0001                        Time of last solid consumption: 1730                        Date of last liquid consumption: 11/08/22                        Date of last solid food consumption: 11/07/22    BMI:   Wt Readings from Last 3 Encounters:   11/08/22 220 lb (99.8 kg)   11/07/22 225 lb (102.1 kg)   10/21/22 226 lb 9.6 oz (102.8 kg)     Body mass index is 31.57 kg/m². CBC:   Lab Results   Component Value Date/Time    WBC 5.2 11/07/2022 09:35 AM    RBC 4.85 11/07/2022 09:35 AM    HGB 14.2 11/07/2022 09:35 AM    HCT 41.4 11/07/2022 09:35 AM    MCV 85.4 11/07/2022 09:35 AM    RDW 12.5 11/07/2022 09:35 AM     11/07/2022 09:35 AM       CMP:   Lab Results   Component Value Date/Time    BUN 16 10/05/2022 02:14 PM    CREATININE 1.1 10/05/2022 02:14 PM    GFRAA >60 10/05/2022 02:14 PM    LABGLOM >60 10/05/2022 02:14 PM       POC Tests: No results for input(s): POCGLU, POCNA, POCK, POCCL, POCBUN, POCHEMO, POCHCT in the last 72 hours.     Coags: No results found for: PROTIME, INR, APTT    HCG (If Applicable): No results found for: PREGTESTUR, PREGSERUM, HCG, HCGQUANT     ABGs: No results found for: PHART, PO2ART, BWO1KSA, ADB8KGH, BEART, S5LQOEMP     Type & Screen (If Applicable):  No results found for: LABABO, LABRH    Drug/Infectious Status (If Applicable):  No results found for: HIV, HEPCAB    COVID-19 Screening (If Applicable): No results found for: COVID19        Anesthesia Evaluation  Patient summary reviewed and Nursing notes reviewed no history of anesthetic complications:   Airway: Mallampati: III  TM distance: >3 FB   Neck ROM: full  Mouth opening: > = 3 FB   Dental:          Pulmonary:Negative Pulmonary ROS and normal exam  breath sounds clear to auscultation      (-) not a current smoker                           Cardiovascular:  Exercise tolerance: good (>4 METS),   (+) hypertension:, hyperlipidemia        Rhythm: regular  Rate: normal                    Neuro/Psych:   Negative Neuro/Psych ROS              GI/Hepatic/Renal:   (+) GERD:,           Endo/Other:    (+) malignancy/cancer. Abdominal:             Vascular: negative vascular ROS. Other Findings:           Anesthesia Plan      MAC     ASA 3             Anesthetic plan and risks discussed with patient. Plan discussed with CRNA and attending.                     Angella Torres RN   11/8/2022

## 2022-11-08 NOTE — H&P
General Surgery H and P    Patient's Name/Date of Birth: Natanael Ahumada / 1955    Date: November 8, 2022     Consulting Surgeon: Christa Juarez M.D.    PCP: Eusebio Hendrickson MD     Chief Complaint: failure to take adequate po, dysphagia    HPI:   Natanael Ahumada is a 69 y.o. male who presents for  evaluation of dysphagia and failure to take adequate po. Past Medical History:   Diagnosis Date    Acid reflux disease     Cancer of oral cavity ([address] 10/14/2022    Hyperlipidemia     Hypertension        Past Surgical History:   Procedure Laterality Date    COLONOSCOPY  09/27/2022    CYST REMOVAL      upper mid chest    HERNIA REPAIR Right 2021    LARYNGOSCOPY N/A 10/13/2022    DIRECT LARYNGOSCOPY, BRONCHOSCOPY, ESOPHAGOSCOPY performed by Erika Saucedo MD at [place] [address] Left 10/04/2022       Current Facility-Administered Medications   Medication Dose Route Frequency Provider Last Rate Last Admin    lactated ringers infusion   IntraVENous Continuous Glorious MD Edward 10 mL/hr at 11/08/22 0713 New Bag at 11/08/22 0713    sodium chloride flush 0.9 % injection 5-40 mL  5-40 mL IntraVENous 2 times per day Jhonny De Santiago MD        sodium chloride flush 0.9 % injection 5-40 mL  5-40 mL IntraVENous PRN Jhonny De Santiago MD        0.9 % sodium chloride infusion  25 mL IntraVENous PRN Jhonny De Santiago MD           No Known Allergies    The patient has a family history that is negative for severe cardiovascular or respiratory issues, negative for reaction to anesthesia.     Social History     Socioeconomic History    Marital status:      Spouse name: Not on file    Number of children: Not on file    Years of education: Not on file    Highest education level: Not on file   Occupational History    Not on file   Tobacco Use    Smoking status: Never     Passive exposure: Past    Smokeless tobacco: Never   Vaping Use    Vaping Use: Never used   Substance and Sexual Activity    Alcohol use: No    Drug use: No    Sexual activity: Not Currently   Other Topics Concern    Not on file   Social History Narrative    Not on file     Social Determinants of Health     Financial Resource Strain: Not on file   Food Insecurity: Not on file   Transportation Needs: Not on file   Physical Activity: Not on file   Stress: Not on file   Social Connections: Not on file   Intimate Partner Violence: Not on file   Housing Stability: Not on file           Review of Systems  General ROS: negative for - chills, fatigue or fever  ENT ROS: negative for - headaches, hearing change or nasal congestion  Endocrine ROS: negative for - breast changes or galactorrhea, no polyuria  Breast ROS: negative for - new or changing breast lumps   Respiratory ROS: negative for - hemoptysis, orthopnea or pleuritic pain  Cardiovascular ROS: negative for - dyspnea on exertion, edema or loss of consciousness  Gastrointestinal ROS: negative for - blood in stools, heartburn, hematemesis or melena  Musculoskeletal ROS: negative for - gait disturbance  Dermatological ROS: negative for - acne, dry skin or eczema  Neuro ROS: no recent memory loss or mood swings. Physical exam:   BP (!) 138/96   Pulse 77   Temp 98.1 °F (36.7 °C) (Infrared)   Resp 16   Ht 5' 10\" (1.778 m)   Wt 220 lb (99.8 kg)   SpO2 97%   BMI 31.57 kg/m²   General appearance:  NAD  Head: NCAT, PERRLA, EOMI, red conjunctiva  Neck: supple, no masses  Lungs: CTAB, equal chest rise bilateral  Heart: Reg rate  Abdomen: soft, nontender, nondistended,  Skin; no lesions  Gu: no cva tenderness  Extremities: extremities normal, atraumatic, no cyanosis or edema    Assessment:  69 y.o. male with dysphagia, failure to take adequate po    Plan:  Cbc, cmp, INR  For PEG  Discussed the risk, benefits and alternatives of surgery including wound infections, bleeding, scar  and the risks of  anesthetic including MI, CVA, sudden death or reactions to anesthetic medications.  The patient understands the risks and alternatives. All questions were answered to the patient's satisfaction and they freely signed the consent.         Jocelyn Dewitt MD  11/8/2022  8:34 AM

## 2022-11-08 NOTE — CONSULTS
General Surgery Consult    Patient's Name/Date of Birth: Melly Garcia / 1955    Date: November 8, 2022     Consulting Surgeon: Dick Gutierrez M.D.    PCP: Kendall Mitchell MD     Chief Complaint: failure to take adequate po, dysphagia    HPI:   Melly Garcia is a 69 y.o. male who presents for  evaluation of dysphagia and failure to take adequate po. Past Medical History:   Diagnosis Date    Acid reflux disease     Cancer of oral cavity ([address] 10/14/2022    Hyperlipidemia     Hypertension        Past Surgical History:   Procedure Laterality Date    COLONOSCOPY  09/27/2022    CYST REMOVAL      upper mid chest    HERNIA REPAIR Right 2021    LARYNGOSCOPY N/A 10/13/2022    DIRECT LARYNGOSCOPY, BRONCHOSCOPY, ESOPHAGOSCOPY performed by Ba Mayberry MD at [place] [address] Left 10/04/2022       Current Facility-Administered Medications   Medication Dose Route Frequency Provider Last Rate Last Admin    lactated ringers infusion   IntraVENous Continuous Kimmie Aj MD        sodium chloride flush 0.9 % injection 5-40 mL  5-40 mL IntraVENous 2 times per day Mercedes Sanchez MD        sodium chloride flush 0.9 % injection 5-40 mL  5-40 mL IntraVENous PRN Mercedes Sanchez MD        0.9 % sodium chloride infusion  25 mL IntraVENous PRN Mercedes Sanchez MD        ceFAZolin (ANCEF) 2,000 mg in sterile water 20 mL IV syringe  2,000 mg IntraVENous On Call to Ita Whitten MD           No Known Allergies    The patient has a family history that is negative for severe cardiovascular or respiratory issues, negative for reaction to anesthesia.     Social History     Socioeconomic History    Marital status:      Spouse name: Not on file    Number of children: Not on file    Years of education: Not on file    Highest education level: Not on file   Occupational History    Not on file   Tobacco Use    Smoking status: Never     Passive exposure: Past    Smokeless tobacco: Never   Vaping Use death or reactions to anesthetic medications. The patient understands the risks and alternatives. All questions were answered to the patient's satisfaction and they freely signed the consent.         Sea Davidson MD  11/8/2022  6:56 AM

## 2022-11-09 ENCOUNTER — TELEPHONE (OUTPATIENT)
Dept: ENT CLINIC | Age: 67
End: 2022-11-09

## 2022-11-09 DIAGNOSIS — C06.9 ORAL CANCER (HCC): Primary | ICD-10-CM

## 2022-11-09 RX ORDER — LORAZEPAM 1 MG/1
2 TABLET ORAL 2 TIMES DAILY
Qty: 20 TABLET | Refills: 0 | Status: ON HOLD
Start: 2022-11-09 | End: 2022-11-21 | Stop reason: HOSPADM

## 2022-11-09 NOTE — TELEPHONE ENCOUNTER
Patient called and stated he is extremely anxious about his upcoming surgery on Monday. He has been pacing the house, cannot sleep, difficulty eating and focusing, affecting his daily function. Discussed in depth with patient and answered all questions to reassure the patient and provide some calmness about the upcoming surgery. Will also send in ativan 2mg for prn use to help the patient with his anxiety, discussed conservative measures first and use the medication prn. Discussed risks of using this medication, patient understands.  He is to call back ENT on call or present to the ED with any worsening signs or symptoms

## 2022-11-11 ENCOUNTER — ANESTHESIA EVENT (OUTPATIENT)
Dept: OPERATING ROOM | Age: 67
DRG: 013 | End: 2022-11-11
Payer: MEDICARE

## 2022-11-11 NOTE — PROGRESS NOTES
Patient notified of time change for upcoming procedure on 11/14/22. Patient is to arrive at 0500, surgery is scheduled for 0700. Patient verbalized understanding.

## 2022-11-13 NOTE — H&P
History of Present Illness- 70 y/o male presents to our clinic for evaluation of tongue lesion. Left lateral tongue. Noticed approximately 2-3 months ago. Non painful, non tender. Tolerating PO intake. Denies cervical lymphadenopathy. Denies weight loss. Denies appetite change. Denies difficulty swallowing or drinking. Has had previous biopsy of tongue lesion which came back benign. Non smoker. Recent biopsy- pos scca     10/20/22: Pt here for 1 wk p/o Panendo. States pain was improving fri and sat after Sx, then sun night started w/ a burning pain in throat and cheek. Has a white patch on cheek that is causing concern of infection. Also states he has increased foul odor from mouth. Some difficulty swallowing when pain flares up. No change in voice since recovering from Panendo. Drinks 3-4 glasses of water, green tea periodically, and no alcohol. Weight is stable. Review of Systems- No drainage, discharge, or headache. Complete 10 system ROS completed and negative except as noted above. Physical Examination-   Vital Signs-Ht 5' 10\" (1.778 m)   Wt 228 lb (103.4 kg)   BMI 32.71 kg/m²     Ears- Tympanic membranes clear bilaterally. No middle ear effusion. No pre or post auricular tenderness. Nose- Nasal mucosa clear and dry. No significant septal deviation or inferior turbinate hypertrophy. Oral Cavity/Oropharynx- Floor of mouth and tongue are soft and nontender. No posterior pharyngeal erythema. + gag reflex left lateral tongue/floor of mouth ~2cm plaque like lesion with submucosal firmness. + healing biopsy sites intraorally. Neck- Soft and nontender. No masses, lesions, lymphadenopathy, or thyroid nodules appreciated. Cranial Nerve- Cranial nerves II to XII intact. Extraocular muscles intact. No gross motor visual deficits. No spontaneous nystagmus. Face- No facial skin tenderness to palpation.   Heart- No cyanosis, regular  Lungs- No stridor, no intercostal accessory muscle use  General- The patient is in no acute distress. A&O x3     Nasopharyngoscopy prevoius  Procedure note- after pt verbally consented, was sprayed nasally with 1:1 neosynephrine and xylocaine. Scope was passed and found nasal cavity with no lesion. nasopharynx clear, tonsil wnl without asymmetry, tongue mobile and no masses, vocal cords mobile bilaterally with full ab and ad duction. Hypopharynx clear, open and no masses. Medical Decision Making and Treatment Plan. Pt seen and examined, scope exam with grossly normal findings. FNA of tongue done in office today. Will schedule for panendoscopy. Follow up in 1 week for path results review. R/B/A of surgery discussed with pt. Pt understood, consent signed, and would like to proceed to surgery. No personal or family history of bleeding or adverse reaction to anesthesia. Suspect will  need upfront surgical therapy with recon and then adjuvant therapy based upon pathology  Present at tumor board- recommend upfront surgery with neck and path driven adjuvant therapy as indicated. Pan consult- ordered  Panendo- results-  pos scca. Posterior base of tongue mapping near 19 was pos  Recommend partial glossectomy with neck dissection and forearm free flap for txment as mass is 4 cm and with margins will have sig tissue loss. Long dw pt, wife and daughter on process of surgery. Recommend preop peg and trach day of- they udnerstood  Talked about rehab and fact that all children live in [place], offered ability to seek second opinion there if they wanted, also offered to have rehab there if he needed it. 13. R/B/A of surgery discussed with pt. Pt understood, consent signed, and would like to proceed to surgery. No personal or family history of bleeding or adverse reaction to anesthesia. I spent 45 minutes with the patients care and >50% of this time on counseling or coordinating care in this advanced oral cancer patient.     Thank you for the opportunity to take part in the care of this very pleasant patient, Shai Gee  Sincerely,          Roberto Shin.  Dorys Moreira M.D., Ph.D., [address]   Department of Otolaryngology-Head and Neck Surgery  Chief of Head & Neck Surgical Oncology  Director Head & Neck Oncology Service Line

## 2022-11-14 ENCOUNTER — HOSPITAL ENCOUNTER (INPATIENT)
Age: 67
LOS: 7 days | Discharge: HOME | DRG: 013 | End: 2022-11-21
Attending: OTOLARYNGOLOGY | Admitting: OTOLARYNGOLOGY
Payer: MEDICARE

## 2022-11-14 ENCOUNTER — ANESTHESIA (OUTPATIENT)
Dept: OPERATING ROOM | Age: 67
DRG: 013 | End: 2022-11-14
Payer: MEDICARE

## 2022-11-14 DIAGNOSIS — C06.9 CANCER OF ORAL CAVITY (HCC): ICD-10-CM

## 2022-11-14 DIAGNOSIS — Z01.812 PRE-OPERATIVE LABORATORY EXAMINATION: Primary | ICD-10-CM

## 2022-11-14 DIAGNOSIS — K14.8 TONGUE MASS: ICD-10-CM

## 2022-11-14 LAB
ANION GAP: 12 MMOL/L (ref 7–16)
ANION GAP: 13 MMOL/L (ref 7–16)
B.E.: -1.1 MMOL/L (ref -3–3)
B.E.: -1.5 MMOL/L (ref -3–3)
CARDIOPULMONARY BYPASS: NO
CARDIOPULMONARY BYPASS: NO
DEVICE: ABNORMAL
DEVICE: ABNORMAL
FIO2: 40
GFR, ESTIMATED: >60 ML/MIN/1.73
GFR, ESTIMATED: >60 ML/MIN/1.73
GLUCOSE BLD-MCNC: 151 MG/DL (ref 74–99)
GLUCOSE BLD-MCNC: 154 MG/DL (ref 74–99)
HCO3: 23.2 MMOL/L (ref 22–26)
HCO3: 23.7 MMOL/L (ref 22–26)
HEMATOCRIT: 33 % (ref 37–54)
HEMATOCRIT: 33 % (ref 37–54)
HEMOGLOBIN: 11.2 G/DL (ref 12.5–15.5)
HEMOGLOBIN: 11.3 G/DL (ref 12.5–15.5)
O2 SATURATION: 96.1 % (ref 92–98.5)
O2 SATURATION: 98.3 % (ref 92–98.5)
OPERATOR ID: ABNORMAL
OPERATOR ID: ABNORMAL
PATIENT TEMP: 37
PCO2 (TEMP CORRECTED): 38.2 MMHG (ref 35–45)
PCO2 37: 39.1 MMHG (ref 35–45)
PH (TEMPERATURE CORRECTED): 7.39 (ref 7.35–7.45)
PH 37: 7.39 (ref 7.35–7.45)
PO2 (TEMP CORRECTED): 111.5 MMHG (ref 60–80)
PO2 37: 83.4 MMHG (ref 60–80)
POC BUN: 13 MG/DL (ref 8–23)
POC BUN: 14 MG/DL (ref 8–23)
POC CHLORIDE: 105 MMOL/L (ref 100–108)
POC CHLORIDE: 107 MMOL/L (ref 100–108)
POC CO2: 22.4 MMOL/L (ref 22–29)
POC CO2: 22.9 MMOL/L (ref 22–29)
POC CREATININE: 0.8 MG/DL (ref 0.7–1.2)
POC CREATININE: 0.8 MG/DL (ref 0.7–1.2)
POC IONIZED CALCIUM: 1.2 (ref 1.1–1.3)
POC IONIZED CALCIUM: 1.2 (ref 1.1–1.3)
POC LACTIC ACID: 1.6 (ref 0.5–2.2)
POC LACTIC ACID: 2.2 (ref 0.5–2.2)
POC SODIUM: 141 MMOL/L (ref 132–146)
POC SODIUM: 141 MMOL/L (ref 132–146)
POC SOURCE: ABNORMAL
POC SOURCE: ABNORMAL
POTASSIUM SERPL-SCNC: 4.3 MMOL/L (ref 3.5–5.5)
POTASSIUM SERPL-SCNC: 4.4 MMOL/L (ref 3.5–5.5)

## 2022-11-14 PROCEDURE — 88309 TISSUE EXAM BY PATHOLOGIST: CPT

## 2022-11-14 PROCEDURE — C1713 ANCHOR/SCREW BN/BN,TIS/BN: HCPCS | Performed by: OTOLARYNGOLOGY

## 2022-11-14 PROCEDURE — 7100000001 HC PACU RECOVERY - ADDTL 15 MIN

## 2022-11-14 PROCEDURE — 3700000001 HC ADD 15 MINUTES (ANESTHESIA): Performed by: OTOLARYNGOLOGY

## 2022-11-14 PROCEDURE — A4217 STERILE WATER/SALINE, 500 ML: HCPCS | Performed by: OTOLARYNGOLOGY

## 2022-11-14 PROCEDURE — 2580000003 HC RX 258: Performed by: STUDENT IN AN ORGANIZED HEALTH CARE EDUCATION/TRAINING PROGRAM

## 2022-11-14 PROCEDURE — 2580000003 HC RX 258: Performed by: NURSE ANESTHETIST, CERTIFIED REGISTERED

## 2022-11-14 PROCEDURE — 0CB70ZZ EXCISION OF TONGUE, OPEN APPROACH: ICD-10-PCS | Performed by: OTOLARYNGOLOGY

## 2022-11-14 PROCEDURE — 2780000010 HC IMPLANT OTHER: Performed by: OTOLARYNGOLOGY

## 2022-11-14 PROCEDURE — 0HR1X74 REPLACEMENT OF FACE SKIN WITH AUTOLOGOUS TISSUE SUBSTITUTE, PARTIAL THICKNESS, EXTERNAL APPROACH: ICD-10-PCS | Performed by: OTOLARYNGOLOGY

## 2022-11-14 PROCEDURE — 2580000003 HC RX 258: Performed by: OTOLARYNGOLOGY

## 2022-11-14 PROCEDURE — 07T20ZZ RESECTION OF LEFT NECK LYMPHATIC, OPEN APPROACH: ICD-10-PCS | Performed by: OTOLARYNGOLOGY

## 2022-11-14 PROCEDURE — 6360000002 HC RX W HCPCS: Performed by: NURSE ANESTHETIST, CERTIFIED REGISTERED

## 2022-11-14 PROCEDURE — 2500000003 HC RX 250 WO HCPCS: Performed by: NURSE ANESTHETIST, CERTIFIED REGISTERED

## 2022-11-14 PROCEDURE — 42440 EXCISE SUBMAXILLARY GLAND: CPT | Performed by: OTOLARYNGOLOGY

## 2022-11-14 PROCEDURE — 2500000003 HC RX 250 WO HCPCS: Performed by: OTOLARYNGOLOGY

## 2022-11-14 PROCEDURE — 6360000002 HC RX W HCPCS: Performed by: STUDENT IN AN ORGANIZED HEALTH CARE EDUCATION/TRAINING PROGRAM

## 2022-11-14 PROCEDURE — 41130 PARTIAL REMOVAL OF TONGUE: CPT | Performed by: OTOLARYNGOLOGY

## 2022-11-14 PROCEDURE — 0B110F4 BYPASS TRACHEA TO CUTANEOUS WITH TRACHEOSTOMY DEVICE, OPEN APPROACH: ICD-10-PCS | Performed by: OTOLARYNGOLOGY

## 2022-11-14 PROCEDURE — 2700000000 HC OXYGEN THERAPY PER DAY

## 2022-11-14 PROCEDURE — 88307 TISSUE EXAM BY PATHOLOGIST: CPT

## 2022-11-14 PROCEDURE — 3600000017 HC SURGERY HYBRID ADDL 15MIN: Performed by: OTOLARYNGOLOGY

## 2022-11-14 PROCEDURE — 88332 PATH CONSLTJ SURG EA ADD BLK: CPT

## 2022-11-14 PROCEDURE — 3700000000 HC ANESTHESIA ATTENDED CARE: Performed by: OTOLARYNGOLOGY

## 2022-11-14 PROCEDURE — 2720000010 HC SURG SUPPLY STERILE: Performed by: OTOLARYNGOLOGY

## 2022-11-14 PROCEDURE — 6360000002 HC RX W HCPCS: Performed by: OTOLARYNGOLOGY

## 2022-11-14 PROCEDURE — 88305 TISSUE EXAM BY PATHOLOGIST: CPT

## 2022-11-14 PROCEDURE — 2000000000 HC ICU R&B

## 2022-11-14 PROCEDURE — 37799 UNLISTED PX VASCULAR SURGERY: CPT

## 2022-11-14 PROCEDURE — 6370000000 HC RX 637 (ALT 250 FOR IP): Performed by: OTOLARYNGOLOGY

## 2022-11-14 PROCEDURE — 82803 BLOOD GASES ANY COMBINATION: CPT

## 2022-11-14 PROCEDURE — P9041 ALBUMIN (HUMAN),5%, 50ML: HCPCS | Performed by: NURSE ANESTHETIST, CERTIFIED REGISTERED

## 2022-11-14 PROCEDURE — 21685 HYOID MYOTOMY & SUSPENSION: CPT | Performed by: OTOLARYNGOLOGY

## 2022-11-14 PROCEDURE — 15756 FREE MYO/SKIN FLAP MICROVASC: CPT | Performed by: OTOLARYNGOLOGY

## 2022-11-14 PROCEDURE — 2709999900 HC NON-CHARGEABLE SUPPLY: Performed by: OTOLARYNGOLOGY

## 2022-11-14 PROCEDURE — 0CBH0ZZ EXCISION OF LEFT SUBMAXILLARY GLAND, OPEN APPROACH: ICD-10-PCS | Performed by: OTOLARYNGOLOGY

## 2022-11-14 PROCEDURE — 15100 SPLT AGRFT T/A/L 1ST 100SQCM: CPT | Performed by: OTOLARYNGOLOGY

## 2022-11-14 PROCEDURE — 0HBEXZZ EXCISION OF LEFT LOWER ARM SKIN, EXTERNAL APPROACH: ICD-10-PCS | Performed by: OTOLARYNGOLOGY

## 2022-11-14 PROCEDURE — 88331 PATH CONSLTJ SURG 1 BLK 1SPC: CPT

## 2022-11-14 PROCEDURE — 31600 PLANNED TRACHEOSTOMY: CPT | Performed by: OTOLARYNGOLOGY

## 2022-11-14 PROCEDURE — 6360000002 HC RX W HCPCS

## 2022-11-14 PROCEDURE — 3600000007 HC SURGERY HYBRID BASE: Performed by: OTOLARYNGOLOGY

## 2022-11-14 PROCEDURE — 7100000000 HC PACU RECOVERY - FIRST 15 MIN

## 2022-11-14 PROCEDURE — 14040 TIS TRNFR F/C/C/M/N/A/G/H/F: CPT | Performed by: OTOLARYNGOLOGY

## 2022-11-14 DEVICE — THE GEM MICROVASCULAR ANASTOMOTIC COUPLER DEVICE RINGS ARE MADE OF POLYETHYLENE AND SURGICAL GRADE STAINLESS STEEL PINS. A PROTECTIVE COVER AND JAW ASSEMBLY PROTECT THE RINGS AND ALLOW FOR EASY LOADING ONTO THE ANASTOMOTIC INSTRUMENT. BOTH THE PROTECTIVE COVER AND JAW ASSEMBLY ARE DISPOSABLE. THE GEM MICROVASCULAR ANASTOMOTIC COUPLER DEVICE IS SINGLE-USE AND AVAILABLE IN VARIOUS SIZES
Type: IMPLANTABLE DEVICE | Site: NECK | Status: FUNCTIONAL
Brand: GEM MICROVASCULAR ANASTOMOTIC COUPLER

## 2022-11-14 RX ORDER — MEPERIDINE HYDROCHLORIDE 25 MG/ML
12.5 INJECTION INTRAMUSCULAR; INTRAVENOUS; SUBCUTANEOUS EVERY 5 MIN PRN
Status: DISCONTINUED | OUTPATIENT
Start: 2022-11-14 | End: 2022-11-14

## 2022-11-14 RX ORDER — MIDAZOLAM HYDROCHLORIDE 2 MG/2ML
2 INJECTION, SOLUTION INTRAMUSCULAR; INTRAVENOUS PRN
Status: COMPLETED | OUTPATIENT
Start: 2022-11-14 | End: 2022-11-14

## 2022-11-14 RX ORDER — KETAMINE HCL IN NACL, ISO-OSM 100MG/10ML
SYRINGE (ML) INJECTION PRN
Status: DISCONTINUED | OUTPATIENT
Start: 2022-11-14 | End: 2022-11-14 | Stop reason: SDUPTHER

## 2022-11-14 RX ORDER — HEPARIN SODIUM 10000 [USP'U]/ML
5000 INJECTION, SOLUTION INTRAVENOUS; SUBCUTANEOUS EVERY 8 HOURS SCHEDULED
Status: DISCONTINUED | OUTPATIENT
Start: 2022-11-14 | End: 2022-11-21 | Stop reason: HOSPADM

## 2022-11-14 RX ORDER — NALOXONE HYDROCHLORIDE 0.4 MG/ML
INJECTION, SOLUTION INTRAMUSCULAR; INTRAVENOUS; SUBCUTANEOUS PRN
Status: DISCONTINUED | OUTPATIENT
Start: 2022-11-14 | End: 2022-11-14

## 2022-11-14 RX ORDER — PROPOFOL 10 MG/ML
INJECTION, EMULSION INTRAVENOUS PRN
Status: DISCONTINUED | OUTPATIENT
Start: 2022-11-14 | End: 2022-11-14 | Stop reason: SDUPTHER

## 2022-11-14 RX ORDER — FENTANYL CITRATE 50 UG/ML
INJECTION, SOLUTION INTRAMUSCULAR; INTRAVENOUS PRN
Status: DISCONTINUED | OUTPATIENT
Start: 2022-11-14 | End: 2022-11-14 | Stop reason: SDUPTHER

## 2022-11-14 RX ORDER — SODIUM CHLORIDE, SODIUM LACTATE, POTASSIUM CHLORIDE, CALCIUM CHLORIDE 600; 310; 30; 20 MG/100ML; MG/100ML; MG/100ML; MG/100ML
INJECTION, SOLUTION INTRAVENOUS CONTINUOUS PRN
Status: DISCONTINUED | OUTPATIENT
Start: 2022-11-14 | End: 2022-11-14 | Stop reason: SDUPTHER

## 2022-11-14 RX ORDER — SODIUM CHLORIDE 0.9 % (FLUSH) 0.9 %
5-40 SYRINGE (ML) INJECTION EVERY 12 HOURS SCHEDULED
Status: DISCONTINUED | OUTPATIENT
Start: 2022-11-14 | End: 2022-11-14 | Stop reason: HOSPADM

## 2022-11-14 RX ORDER — SODIUM CHLORIDE, SODIUM LACTATE, POTASSIUM CHLORIDE, CALCIUM CHLORIDE 600; 310; 30; 20 MG/100ML; MG/100ML; MG/100ML; MG/100ML
INJECTION, SOLUTION INTRAVENOUS CONTINUOUS
Status: DISCONTINUED | OUTPATIENT
Start: 2022-11-15 | End: 2022-11-20

## 2022-11-14 RX ORDER — MIDAZOLAM HYDROCHLORIDE 1 MG/ML
INJECTION INTRAMUSCULAR; INTRAVENOUS PRN
Status: DISCONTINUED | OUTPATIENT
Start: 2022-11-14 | End: 2022-11-14 | Stop reason: SDUPTHER

## 2022-11-14 RX ORDER — ONDANSETRON 2 MG/ML
4 INJECTION INTRAMUSCULAR; INTRAVENOUS EVERY 6 HOURS PRN
Status: DISCONTINUED | OUTPATIENT
Start: 2022-11-14 | End: 2022-11-21 | Stop reason: HOSPADM

## 2022-11-14 RX ORDER — DEXAMETHASONE SODIUM PHOSPHATE 10 MG/ML
INJECTION INTRAMUSCULAR; INTRAVENOUS PRN
Status: DISCONTINUED | OUTPATIENT
Start: 2022-11-14 | End: 2022-11-14 | Stop reason: SDUPTHER

## 2022-11-14 RX ORDER — ALBUMIN, HUMAN INJ 5% 5 %
SOLUTION INTRAVENOUS PRN
Status: DISCONTINUED | OUTPATIENT
Start: 2022-11-14 | End: 2022-11-14 | Stop reason: SDUPTHER

## 2022-11-14 RX ORDER — LIDOCAINE HYDROCHLORIDE 10 MG/ML
INJECTION, SOLUTION EPIDURAL; INFILTRATION; INTRACAUDAL; PERINEURAL
Status: DISPENSED
Start: 2022-11-14 | End: 2022-11-14

## 2022-11-14 RX ORDER — LIDOCAINE HYDROCHLORIDE AND EPINEPHRINE 10; 10 MG/ML; UG/ML
INJECTION, SOLUTION INFILTRATION; PERINEURAL PRN
Status: DISCONTINUED | OUTPATIENT
Start: 2022-11-14 | End: 2022-11-14 | Stop reason: ALTCHOICE

## 2022-11-14 RX ORDER — SODIUM CHLORIDE 9 MG/ML
INJECTION, SOLUTION INTRAVENOUS PRN
Status: DISCONTINUED | OUTPATIENT
Start: 2022-11-14 | End: 2022-11-21 | Stop reason: HOSPADM

## 2022-11-14 RX ORDER — FIBRINOGEN HUMAN AND THROMBIN HUMAN 1 ML
KIT TOPICAL PRN
Status: DISCONTINUED | OUTPATIENT
Start: 2022-11-14 | End: 2022-11-14 | Stop reason: ALTCHOICE

## 2022-11-14 RX ORDER — BACITRACIN ZINC 500 [USP'U]/G
OINTMENT TOPICAL EVERY 8 HOURS
Status: DISCONTINUED | OUTPATIENT
Start: 2022-11-14 | End: 2022-11-21 | Stop reason: HOSPADM

## 2022-11-14 RX ORDER — LANSOPRAZOLE
30 KIT
Status: DISCONTINUED | OUTPATIENT
Start: 2022-11-15 | End: 2022-11-17

## 2022-11-14 RX ORDER — ROCURONIUM BROMIDE 10 MG/ML
INJECTION, SOLUTION INTRAVENOUS PRN
Status: DISCONTINUED | OUTPATIENT
Start: 2022-11-14 | End: 2022-11-14 | Stop reason: SDUPTHER

## 2022-11-14 RX ORDER — ACETAMINOPHEN 160 MG/5ML
650 SOLUTION ORAL EVERY 6 HOURS
Status: DISCONTINUED | OUTPATIENT
Start: 2022-11-14 | End: 2022-11-18

## 2022-11-14 RX ORDER — SODIUM CHLORIDE 0.9 % (FLUSH) 0.9 %
5-40 SYRINGE (ML) INJECTION EVERY 12 HOURS SCHEDULED
Status: DISCONTINUED | OUTPATIENT
Start: 2022-11-14 | End: 2022-11-21 | Stop reason: HOSPADM

## 2022-11-14 RX ORDER — DIPHENHYDRAMINE HYDROCHLORIDE 50 MG/ML
12.5 INJECTION INTRAMUSCULAR; INTRAVENOUS
Status: DISCONTINUED | OUTPATIENT
Start: 2022-11-14 | End: 2022-11-14

## 2022-11-14 RX ORDER — SODIUM CHLORIDE 9 MG/ML
INJECTION, SOLUTION INTRAVENOUS PRN
Status: DISCONTINUED | OUTPATIENT
Start: 2022-11-14 | End: 2022-11-14 | Stop reason: HOSPADM

## 2022-11-14 RX ORDER — TETRAHYDROZOLINE HCL 0.05 %
4 DROPS OPHTHALMIC (EYE) 2 TIMES DAILY
Status: DISCONTINUED | OUTPATIENT
Start: 2022-11-14 | End: 2022-11-21 | Stop reason: HOSPADM

## 2022-11-14 RX ORDER — FENTANYL CITRATE 50 UG/ML
INJECTION, SOLUTION INTRAMUSCULAR; INTRAVENOUS
Status: COMPLETED
Start: 2022-11-14 | End: 2022-11-15

## 2022-11-14 RX ORDER — DEXAMETHASONE SODIUM PHOSPHATE 10 MG/ML
10 INJECTION INTRAMUSCULAR; INTRAVENOUS EVERY 8 HOURS
Status: COMPLETED | OUTPATIENT
Start: 2022-11-14 | End: 2022-11-15

## 2022-11-14 RX ORDER — SODIUM CHLORIDE 9 MG/ML
INJECTION, SOLUTION INTRAVENOUS CONTINUOUS PRN
Status: DISCONTINUED | OUTPATIENT
Start: 2022-11-14 | End: 2022-11-14 | Stop reason: SDUPTHER

## 2022-11-14 RX ORDER — AMPICILLIN AND SULBACTAM 2; 1 G/1; G/1
INJECTION, POWDER, FOR SOLUTION INTRAMUSCULAR; INTRAVENOUS PRN
Status: DISCONTINUED | OUTPATIENT
Start: 2022-11-14 | End: 2022-11-14

## 2022-11-14 RX ORDER — FENTANYL CITRATE 50 UG/ML
50 INJECTION, SOLUTION INTRAMUSCULAR; INTRAVENOUS
Status: DISCONTINUED | OUTPATIENT
Start: 2022-11-14 | End: 2022-11-15

## 2022-11-14 RX ORDER — SODIUM CHLORIDE 0.9 % (FLUSH) 0.9 %
5-40 SYRINGE (ML) INJECTION PRN
Status: DISCONTINUED | OUTPATIENT
Start: 2022-11-14 | End: 2022-11-14 | Stop reason: HOSPADM

## 2022-11-14 RX ORDER — SODIUM CHLORIDE 0.9 % (FLUSH) 0.9 %
5-40 SYRINGE (ML) INJECTION PRN
Status: DISCONTINUED | OUTPATIENT
Start: 2022-11-14 | End: 2022-11-21 | Stop reason: HOSPADM

## 2022-11-14 RX ORDER — LIDOCAINE HYDROCHLORIDE 20 MG/ML
INJECTION, SOLUTION INTRAVENOUS PRN
Status: DISCONTINUED | OUTPATIENT
Start: 2022-11-14 | End: 2022-11-14 | Stop reason: SDUPTHER

## 2022-11-14 RX ORDER — SODIUM CHLORIDE 0.9 % (FLUSH) 0.9 %
5-40 SYRINGE (ML) INJECTION PRN
Status: DISCONTINUED | OUTPATIENT
Start: 2022-11-14 | End: 2022-11-18

## 2022-11-14 RX ORDER — SODIUM CHLORIDE 9 MG/ML
INJECTION, SOLUTION INTRAVENOUS PRN
Status: DISCONTINUED | OUTPATIENT
Start: 2022-11-14 | End: 2022-11-18

## 2022-11-14 RX ORDER — MIDAZOLAM HYDROCHLORIDE 1 MG/ML
INJECTION INTRAMUSCULAR; INTRAVENOUS
Status: COMPLETED
Start: 2022-11-14 | End: 2022-11-14

## 2022-11-14 RX ORDER — SODIUM CHLORIDE 0.9 % (FLUSH) 0.9 %
5-40 SYRINGE (ML) INJECTION EVERY 12 HOURS SCHEDULED
Status: DISCONTINUED | OUTPATIENT
Start: 2022-11-14 | End: 2022-11-18

## 2022-11-14 RX ORDER — METOPROLOL TARTRATE 50 MG/1
100 TABLET, FILM COATED ORAL DAILY
Status: DISCONTINUED | OUTPATIENT
Start: 2022-11-15 | End: 2022-11-21 | Stop reason: HOSPADM

## 2022-11-14 RX ORDER — PAPAVERINE HYDROCHLORIDE 30 MG/ML
INJECTION INTRAMUSCULAR; INTRAVENOUS PRN
Status: DISCONTINUED | OUTPATIENT
Start: 2022-11-14 | End: 2022-11-14 | Stop reason: ALTCHOICE

## 2022-11-14 RX ADMIN — SODIUM CHLORIDE 0.5 MCG/KG/HR: 9 INJECTION, SOLUTION INTRAVENOUS at 07:48

## 2022-11-14 RX ADMIN — HYDROMORPHONE HYDROCHLORIDE 0.5 MG: 1 INJECTION, SOLUTION INTRAMUSCULAR; INTRAVENOUS; SUBCUTANEOUS at 23:26

## 2022-11-14 RX ADMIN — PHENYLEPHRINE HYDROCHLORIDE 100 MCG: 10 INJECTION INTRAVENOUS at 09:36

## 2022-11-14 RX ADMIN — ALBUMIN (HUMAN) 25 G: 12.5 INJECTION, SOLUTION INTRAVENOUS at 10:29

## 2022-11-14 RX ADMIN — FENTANYL CITRATE 50 MCG: 50 INJECTION, SOLUTION INTRAMUSCULAR; INTRAVENOUS at 08:00

## 2022-11-14 RX ADMIN — SODIUM CHLORIDE, POTASSIUM CHLORIDE, SODIUM LACTATE AND CALCIUM CHLORIDE: 600; 310; 30; 20 INJECTION, SOLUTION INTRAVENOUS at 18:45

## 2022-11-14 RX ADMIN — FENTANYL CITRATE 50 MCG: 50 INJECTION, SOLUTION INTRAMUSCULAR; INTRAVENOUS at 08:42

## 2022-11-14 RX ADMIN — Medication 30 MG: at 07:35

## 2022-11-14 RX ADMIN — SODIUM CHLORIDE 3000 MG: 9 INJECTION, SOLUTION INTRAVENOUS at 07:40

## 2022-11-14 RX ADMIN — SODIUM CHLORIDE, POTASSIUM CHLORIDE, SODIUM LACTATE AND CALCIUM CHLORIDE: 600; 310; 30; 20 INJECTION, SOLUTION INTRAVENOUS at 11:48

## 2022-11-14 RX ADMIN — SODIUM CHLORIDE 3000 MG: 9 INJECTION, SOLUTION INTRAVENOUS at 11:19

## 2022-11-14 RX ADMIN — SODIUM CHLORIDE 3000 MG: 9 INJECTION, SOLUTION INTRAVENOUS at 15:22

## 2022-11-14 RX ADMIN — DEXAMETHASONE SODIUM PHOSPHATE 10 MG: 10 INJECTION INTRAMUSCULAR; INTRAVENOUS at 07:29

## 2022-11-14 RX ADMIN — PROPOFOL 170 MG: 10 INJECTION, EMULSION INTRAVENOUS at 07:24

## 2022-11-14 RX ADMIN — FENTANYL CITRATE 100 MCG: 50 INJECTION, SOLUTION INTRAMUSCULAR; INTRAVENOUS at 07:24

## 2022-11-14 RX ADMIN — SODIUM CHLORIDE, POTASSIUM CHLORIDE, SODIUM LACTATE AND CALCIUM CHLORIDE: 600; 310; 30; 20 INJECTION, SOLUTION INTRAVENOUS at 19:35

## 2022-11-14 RX ADMIN — SODIUM CHLORIDE, POTASSIUM CHLORIDE, SODIUM LACTATE AND CALCIUM CHLORIDE: 600; 310; 30; 20 INJECTION, SOLUTION INTRAVENOUS at 12:54

## 2022-11-14 RX ADMIN — LIDOCAINE HYDROCHLORIDE 100 MG: 20 INJECTION, SOLUTION INTRAVENOUS at 07:24

## 2022-11-14 RX ADMIN — PROPOFOL 20 MG: 10 INJECTION, EMULSION INTRAVENOUS at 23:00

## 2022-11-14 RX ADMIN — FENTANYL CITRATE 50 MCG: 50 INJECTION, SOLUTION INTRAMUSCULAR; INTRAVENOUS at 08:05

## 2022-11-14 RX ADMIN — ROCURONIUM BROMIDE 50 MG: 10 INJECTION INTRAVENOUS at 07:24

## 2022-11-14 RX ADMIN — ROCURONIUM BROMIDE 50 MG: 10 INJECTION INTRAVENOUS at 08:25

## 2022-11-14 RX ADMIN — MIDAZOLAM 2 MG: 1 INJECTION INTRAMUSCULAR; INTRAVENOUS at 23:49

## 2022-11-14 RX ADMIN — SODIUM CHLORIDE: 9 INJECTION, SOLUTION INTRAVENOUS at 07:13

## 2022-11-14 RX ADMIN — SODIUM CHLORIDE, POTASSIUM CHLORIDE, SODIUM LACTATE AND CALCIUM CHLORIDE: 600; 310; 30; 20 INJECTION, SOLUTION INTRAVENOUS at 23:51

## 2022-11-14 RX ADMIN — SODIUM CHLORIDE 3000 MG: 9 INJECTION, SOLUTION INTRAVENOUS at 19:23

## 2022-11-14 RX ADMIN — FENTANYL CITRATE 50 MCG: 50 INJECTION, SOLUTION INTRAMUSCULAR; INTRAVENOUS at 08:17

## 2022-11-14 RX ADMIN — Medication 20 MG: at 08:04

## 2022-11-14 RX ADMIN — DEXAMETHASONE SODIUM PHOSPHATE 10 MG: 10 INJECTION INTRAMUSCULAR; INTRAVENOUS at 13:20

## 2022-11-14 RX ADMIN — MIDAZOLAM 2 MG: 1 INJECTION INTRAMUSCULAR; INTRAVENOUS at 07:00

## 2022-11-14 RX ADMIN — SODIUM CHLORIDE, POTASSIUM CHLORIDE, SODIUM LACTATE AND CALCIUM CHLORIDE: 600; 310; 30; 20 INJECTION, SOLUTION INTRAVENOUS at 08:59

## 2022-11-14 RX ADMIN — MIDAZOLAM HYDROCHLORIDE 2 MG: 2 INJECTION, SOLUTION INTRAMUSCULAR; INTRAVENOUS at 23:49

## 2022-11-14 RX ADMIN — ALBUMIN (HUMAN) 25 G: 12.5 INJECTION, SOLUTION INTRAVENOUS at 14:00

## 2022-11-14 RX ADMIN — Medication 0.5 MG: at 23:26

## 2022-11-14 RX ADMIN — SODIUM CHLORIDE, POTASSIUM CHLORIDE, SODIUM LACTATE AND CALCIUM CHLORIDE: 600; 310; 30; 20 INJECTION, SOLUTION INTRAVENOUS at 17:13

## 2022-11-14 RX ADMIN — FENTANYL CITRATE 50 MCG: 50 INJECTION, SOLUTION INTRAMUSCULAR; INTRAVENOUS at 08:56

## 2022-11-14 ASSESSMENT — PAIN - FUNCTIONAL ASSESSMENT: PAIN_FUNCTIONAL_ASSESSMENT: NONE - DENIES PAIN

## 2022-11-14 ASSESSMENT — LIFESTYLE VARIABLES: SMOKING_STATUS: 0

## 2022-11-14 NOTE — ANESTHESIA PROCEDURE NOTES
Arterial Line:    An arterial line was placed using surface landmarks, in the pre-op for the following indication(s): continuous blood pressure monitoring and blood sampling needed. A 20 gauge (size), 5 cm (length), Arrow (type) catheter was placed, Seldinger technique used, into the right radial artery, secured by tape and Tegaderm. Anesthesia type: Local  Local infiltration: Injection    Events:  patient tolerated procedure well with no complications and EBL < 5mL. 11/14/2022 7:00 AM11/14/2022 7:10 AM  Anesthesiologist: Carola Eisenmenger, MD  Resident/CRNA: Lena Newton APRN - CRNA  Other anesthesia staff: Diaz Cárdenas RN  Performed:  Other anesthesia staff   Preanesthetic Checklist  Completed: patient identified, IV checked, site marked, risks and benefits discussed, surgical/procedural consents, equipment checked, pre-op evaluation, timeout performed, anesthesia consent given, oxygen available and monitors applied/VS acknowledged

## 2022-11-14 NOTE — ANESTHESIA PRE PROCEDURE
Department of Anesthesiology  Preprocedure Note       Name:  Brad Hernandes   Age:  69 y.o.  :  1955                                          MRN:  [id number]         Date:  2022      Surgeon: Eliz Jiang):  MD Reina Ingram DO    Procedure: Procedure(s): MOUTH LESION BIOPSY EXCISION,NECK DISSECTION RADICAL,FREE FLAP GRAFT NECK DISSECTION-ROBOTIC    Medications prior to admission:   Prior to Admission medications    Medication Sig Start Date End Date Taking? Authorizing Provider   LORazepam (ATIVAN) 1 MG tablet Take 2 tablets by mouth 2 times daily for 5 days. 22  Gary Piedra,    HYDROcodone-acetaminophen (NORCO)  MG per tablet Take 1 tablet by mouth every 6 hours as needed for Pain.   Patient not taking: No sig reported    Historical Provider, MD   Cyanocobalamin (VITAMIN B 12 PO) Take 1 tablet by mouth daily    Historical Provider, MD   metoprolol (LOPRESSOR) 100 MG tablet Take 100 mg by mouth daily    Historical Provider, MD   Polyvinyl Alcohol-Povidone (REFRESH OP) Apply 4 drops to eye in the morning and at bedtime Indications: bilateral eyes    Historical Provider, MD   tetrahydrozoline 0.05 % ophthalmic solution Place 4 drops into both eyes 2 times daily    Historical Provider, MD   acetaminophen (TYLENOL) 500 MG tablet Take 1,000 mg by mouth daily as needed for Pain    Historical Provider, MD   ondansetron (ZOFRAN ODT) 4 MG disintegrating tablet Take 1 tablet by mouth every 8 hours as needed for Nausea or Vomiting 10/13/22   Vertie Alana, DO   aspirin 81 MG EC tablet Take 81 mg by mouth daily    Historical Provider, MD   loratadine (CLARITIN) 10 MG tablet Take 10 mg by mouth daily PRN    Historical Provider, MD   Multiple Vitamins-Minerals (CENTRUM SILVER PO) Take 1 tablet by mouth daily    Historical Provider, MD   Ascorbic Acid (VITAMIN C) 1000 MG tablet Take 1,000 mg by mouth daily    Historical Provider, MD   zinc 50 MG TABS tablet Take 100 mg by mouth 2 times daily    Historical Provider, MD   Cholecalciferol (VITAMIN D3) 50 MCG (2000 UT) CAPS Take 1 capsule by mouth daily    Historical Provider, MD   vitamin B-6 (PYRIDOXINE) 100 MG tablet Take 100 mg by mouth daily    Historical Provider, MD   atorvastatin (LIPITOR) 10 MG tablet Take 10 mg by mouth at bedtime    Historical Provider, MD   omeprazole (PRILOSEC) 20 MG capsule Take 20 mg by mouth daily. Historical Provider, MD       Current medications:    No current facility-administered medications for this visit. No current outpatient medications on file.      Facility-Administered Medications Ordered in Other Visits   Medication Dose Route Frequency Provider Last Rate Last Admin    sodium chloride flush 0.9 % injection 5-40 mL  5-40 mL IntraVENous 2 times per day Jannifer Nim, DO        sodium chloride flush 0.9 % injection 5-40 mL  5-40 mL IntraVENous PRN Jannifer Nim, DO        0.9 % sodium chloride infusion   IntraVENous PRN Jannifer Nim, DO        ampicillin-sulbactam (UNASYN) 3,000 mg in sodium chloride 0.9 % 100 mL IVPB ([id number])  3,000 mg IntraVENous On Call to [phone] Letty Spencer, DO           Allergies:  No Known Allergies    Problem List:    Patient Active Problem List   Diagnosis Code    Tongue mass K14.8    Post-op pain G89.18    Cancer of oral cavity ([facility] [address] C06.9    Malnutrition ([facility] [address] E46    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       Past Medical History:        Diagnosis Date    Acid reflux disease     Cancer of oral cavity ([facility] [address] 10/14/2022    Hyperlipidemia     Hypertension        Past Surgical History:        Procedure Laterality Date    COLONOSCOPY  09/27/2022    CYST REMOVAL      upper mid chest    GASTROSTOMY TUBE PLACEMENT N/A 11/8/2022    EGD PEG TUBE PLACEMENT performed by Todd Hurst MD at [address] [facility] Right 2021    LARYNGOSCOPY N/A 10/13/2022    DIRECT LARYNGOSCOPY, BRONCHOSCOPY, ESOPHAGOSCOPY performed by Jose Hilton MD at [address] 10/04/2022       Social History:    Social History     Tobacco Use    Smoking status: Never     Passive exposure: Past    Smokeless tobacco: Never   Substance Use Topics    Alcohol use: No                                Counseling given: Not Answered      Vital Signs (Current): There were no vitals filed for this visit. BP Readings from Last 3 Encounters:   11/14/22 (!) 172/92   11/08/22 (!) 156/98   11/07/22 (!) 144/86       NPO Status:                                                                                 BMI:   Wt Readings from Last 3 Encounters:   11/14/22 225 lb (102.1 kg)   11/08/22 220 lb (99.8 kg)   11/07/22 225 lb (102.1 kg)     There is no height or weight on file to calculate BMI.    CBC:   Lab Results   Component Value Date/Time    WBC 5.2 11/07/2022 09:35 AM    RBC 4.85 11/07/2022 09:35 AM    HGB 14.2 11/07/2022 09:35 AM    HCT 41.4 11/07/2022 09:35 AM    MCV 85.4 11/07/2022 09:35 AM    RDW 12.5 11/07/2022 09:35 AM     11/07/2022 09:35 AM       CMP:   Lab Results   Component Value Date/Time    BUN 16 10/05/2022 02:14 PM    CREATININE 1.1 10/05/2022 02:14 PM    GFRAA >60 10/05/2022 02:14 PM    LABGLOM >60 10/05/2022 02:14 PM       POC Tests: No results for input(s): POCGLU, POCNA, POCK, POCCL, POCBUN, POCHEMO, POCHCT in the last 72 hours.     Coags: No results found for: PROTIME, INR, APTT    HCG (If Applicable): No results found for: PREGTESTUR, PREGSERUM, HCG, HCGQUANT     ABGs: No results found for: PHART, PO2ART, QKK0MUQ, WFA4YSQ, BEART, Y8WMDFCQ     Type & Screen (If Applicable):  No results found for: LABABO, LABRH    Drug/Infectious Status (If Applicable):  No results found for: HIV, HEPCAB    COVID-19 Screening (If Applicable): No results found for: COVID19        Anesthesia Evaluation  Patient summary reviewed and Nursing notes reviewed no history of anesthetic complications:   Airway: Mallampati: III  TM distance: >3 FB   Neck ROM: full  Mouth opening: > = 3 FB   Dental:          Pulmonary:Negative Pulmonary ROS breath sounds clear to auscultation      (-) not a current smoker                           Cardiovascular:  Exercise tolerance: good (>4 METS),   (+) hypertension:, hyperlipidemia        Rhythm: regular  Rate: normal                    Neuro/Psych:   Negative Neuro/Psych ROS              GI/Hepatic/Renal:   (+) GERD:,           Endo/Other:    (+) malignancy/cancer (oral). ROS comment: obese Abdominal:             Vascular: negative vascular ROS. Other Findings:             Anesthesia Plan      general     ASA 3       Induction: intravenous. arterial line  MIPS: Postoperative opioids intended and Prophylactic antiemetics administered. Anesthetic plan and risks discussed with patient. Plan discussed with CRNA.                     Jamee Duran MD   11/14/2022

## 2022-11-14 NOTE — PROGRESS NOTES
INTRAOPERATIVE CONSULTATION (with FROZEN SECTION)     Lateral Tongue - circumferential  mucosal margins - Negative for carcinoma    INTRAOPERATIVE CONSULTATION (with FROZEN SECTION)     Hyoid deep margin - Negative for carcinoma

## 2022-11-14 NOTE — PROGRESS NOTES
DEPARTMENT OF OTOLARYNGOLOGY - HEAD & NECK SURGERY   PROGRESS NOTE    PATIENT: Ronan Banks ADMIT DATE:11/14/2022   ROOM/BED: OR POOL /NONE TODAY:November 14, 2022     SUBJECTIVE:    No acute event overnight  Alert and oriented  Slightly agitated overnight was started on minimal Precedex  Pain is well controlled  Denies n/v/f/c    Patient's past medical and surgical history, medications, allergies, labs and imagings were reviewed. OBJECTIVE:                                                                                                                              Physical Exam  Constitutional: Appears awake, alert, cooperative, no apparent distress   Eyes: Lids and lashes normal, pupils equal, round and reactive to light,extra ocular muscles intact, sclera clear, conjunctiva normal   HENT: Doppler sounds with arterial waveform at marked site. Flap appears pink without any increased swelling or signs of venous congestion. Soft, warm to touch with normal capillary refills. Incisions are clean, moist and intact. Neck:  Supple, soft and flat without evidence of hematoma or fluid collection. Incisions clean, moist and intact. Shiley 6-0 cuffed trach midline and intact. Mild bloody secretions    Extremities Flap donor extremity warm with normal capillary refills, strong distal pulses, intact sensation and movement. No cyanosis or edema. Skin graft donor site with tegaderm/Suresite in place   Cardiovascular: Regular rate   Neurologic:  Cranial nerves II-XII are grossly intact. Drains: All drains holding suction with serosanguinous output (stripped)  Drain (L lateral neck): 40 cc/24h  Drain (L medial neck): 70 cc/24h  Drain (L arm): 20 cc/24h     Output by Drain (mL) 11/12/22 0701 - 11/12/22 1900 11/12/22 1901 - 11/13/22 0700 11/13/22 0701 - 11/13/22 1900 11/13/22 1901 - 11/14/22 0700 11/14/22 0701 - 11/14/22 1839   Requested LDAs do not have output data documented.           Vitals:    10/31/22 1439 11/14/22 0536   BP:  (!) 172/92   Pulse:  84   Resp:  20   Temp:  97.7 °F (36.5 °C)   TempSrc:  Temporal   SpO2:  97%   Weight: 225 lb (102.1 kg) 225 lb (102.1 kg)   Height: 5' 10\" (1.778 m) 5' 10\" (1.778 m)     24 HR INTAKE/OUTPUT:    Intake/Output Summary (Last 24 hours) at 11/14/2022 1839  Last data filed at 11/14/2022 1757  Gross per 24 hour   Intake 5100 ml   Output 1100 ml   Net 4000 ml     8HR INTAKE OUTPUT:  In: 5100 [I.V.:5000]  Out: 1100 [Urine:1100]    Recent Results (from the past 24 hour(s))   Arterial Blood Gas, Respiratory Only    Collection Time: 11/14/22 11:23 AM   Result Value Ref Range    POC Source Arterial     FIO2 40.0     Pt Temp 37.0     PH (TEMPERATURE CORRECTED) 7.391 7.350 - 7.450    PCO2 (TEMP CORRECTED) 38.2 35.0 - 45.0 mmHg    PO2 (TEMP CORRECTED) 111.5 (H) 60.0 - 80.0 mmHg    HCO3 23.2 22.0 - 26.0 mmol/L    B.E. -1.5 -3.0 - 3.0 mmol/L    O2 Sat 98.3 92.0 - 98.5 %    Hemoglobin 11.2 (L) 12.5 - 15.5 g/dL    Hematocrit 33.0 (L) 37.0 - 54.0 %    Cardiopulmonary Bypass No      ,998     DEVICE 14,347,[id number]     POC Sodium 141.0 132.0 - 146.0 mmol/L    Potassium 4.3 3.5 - 5.5 mmol/L    POC Chloride 107.0 100.0 - 108.0 mmol/L    POC CO2 22.4 22.0 - 29.0 mmol/L    POC Glucose 154.0 (H) 74.0 - 99.0 mg/dL    POC BUN 14.0 8.0 - 23.0 mg/dL    POC Creatinine 0.8 0.7 - 1.2 mg/dL    POC Ionized Calcium 1.2 1.1 - 1.3    POC Lactic Acid 1.6 0.5 - 2.2    GFR, Estimated >60 >=60 mL/min/1.73    Anion Gap 12 7 - 16 mmol/L   Arterial Blood Gas, Respiratory Only    Collection Time: 11/14/22  1:55 PM   Result Value Ref Range    POC Source Arterial     PH 37 7.391 7.350 - 7.450    PCO2 37 39.1 35.0 - 45.0 mmHg    PO2 37 83.4 (H) 60.0 - 80.0 mmHg    HCO3 23.7 22.0 - 26.0 mmol/L    B.E. -1.1 -3.0 - 3.0 mmol/L    O2 Sat 96.1 92.0 - 98.5 %    Hemoglobin 11.3 (L) 12.5 - 15.5 g/dL    Hematocrit 33.0 (L) 37.0 - 54.0 %    Cardiopulmonary Bypass No      ,385     DEVICE 14,347,521,402,228     POC Sodium 141.0 132.0 - 146.0 mmol/L    Potassium 4.4 3.5 - 5.5 mmol/L    POC Chloride 105.0 100.0 - 108.0 mmol/L    POC CO2 22.9 22.0 - 29.0 mmol/L    POC Glucose 151.0 (H) 74.0 - 99.0 mg/dL    POC BUN 13.0 8.0 - 23.0 mg/dL    POC Creatinine 0.8 0.7 - 1.2 mg/dL    POC Ionized Calcium 1.2 1.1 - 1.3    POC Lactic Acid 2.2 0.5 - 2.2    GFR, Estimated >60 >=60 mL/min/1.73    Anion Gap 13 7 - 16 mmol/L       Problem List Items Addressed This Visit          Digestive    Cancer of oral cavity [facility])    Relevant Orders    Surgical Pathology    Surgical Pathology    Surgical Pathology    Surgical Pathology    Surgical Pathology    Surgical Pathology       Other    Tongue mass    Relevant Orders    Surgical Pathology    Surgical Pathology    Surgical Pathology    Surgical Pathology    Surgical Pathology    Surgical Pathology     Other Visit Diagnoses       Pre-operative laboratory examination    -  Primary    Relevant Orders    CBC (Completed)    Type and Screen (Completed)    EKG 12 Lead    Culture, MRSA, Screening (Completed)               ASSESSMENT:  69 y.o.male with history of oral cavity SCC s/p left hemiglossectomy, left neck dissection and left ulnar forearm free flap reconstruction with Finesse Moreira and Tonie Alfred on 11/14/22. PLAN:   Flap: Continue Q1H flap checks with doppler per nursing. Flap donor extremity must be elevated at all times. Please call on-call ENT resident for any change in color in flap color, doppler sounds, or increased swelling/firmness at the related flap or donor site. Avoid intraoral suctioning or manipulating of the tongue. CV: Discontinue arterial line POD1. Maintain systolic blood pressure between 100-160. Keep MAP greater than 70. Resp: Wean O2 as able. Humidified air at all times. Trach suctions Q2H to prevent mucus plug and per protocol. Maintain cuff inflation for 2 days postoperatively. Analgesia: Scheduled Tylenol.  PCA, transition to Roxicodone through the NGT/PEG as tolerated  GI: PPI (Peptamen if fish allergy), bowel regimen, dietary consult, POD#1 (Wait 24 hrs) begin trickle feeds If bowel sounds present prefer 2cal HN at 10 cc/hr for 24 hrs, increase rate by 5-10ml/hour Q6hrs as tolerated to protect suture lines from reflux   Advancing tube feed protocol: check residual Q4H. If >300cc, stop TF for 1-4h and recheck residual  If 200cc-300, half the current rate  If 100cc-200, maintain current rate and recheck residual Q4H  If <100cc, advance TF as above  : Maintain nettles, discontinue POD2  ID: Continue Unasyn Q6H while drains are in place; continue Decadron 8 mg Q8H 3 days postoperatively followed by taper. Drains: Monitor output, stripping with flap checks  Embolic PPX: SCDs while in bed & SubQ Heparin 5000 TID for DVT prophylaxis and assist flap perfusion    PT/OT: encourage out of bed to chair, ambulate as tolerated  Medical management per medicine/SICU, [facility] care  Speech therapy, social work, [place] [id number], consults ordered  Dispo: Continue SICU care for least 48 hours post operatively. Patient seen and examined. Plans discussed with attending physician.       Zaida Etienne DO  Resident Physician  Otolaryngology  Melita Alvarez 1945 11/14/2022  6:39 PM

## 2022-11-14 NOTE — PROGRESS NOTES
INTRAOPERATIVE CONSULTATION (with FROZEN SECTION)   Lesser cornu hyoid margin - positive for invasive squamous cell carcinoma

## 2022-11-14 NOTE — H&P
H&P reviewed, no changes. No issues. Questions and concerns were answered to the patient's satisfaction.  Will proceed with procedure    Will discuss with attending     Electronically signed by Oriana Marx DO on 11/14/22 at 6:32 AM EST

## 2022-11-15 PROBLEM — R13.12 OROPHARYNGEAL DYSPHAGIA: Status: ACTIVE | Noted: 2022-11-15

## 2022-11-15 PROBLEM — E83.42 HYPOMAGNESEMIA: Status: ACTIVE | Noted: 2022-11-15

## 2022-11-15 PROBLEM — E83.39 HYPOPHOSPHATASIA: Status: ACTIVE | Noted: 2022-11-15

## 2022-11-15 LAB
ALBUMIN SERPL-MCNC: 3.5 G/DL (ref 3.5–5.2)
ALP BLD-CCNC: 49 U/L (ref 40–129)
ALT SERPL-CCNC: 19 U/L (ref 0–40)
ANION GAP SERPL CALCULATED.3IONS-SCNC: 12 MMOL/L (ref 7–16)
ANION GAP: 13 MMOL/L (ref 7–16)
ANION GAP: 14 MMOL/L (ref 7–16)
AST SERPL-CCNC: 25 U/L (ref 0–39)
B.E.: -1.8 MMOL/L (ref -3–3)
B.E.: -2.5 MMOL/L (ref -3–3)
BASOPHILS ABSOLUTE: 0.01 E9/L (ref 0–0.2)
BASOPHILS RELATIVE PERCENT: 0.1 % (ref 0–2)
BILIRUB SERPL-MCNC: 0.7 MG/DL (ref 0–1.2)
BUN BLDV-MCNC: 16 MG/DL (ref 6–23)
CALCIUM IONIZED: 1.21 MMOL/L (ref 1.15–1.33)
CALCIUM SERPL-MCNC: 8.6 MG/DL (ref 8.6–10.2)
CARDIOPULMONARY BYPASS: NO
CARDIOPULMONARY BYPASS: NO
CHLORIDE BLD-SCNC: 109 MMOL/L (ref 98–107)
CO2: 22 MMOL/L (ref 22–29)
CREAT SERPL-MCNC: 0.8 MG/DL (ref 0.7–1.2)
DEVICE: ABNORMAL
DEVICE: ABNORMAL
EOSINOPHILS ABSOLUTE: 0 E9/L (ref 0.05–0.5)
EOSINOPHILS RELATIVE PERCENT: 0 % (ref 0–6)
FIO2: 30
FIO2: 30
GFR SERPL CREATININE-BSD FRML MDRD: >60 ML/MIN/1.73
GFR, ESTIMATED: >60 ML/MIN/1.73
GFR, ESTIMATED: >60 ML/MIN/1.73
GLUCOSE BLD-MCNC: 161 MG/DL (ref 74–99)
GLUCOSE BLD-MCNC: 166 MG/DL (ref 74–99)
GLUCOSE BLD-MCNC: 169 MG/DL (ref 74–99)
HCO3: 22.1 MMOL/L (ref 22–26)
HCO3: 22.6 MMOL/L (ref 22–26)
HCT VFR BLD CALC: 31 % (ref 37–54)
HEMATOCRIT: 31 % (ref 37–54)
HEMATOCRIT: 32 % (ref 37–54)
HEMOGLOBIN: 10.6 G/DL (ref 12.5–15.5)
HEMOGLOBIN: 10.8 G/DL (ref 12.5–15.5)
HEMOGLOBIN: 10.8 G/DL (ref 12.5–16.5)
IMMATURE GRANULOCYTES #: 0.06 E9/L
IMMATURE GRANULOCYTES %: 0.5 % (ref 0–5)
LYMPHOCYTES ABSOLUTE: 0.4 E9/L (ref 1.5–4)
LYMPHOCYTES RELATIVE PERCENT: 3.3 % (ref 20–42)
MAGNESIUM: 1.4 MG/DL (ref 1.6–2.6)
MCH RBC QN AUTO: 29.2 PG (ref 26–35)
MCHC RBC AUTO-ENTMCNC: 34.8 % (ref 32–34.5)
MCV RBC AUTO: 83.8 FL (ref 80–99.9)
MONOCYTES ABSOLUTE: 0.42 E9/L (ref 0.1–0.95)
MONOCYTES RELATIVE PERCENT: 3.5 % (ref 2–12)
NEUTROPHILS ABSOLUTE: 11.09 E9/L (ref 1.8–7.3)
NEUTROPHILS RELATIVE PERCENT: 92.6 % (ref 43–80)
O2 SATURATION: 94.9 % (ref 92–98.5)
O2 SATURATION: 96 % (ref 92–98.5)
OPERATOR ID: 4510
OPERATOR ID: ABNORMAL
OVALOCYTES: ABNORMAL
PCO2 37: 36.2 MMHG (ref 35–45)
PCO2 37: 36.7 MMHG (ref 35–45)
PDW BLD-RTO: 12.2 FL (ref 11.5–15)
PH 37: 7.39 (ref 7.35–7.45)
PH 37: 7.4 (ref 7.35–7.45)
PHOSPHORUS: 2.3 MG/DL (ref 2.5–4.5)
PLATELET # BLD: 173 E9/L (ref 130–450)
PMV BLD AUTO: 10.4 FL (ref 7–12)
PO2 37: 75.2 MMHG (ref 60–80)
PO2 37: 80.6 MMHG (ref 60–80)
POC BUN: 13 MG/DL (ref 8–23)
POC BUN: 14 MG/DL (ref 8–23)
POC CHLORIDE: 105 MMOL/L (ref 100–108)
POC CHLORIDE: 107 MMOL/L (ref 100–108)
POC CO2: 21.3 MMOL/L (ref 22–29)
POC CO2: 21.7 MMOL/L (ref 22–29)
POC CREATININE: 0.6 MG/DL (ref 0.7–1.2)
POC CREATININE: 0.7 MG/DL (ref 0.7–1.2)
POC IONIZED CALCIUM: 1.1 (ref 1.1–1.3)
POC IONIZED CALCIUM: 1.2 (ref 1.1–1.3)
POC LACTIC ACID: 2.3 (ref 0.5–2.2)
POC LACTIC ACID: 2.4 (ref 0.5–2.2)
POC SODIUM: 140 MMOL/L (ref 132–146)
POC SODIUM: 142 MMOL/L (ref 132–146)
POC SOURCE: ABNORMAL
POC SOURCE: ABNORMAL
POIKILOCYTES: ABNORMAL
POTASSIUM SERPL-SCNC: 4.1 MMOL/L (ref 3.5–5)
POTASSIUM SERPL-SCNC: 4.1 MMOL/L (ref 3.5–5.5)
POTASSIUM SERPL-SCNC: 4.2 MMOL/L (ref 3.5–5.5)
RBC # BLD: 3.7 E12/L (ref 3.8–5.8)
SODIUM BLD-SCNC: 143 MMOL/L (ref 132–146)
TOTAL PROTEIN: 5.4 G/DL (ref 6.4–8.3)
WBC # BLD: 12 E9/L (ref 4.5–11.5)

## 2022-11-15 PROCEDURE — 6360000002 HC RX W HCPCS

## 2022-11-15 PROCEDURE — 87081 CULTURE SCREEN ONLY: CPT

## 2022-11-15 PROCEDURE — 6370000000 HC RX 637 (ALT 250 FOR IP): Performed by: OTOLARYNGOLOGY

## 2022-11-15 PROCEDURE — 2700000000 HC OXYGEN THERAPY PER DAY

## 2022-11-15 PROCEDURE — 6360000002 HC RX W HCPCS: Performed by: STUDENT IN AN ORGANIZED HEALTH CARE EDUCATION/TRAINING PROGRAM

## 2022-11-15 PROCEDURE — 84100 ASSAY OF PHOSPHORUS: CPT

## 2022-11-15 PROCEDURE — 36415 COLL VENOUS BLD VENIPUNCTURE: CPT

## 2022-11-15 PROCEDURE — 80053 COMPREHEN METABOLIC PANEL: CPT

## 2022-11-15 PROCEDURE — 85025 COMPLETE CBC W/AUTO DIFF WBC: CPT

## 2022-11-15 PROCEDURE — 83735 ASSAY OF MAGNESIUM: CPT

## 2022-11-15 PROCEDURE — 97162 PT EVAL MOD COMPLEX 30 MIN: CPT

## 2022-11-15 PROCEDURE — 2500000003 HC RX 250 WO HCPCS: Performed by: STUDENT IN AN ORGANIZED HEALTH CARE EDUCATION/TRAINING PROGRAM

## 2022-11-15 PROCEDURE — 6370000000 HC RX 637 (ALT 250 FOR IP)

## 2022-11-15 PROCEDURE — 97166 OT EVAL MOD COMPLEX 45 MIN: CPT

## 2022-11-15 PROCEDURE — 2580000003 HC RX 258: Performed by: ANESTHESIOLOGY

## 2022-11-15 PROCEDURE — 99024 POSTOP FOLLOW-UP VISIT: CPT | Performed by: SURGERY

## 2022-11-15 PROCEDURE — 6360000002 HC RX W HCPCS: Performed by: OTOLARYNGOLOGY

## 2022-11-15 PROCEDURE — 2580000003 HC RX 258: Performed by: OTOLARYNGOLOGY

## 2022-11-15 PROCEDURE — 2000000000 HC ICU R&B

## 2022-11-15 PROCEDURE — 2580000003 HC RX 258: Performed by: STUDENT IN AN ORGANIZED HEALTH CARE EDUCATION/TRAINING PROGRAM

## 2022-11-15 PROCEDURE — 6370000000 HC RX 637 (ALT 250 FOR IP): Performed by: SURGERY

## 2022-11-15 PROCEDURE — 37799 UNLISTED PX VASCULAR SURGERY: CPT

## 2022-11-15 PROCEDURE — 97535 SELF CARE MNGMENT TRAINING: CPT

## 2022-11-15 PROCEDURE — 82330 ASSAY OF CALCIUM: CPT

## 2022-11-15 PROCEDURE — 97530 THERAPEUTIC ACTIVITIES: CPT

## 2022-11-15 RX ORDER — FENTANYL CITRATE 50 UG/ML
50 INJECTION, SOLUTION INTRAMUSCULAR; INTRAVENOUS
Status: DISCONTINUED | OUTPATIENT
Start: 2022-11-15 | End: 2022-11-19

## 2022-11-15 RX ORDER — OXYCODONE HCL 5 MG/5 ML
5 SOLUTION, ORAL ORAL EVERY 4 HOURS PRN
Status: DISCONTINUED | OUTPATIENT
Start: 2022-11-15 | End: 2022-11-18

## 2022-11-15 RX ORDER — FENTANYL CITRATE 50 UG/ML
100 INJECTION, SOLUTION INTRAMUSCULAR; INTRAVENOUS
Status: DISCONTINUED | OUTPATIENT
Start: 2022-11-15 | End: 2022-11-15

## 2022-11-15 RX ORDER — ATORVASTATIN CALCIUM 10 MG/1
10 TABLET, FILM COATED ORAL NIGHTLY
Status: DISCONTINUED | OUTPATIENT
Start: 2022-11-15 | End: 2022-11-21 | Stop reason: HOSPADM

## 2022-11-15 RX ORDER — ATORVASTATIN CALCIUM 20 MG/1
10 TABLET, FILM COATED ORAL NIGHTLY
Status: DISCONTINUED | OUTPATIENT
Start: 2022-11-15 | End: 2022-11-15

## 2022-11-15 RX ORDER — LORAZEPAM 1 MG/1
1 TABLET ORAL EVERY 6 HOURS PRN
Status: DISCONTINUED | OUTPATIENT
Start: 2022-11-15 | End: 2022-11-16

## 2022-11-15 RX ORDER — MAGNESIUM SULFATE IN WATER 40 MG/ML
2000 INJECTION, SOLUTION INTRAVENOUS ONCE
Status: COMPLETED | OUTPATIENT
Start: 2022-11-15 | End: 2022-11-15

## 2022-11-15 RX ORDER — LORAZEPAM 1 MG/1
1 TABLET ORAL EVERY 4 HOURS PRN
Status: DISCONTINUED | OUTPATIENT
Start: 2022-11-15 | End: 2022-11-15

## 2022-11-15 RX ORDER — MECOBALAMIN 5000 MCG
5 TABLET,DISINTEGRATING ORAL NIGHTLY PRN
Status: DISCONTINUED | OUTPATIENT
Start: 2022-11-15 | End: 2022-11-16

## 2022-11-15 RX ORDER — PANTOPRAZOLE SODIUM 40 MG/1
40 TABLET, DELAYED RELEASE ORAL
Status: DISCONTINUED | OUTPATIENT
Start: 2022-11-15 | End: 2022-11-15

## 2022-11-15 RX ORDER — MIDAZOLAM HYDROCHLORIDE 2 MG/2ML
2 INJECTION, SOLUTION INTRAMUSCULAR; INTRAVENOUS PRN
Status: COMPLETED | OUTPATIENT
Start: 2022-11-15 | End: 2022-11-15

## 2022-11-15 RX ADMIN — BACITRACIN ZINC: 500 OINTMENT TOPICAL at 08:06

## 2022-11-15 RX ADMIN — SODIUM CHLORIDE, PRESERVATIVE FREE 10 ML: 5 INJECTION INTRAVENOUS at 21:06

## 2022-11-15 RX ADMIN — HEPARIN SODIUM 5000 UNITS: 10000 INJECTION INTRAVENOUS; SUBCUTANEOUS at 00:22

## 2022-11-15 RX ADMIN — TETRAHYDROZOLINE HCL 4 DROP: 0.05 SOLUTION/ DROPS OPHTHALMIC at 21:06

## 2022-11-15 RX ADMIN — DEXAMETHASONE SODIUM PHOSPHATE 10 MG: 10 INJECTION INTRAMUSCULAR; INTRAVENOUS at 00:18

## 2022-11-15 RX ADMIN — METOPROLOL TARTRATE 100 MG: 50 TABLET, FILM COATED ORAL at 08:04

## 2022-11-15 RX ADMIN — Medication 500 MG: at 13:16

## 2022-11-15 RX ADMIN — BACITRACIN ZINC: 500 OINTMENT TOPICAL at 14:51

## 2022-11-15 RX ADMIN — AMPICILLIN SODIUM AND SULBACTAM SODIUM 3000 MG: 2; 1 INJECTION, POWDER, FOR SOLUTION INTRAMUSCULAR; INTRAVENOUS at 11:27

## 2022-11-15 RX ADMIN — HEPARIN SODIUM 5000 UNITS: 10000 INJECTION INTRAVENOUS; SUBCUTANEOUS at 05:25

## 2022-11-15 RX ADMIN — TETRAHYDROZOLINE HCL 4 DROP: 0.05 SOLUTION/ DROPS OPHTHALMIC at 00:21

## 2022-11-15 RX ADMIN — BACITRACIN ZINC: 500 OINTMENT TOPICAL at 23:16

## 2022-11-15 RX ADMIN — SODIUM CHLORIDE, POTASSIUM CHLORIDE, SODIUM LACTATE AND CALCIUM CHLORIDE: 600; 310; 30; 20 INJECTION, SOLUTION INTRAVENOUS at 08:22

## 2022-11-15 RX ADMIN — ACETAMINOPHEN 650 MG: 160 SOLUTION ORAL at 05:25

## 2022-11-15 RX ADMIN — Medication 500 MG: at 17:27

## 2022-11-15 RX ADMIN — ACETAMINOPHEN 650 MG: 160 SOLUTION ORAL at 23:58

## 2022-11-15 RX ADMIN — HEPARIN SODIUM 5000 UNITS: 10000 INJECTION INTRAVENOUS; SUBCUTANEOUS at 13:22

## 2022-11-15 RX ADMIN — SODIUM CHLORIDE, POTASSIUM CHLORIDE, SODIUM LACTATE AND CALCIUM CHLORIDE: 600; 310; 30; 20 INJECTION, SOLUTION INTRAVENOUS at 17:35

## 2022-11-15 RX ADMIN — Medication 500 MG: at 21:05

## 2022-11-15 RX ADMIN — TETRAHYDROZOLINE HCL 4 DROP: 0.05 SOLUTION/ DROPS OPHTHALMIC at 08:04

## 2022-11-15 RX ADMIN — FENTANYL CITRATE 50 MCG: 50 INJECTION, SOLUTION INTRAMUSCULAR; INTRAVENOUS at 00:14

## 2022-11-15 RX ADMIN — FENTANYL CITRATE 100 MCG: 50 INJECTION INTRAMUSCULAR; INTRAVENOUS at 03:32

## 2022-11-15 RX ADMIN — SODIUM CHLORIDE, PRESERVATIVE FREE 10 ML: 5 INJECTION INTRAVENOUS at 08:05

## 2022-11-15 RX ADMIN — OXYCODONE HYDROCHLORIDE 5 MG: 5 SOLUTION ORAL at 15:02

## 2022-11-15 RX ADMIN — LORAZEPAM 1 MG: 1 TABLET ORAL at 09:18

## 2022-11-15 RX ADMIN — AMPICILLIN SODIUM AND SULBACTAM SODIUM 3000 MG: 2; 1 INJECTION, POWDER, FOR SOLUTION INTRAMUSCULAR; INTRAVENOUS at 00:16

## 2022-11-15 RX ADMIN — HEPARIN SODIUM 5000 UNITS: 10000 INJECTION INTRAVENOUS; SUBCUTANEOUS at 22:08

## 2022-11-15 RX ADMIN — FENTANYL CITRATE 100 MCG: 50 INJECTION INTRAMUSCULAR; INTRAVENOUS at 00:00

## 2022-11-15 RX ADMIN — LANSOPRAZOLE 30 MG: KIT at 06:51

## 2022-11-15 RX ADMIN — ATORVASTATIN CALCIUM 10 MG: 20 TABLET, FILM COATED ORAL at 21:05

## 2022-11-15 RX ADMIN — BACITRACIN ZINC: 500 OINTMENT TOPICAL at 00:17

## 2022-11-15 RX ADMIN — AMPICILLIN SODIUM AND SULBACTAM SODIUM 3000 MG: 2; 1 INJECTION, POWDER, FOR SOLUTION INTRAMUSCULAR; INTRAVENOUS at 17:41

## 2022-11-15 RX ADMIN — OXYCODONE HYDROCHLORIDE 5 MG: 5 SOLUTION ORAL at 21:05

## 2022-11-15 RX ADMIN — MAGNESIUM SULFATE HEPTAHYDRATE 2000 MG: 40 INJECTION, SOLUTION INTRAVENOUS at 08:10

## 2022-11-15 RX ADMIN — AMPICILLIN SODIUM AND SULBACTAM SODIUM 3000 MG: 2; 1 INJECTION, POWDER, FOR SOLUTION INTRAMUSCULAR; INTRAVENOUS at 05:27

## 2022-11-15 RX ADMIN — Medication 5 MG: at 21:05

## 2022-11-15 RX ADMIN — ACETAMINOPHEN 650 MG: 160 SOLUTION ORAL at 17:28

## 2022-11-15 RX ADMIN — MIDAZOLAM 2 MG: 1 INJECTION INTRAMUSCULAR; INTRAVENOUS at 01:29

## 2022-11-15 RX ADMIN — Medication 500 MG: at 08:04

## 2022-11-15 RX ADMIN — LORAZEPAM 1 MG: 1 TABLET ORAL at 21:05

## 2022-11-15 RX ADMIN — ACETAMINOPHEN 650 MG: 160 SOLUTION ORAL at 00:18

## 2022-11-15 RX ADMIN — ACETAMINOPHEN 650 MG: 160 SOLUTION ORAL at 11:21

## 2022-11-15 RX ADMIN — DEXAMETHASONE SODIUM PHOSPHATE 10 MG: 10 INJECTION INTRAMUSCULAR; INTRAVENOUS at 14:51

## 2022-11-15 RX ADMIN — DEXAMETHASONE SODIUM PHOSPHATE 10 MG: 10 INJECTION INTRAMUSCULAR; INTRAVENOUS at 08:05

## 2022-11-15 RX ADMIN — SODIUM CHLORIDE 0.6 MCG/KG/HR: 9 INJECTION, SOLUTION INTRAVENOUS at 02:39

## 2022-11-15 RX ADMIN — LORAZEPAM 1 MG: 1 TABLET ORAL at 14:59

## 2022-11-15 RX ADMIN — SODIUM CHLORIDE, PRESERVATIVE FREE 10 ML: 5 INJECTION INTRAVENOUS at 21:05

## 2022-11-15 RX ADMIN — FENTANYL CITRATE 50 MCG: 50 INJECTION INTRAMUSCULAR; INTRAVENOUS at 00:14

## 2022-11-15 ASSESSMENT — PAIN DESCRIPTION - DESCRIPTORS: DESCRIPTORS: ACHING

## 2022-11-15 ASSESSMENT — PAIN SCALES - GENERAL
PAINLEVEL_OUTOF10: 0
PAINLEVEL_OUTOF10: 1
PAINLEVEL_OUTOF10: 0
PAINLEVEL_OUTOF10: 1
PAINLEVEL_OUTOF10: 0
PAINLEVEL_OUTOF10: 1
PAINLEVEL_OUTOF10: 1
PAINLEVEL_OUTOF10: 7
PAINLEVEL_OUTOF10: 6

## 2022-11-15 ASSESSMENT — PAIN DESCRIPTION - LOCATION: LOCATION: ARM

## 2022-11-15 ASSESSMENT — PAIN DESCRIPTION - FREQUENCY: FREQUENCY: INTERMITTENT

## 2022-11-15 ASSESSMENT — PAIN DESCRIPTION - PAIN TYPE: TYPE: ACUTE PAIN

## 2022-11-15 ASSESSMENT — PAIN DESCRIPTION - ORIENTATION: ORIENTATION: LEFT

## 2022-11-15 ASSESSMENT — PAIN - FUNCTIONAL ASSESSMENT: PAIN_FUNCTIONAL_ASSESSMENT: ACTIVITIES ARE NOT PREVENTED

## 2022-11-15 NOTE — ANESTHESIA POSTPROCEDURE EVALUATION
Department of Anesthesiology  Postprocedure Note    Patient: Ludy Osei  MRN: [id number]  YOB: 1955  Date of evaluation: 11/15/2022      Procedure Summary     Date: 11/14/22 [facility] / [facility] / [facility]    Anesthesia Start: 0700 Anesthesia Stop: 8456    Procedure: MOUTH LESION BIOPSY EXCISION, ELISHA GLOSSECTOMY, NECK DISSECTION RADICAL, FREE FLAP GRAFT NECK DISSECTION-ROBOTIC, SKIN GRAFT LEFT THIGH, TRACH  TUBE PLACMENT (Face) Diagnosis:       Tongue mass      Cancer of oral cavity (HCC)      (Tongue mass [K14.8])      (Cancer of oral cavity ([facility] [address] [C06.9])    Surgeons: John Yao MD Responsible Provider: Nitza Davis MD    Anesthesia Type: general ASA Status: 3          Anesthesia Type: No value filed.     Abby Phase I: Abby Score: 7    Abby Phase II:        Anesthesia Post Evaluation    Patient location during evaluation: ICU  Patient participation: complete - patient cannot participate  Level of consciousness: sedated and ventilated  Pain score: 0  Airway patency: patent  Nausea & Vomiting: no nausea  Complications: no  Cardiovascular status: hemodynamically stable  Respiratory status: ventilator  Hydration status: stable

## 2022-11-15 NOTE — PROGRESS NOTES
SURGICAL INTENSIVE CARE  PROGRESS NOTE    Date of admission:  11/14/2022  Reason for ICU transfer:  critical care after ulnar forearm free flap    SUBJECTIVE:  No acute events overnight. On 10L via trach mask. Following commands and nods yes/no to questions. Anxiety and agitation overnight. Started on Precedex drip at 0.7. 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 Had PEG tube placed last week. Flap checks q 30 min. 11/15/22 - Agitation is improved during the day. Start trickle tube feeds. PRN Ativan. Wean Precedex. PHYSICAL EXAM:  BP (!) 148/75   Pulse 78   Temp 98.6 °F (37 °C) (Axillary)   Resp 16   Ht 5' 10\" (1.778 m)   Wt 234 lb 4.8 oz (106.3 kg)   SpO2 98%   BMI 33.62 kg/m²     GENERAL:  NAD. Nods yes/no to questions, follows commands  HENT:  Normocephalic, atraumatic. Doppler with arterial waveform. Flap is pink without increased swelling or signs of venous congestion. Normal capillary refill. Incision sites intact. NECK: Incisions clean, moist, intact. 6.0 cuffed Shiley trach midline, intact with minimal bloody secretions. EYES:   No scleral icterus. PERRLA. LUNGS:  No increased work of breathing. CTAB. CARDIOVASCULAR: Warm throughout. Regular rate  ABDOMEN:  Soft, non distended, non tender. No guarding, rigidity, rebound. EXTREMITIES:   Flap donor site with splint in place to left forearm. Motor, capillary refill and sensation intact distally. Suresite in place over left thigh STSG donor site. MAEx4. Atraumatic. No LE edema. SKIN:  Warm and dry  NEUROLOGIC:  GCS 15    All drains holding suction with SS output.        ASSESSMENT / PLAN:  69 y.o. 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 Fentanyl and oxycodone PRN. Scheduled tylenol. Ativan PRN. Precedex drip for agitation overnight. Wean as tolerated. CV: Discontinue arterial line POD1. Maintain systolic blood pressure between 100-160. Keep MAP greater than 70. Pulm: Wean O2 as able. Humidified air at all times. Trach suction Q2H to prevent mucus plug and per protocol. Maintain cuff inflation for 2 days postoperatively. No tracheostomy ties  GI: Start trickle tube feeds today. PPI. Bowel regimen. Dietary consult. Zofran PRN. Renal: No acute issues. Monitor UOP & Electrolytes. Endocrine: Decadron 8 mg Q8H 3 days postoperatively followed by taper. Monitor glucose. MSK: S/p left hemiglossectomy with left ulnar forearm free flap reconstruction. Check flap appearance and doppler signals q1hr. Call ENT resident if changes appear   Heme: SCDs while in bed & SubQ Heparin 5000 TID for DVT prophylaxis and assist flap perfusion. Avoid blood transfusion if able. ID: Unasyn Q6H while drains are in place     Pain/Analgesia:         Tylenol, fentanyl  Total fluid rate:          125mL/hr  Bowel regimen:         senokot  DVT proph:                SCD's, subcutaneous heparin  Mouth/eye care:        As needed per patient  Urine: Terrell, placed 11/14. Keep in place for fluid monitoring.   Ancillary consults:    ENT  Family Update:          As available     Disposition:   SICU  Electronically signed by Yana Rosario DO on 11/15/2022 at 5:49 AM

## 2022-11-15 NOTE — PROGRESS NOTES
rest 98% SpO2 post session 99%   Blood Pressure at rest 108/73 mmHg Blood Pressure post session 142/77 mmHg       Functional Status Score-Intensive Care Unit (FSS-ICU)   Rolling -/7   Supine to sit transfer 2/7   Unsupported sitting  4/7   Sit to stand transfers 3/7   Ambulation 1/7   Total  10/35       Therapeutic Exercises:  NA    Patient education  Pt educated on safety    Patient response to education:   Pt verbalized understanding Pt demonstrated skill Pt requires further education in this area   trach yes yes     ASSESSMENT:    Conditions Requiring Skilled Therapeutic Intervention:    [x]Decreased strength     []Decreased ROM  [x]Decreased functional mobility  [x]Decreased balance   [x]Decreased endurance   [x]Decreased posture  []Decreased sensation  []Decreased coordination   []Decreased vision  []Decreased safety awareness   []Increased pain       Comments:  RN reported pt was medically stable. Pt was in bed upon arrival, agreeable to initial evaluation. Pt was still able to effectively communicate despite trach. Increased assistance given for bed mobility. Pt reported slight dizziness at EOB but improved with time. Guarded posture noted with all activity as pt appeared to be mildly nervous. HHA given for a few steps to chair. Pt was left in chair with all needs met and call light in reach. All lines remained intact. RN aware. Treatment:  Patient practiced and was instructed in the following treatment:    Bed mobility training - pt given verbal and tactile cues to facilitate proper sequencing and safety during supine>sit as well as provided with physical assistance. Sitting EOB for >5 minutes for upright tolerance, postural awareness and BLE ROM  Transfer training - pt was given verbal and tactile cues to facilitate proper hand placement, technique and safety during sit to stand, stand to sit and stand pivot transfers as well as provided with physical assistance.   Gait training- pt was given verbal and tactile cues to facilitate safety and balance during ambulation as well as provided with physical assistance. Pt's/ family goals   1. Return home    Prognosis is good for reaching above PT goals. Patient and or family understand(s) diagnosis, prognosis, and plan of care. yes    PHYSICAL THERAPY PLAN OF CARE:    PT POC is established based on physician order and patient diagnosis     Referring provider/PT Order:  Anisa Blake, DO /11/14/22 2330 PT eval and treat  Diagnosis:  Tongue mass [K14.8]  Cancer of oral cavity (HCC) [C06.9]  Oral cavity carcinoma (HCC) [C06.9]  Primary cancer of oral cavity (HCC) [C06.9]  Specific instructions for next treatment:  Progress activity    Current Treatment Recommendations:     [x] Strengthening to improve independence with functional mobility   [] ROM to improve independence with functional mobility   [x] Balance Training to improve static/dynamic balance and to reduce fall risk  [x] Endurance Training to improve activity tolerance during functional mobility   [x] Transfer Training to improve safety and independence with all functional transfers   [x] Gait Training to improve gait mechanics, endurance and asses need for appropriate assistive device  [x] Stair Training in preparation for safe discharge home and/or into the community   [] Positioning to prevent skin breakdown and contractures  [x] Safety and Education Training   [x] Patient/Caregiver Education   [] HEP  [] Other     PT long term treatment goals are located in above grid    Frequency of treatments: 2-5x/week x 1-2 weeks. Time in  1024  Time out  1050    Total Treatment Time  11 minutes     Evaluation Time includes thorough review of current medical information, gathering information on past medical history/social history and prior level of function, completion of standardized testing/informal observation of tasks, assessment of data and education on plan of care and goals.     CPT codes:  [] Low Complexity PT evaluation 81151  [x] Moderate Complexity PT evaluation 18976  [] High Complexity PT evaluation [id number]  [] PT Re-evaluation [id number]  [] Gait training 26846 - minutes  [] Manual therapy 43482 - minutes  [x] Therapeutic activities 98355 11 minutes  [] Therapeutic exercises 16251 - minutes  [] Neuromuscular reeducation 86588 - minutes     Deanne Whitt, PT, DPT  [id number]

## 2022-11-15 NOTE — PROGRESS NOTES
11/15/22 1616   Oxygen Therapy/Pulse Ox   O2 Therapy Oxygen humidified   O2 Device Trach mask   O2 Flow Rate (L/min) (S)  8 L/min   FiO2  (S)  35 %   Heart Rate 88   Resp 18   SpO2 100 %   Pulse Oximeter Device Mode Continuous   Pulse Oximeter Device Location Finger

## 2022-11-15 NOTE — PROGRESS NOTES
Flap checks completed from 0730 to 1200 q30 mins per order. Pulse is audible via doppler, warm to touch, pale to pink in color. Patient in 0/10 pain and no complaints at this time.

## 2022-11-15 NOTE — CONSULTS
SURGICAL INTENSIVE CARE  CONSULT NOTE      Date of admission:  11/14/2022  Reason for ICU transfer:  post-op care of left neck free flap     Physician Consulted: Dr. Abran Teixeira  Reason for Consult: critical care; post-op care of left neck free flap   Referring Physician: Dr. Major Purple:  Singh Morales is a 69 y.o. 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 This was an elective procedure. The patient had a PEG tube placed last week. The patient is otherwise in good health, with medical history significant only for hypertension, hyperlipidemia, and GERD    He  reports that he has never smoked. He has been exposed to tobacco smoke. He has never used smokeless tobacco. He reports that he does not drink alcohol and does not use drugs. HOSPITAL COURSE:  11/14/22  - admitted to SICU following left hemiglossectomy, left neck dissection, left ulnar forearm free flap reconstruction, and open tracheostomy. Flap checks q30min    Past Medical History:   Diagnosis Date    Acid reflux disease     Cancer of oral cavity ([facility] [address] 10/14/2022    Hyperlipidemia     Hypertension        Past Surgical History:   Procedure Laterality Date    COLONOSCOPY  09/27/2022    CYST REMOVAL      upper mid chest    GASTROSTOMY TUBE PLACEMENT N/A 11/8/2022    EGD PEG TUBE PLACEMENT performed by Jaja Bay MD at [address] [place] [address] Right 2021    LARYNGOSCOPY N/A 10/13/2022    DIRECT LARYNGOSCOPY, BRONCHOSCOPY, ESOPHAGOSCOPY performed by Hazel Maldonado MD at [place] [id number] Left 10/04/2022       Medications Prior to Admission:    Prior to Admission medications    Medication Sig Start Date End Date Taking? Authorizing Provider   LORazepam (ATIVAN) 1 MG tablet Take 2 tablets by mouth 2 times daily for 5 days.  11/9/22 11/14/22  Isaias MEJIA Dorothea,    HYDROcodone-acetaminophen (NORCO)  MG per tablet Take 1 tablet by mouth every 6 hours as needed for Pain. Patient not taking: No sig reported    Historical Provider, MD   Cyanocobalamin (VITAMIN B 12 PO) Take 1 tablet by mouth daily    Historical Provider, MD   metoprolol (LOPRESSOR) 100 MG tablet Take 100 mg by mouth daily    Historical Provider, MD   Polyvinyl Alcohol-Povidone (REFRESH OP) Apply 4 drops to eye in the morning and at bedtime Indications: bilateral eyes    Historical Provider, MD   tetrahydrozoline 0.05 % ophthalmic solution Place 4 drops into both eyes 2 times daily    Historical Provider, MD   acetaminophen (TYLENOL) 500 MG tablet Take 1,000 mg by mouth daily as needed for Pain    Historical Provider, MD   ondansetron (ZOFRAN ODT) 4 MG disintegrating tablet Take 1 tablet by mouth every 8 hours as needed for Nausea or Vomiting 10/13/22   Divya Deng DO   aspirin 81 MG EC tablet Take 81 mg by mouth daily    Historical Provider, MD   loratadine (CLARITIN) 10 MG tablet Take 10 mg by mouth daily PRN    Historical Provider, MD   Multiple Vitamins-Minerals (CENTRUM SILVER PO) Take 1 tablet by mouth daily    Historical Provider, MD   Ascorbic Acid (VITAMIN C) 1000 MG tablet Take 1,000 mg by mouth daily    Historical Provider, MD   zinc 50 MG TABS tablet Take 100 mg by mouth 2 times daily    Historical Provider, MD   Cholecalciferol (VITAMIN D3) 50 MCG (2000 UT) CAPS Take 1 capsule by mouth daily    Historical Provider, MD   vitamin B-6 (PYRIDOXINE) 100 MG tablet Take 100 mg by mouth daily    Historical Provider, MD   atorvastatin (LIPITOR) 10 MG tablet Take 10 mg by mouth at bedtime    Historical Provider, MD   omeprazole (PRILOSEC) 20 MG capsule Take 20 mg by mouth daily.       Historical Provider, MD       No Known Allergies    Family History   Problem Relation Age of Onset    High Blood Pressure Mother     Breast Cancer Mother     Heart Disease Mother     Heart Disease Father Other Father         heart valve replacement    High Blood Pressure Sister     No Known Problems Maternal Aunt     No Known Problems Maternal Uncle     No Known Problems Paternal Aunt     No Known Problems Paternal Uncle     Stroke Maternal Grandmother     Cancer Maternal Grandfather         patient doesn't know site. Cancer Paternal Grandmother         patient doesn't know site. Stroke Paternal Grandfather     No Known Problems Maternal Cousin     No Known Problems Paternal Cousin     No Known Problems Daughter     No Known Problems Daughter     No Known Problems Son     No Known Problems Grandchild          REVIEW OF SYSTEMS  Unable to be obtained due to the patient's recent state of  general anesthesia    PHYSICAL EXAM:  BP (!) 172/92   Pulse 84   Temp 97.7 °F (36.5 °C) (Temporal)   Resp 20   Ht 5' 10\" (1.778 m)   Wt 225 lb (102.1 kg)   SpO2 97%   BMI 32.28 kg/m²     General Appearance:  awake, obtunded,   Skin:  Skin color, texture, turgor normal.   Head/face:  Left neck incision with moderate swelling, no drainage. S/p left hemiglossectomy with ulnar flap reconstruction. Doppler pulse detectable. Flap is pink and mildly swollen. 2 neck drains present with serosanguinous drainage  Eyes:  No gross abnormalities. Sclera nonicteric  Lungs/Chest:  Normal expansion. No respiratory distress. On 10L trach mask. No chest wall tenderness  Heart: Warm throughout. Regular rate   Abdomen:  Soft, no tenderness, non distended. No palpable organomegaly or masses. Extremities: Extremities warm to touch, pink,. Left forearm with surgical dressing present. Surgical drain with serosanguinous output    LABS:  CBC  Recent Labs     11/14/22  1355   HCT 33.0*     BMP  Recent Labs     11/14/22  1355   K 4.4   CREATININE 0.8     Liver Function  No results for input(s): AMYLASE, LIPASE, BILITOT, BILIDIR, AST, ALT, ALKPHOS, PROT, LABALBU in the last 72 hours. No results for input(s): LACTATE in the last 72 hours.   No results for input(s): INR, PTT in the last 72 hours. Invalid input(s): PT      RADIOLOGY  I reviewed all relevant imaging from this admission as well as the past studies available in the EMR      ASSESSMENT / PLAN:  69 y.o. 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 Patient not aware enough for PCA, fentanyl PRN. Scheduled tylenol  CV: Discontinue arterial line POD1. Maintain systolic blood pressure between 100-160. Keep MAP greater than 70. Pulm: Wean O2 as able. Humidified air at all times. Trach suction Q2H to prevent mucus plug and per protocol. Maintain cuff inflation for 2 days postoperatively. No tracheostomy ties  GI: PPI. Bowel regimen. Dietary consult. Zofran PRN. Renal: No acute issues. Monitor UOP & Electrolytes. Endocrine: Decadron 8 mg Q8H 3 days postoperatively followed by taper. Monitor glucose. MSK: S/p left hemiglossectomy with left ulnar forearm free flap reconstruction. Check flap appearance and doppler signals q1hr. Call ENT resident if changes appear   Heme: SCDs while in bed & SubQ Heparin 5000 TID for DVT prophylaxis and assist flap perfusion  ID: Unasyn Q6H while drains are in place    Pain/Analgesia: Tylenol, fentanyl  Total fluid rate: 125mL/hr  Bowel regimen: senokot  DVT proph:  SCD's, subcutaneous heparin  Mouth/eye care: As needed per patient  Urine: Terrell, placed 11/14. Keep in place for fluid monitoring.   Ancillary consults: ENT  Family Update: As available    Disposition: SICU    Electronically signed by Ruben Golden DO on 11/14/2022 at 11:14 PM

## 2022-11-15 NOTE — BRIEF OP NOTE
Brief Postoperative Note      Patient: Clarisse Gee  YOB: 1955  MRN: [id number]    Date of Procedure: 11/14/2022    Pre-Op Diagnosis: Tongue mass [K14.8]  Cancer of oral cavity ([address] [C06.9]    Post-Op Diagnosis: Same       Procedure(s): MOUTH LESION BIOPSY EXCISION,NECK DISSECTION RADICAL,FREE FLAP GRAFT NECK DISSECTION-ROBOTIC    Surgeon(s):  DO Ian Vazquez MD    Assistant:  Surgical Assistant: Jose Manuel Bailey  Resident: Stefani Knott DO; Anisa Blake DO; Terrie Monk DO    Anesthesia: General    Estimated Blood Loss (mL): 422    Complications: None    Specimens:   ID Type Source Tests Collected by Time Destination   A : LESSER CORNU HYOID MARGIN Tissue Tissue SURGICAL PATHOLOGY Ian Martinez MD 11/14/2022 1135    B : LEFT NECK LEVEL 1A, 1B Tissue Tissue SURGICAL PATHOLOGY Ian Martinez MD 11/14/2022 1146    C : LEFT HEMIGLOSSECTOMY Tissue Tissue SURGICAL PATHOLOGY Ian Martinez MD 11/14/2022 1159    D : Penny Bleak  Tissue Tissue SURGICAL PATHOLOGY Ian Martinez MD 11/14/2022 1215    E : HYOID BONE AND HYPOGLOSSUS MUSCLE. BLACK PAEZ POSITIVE FROZEN SECTION. HEMIGLOSSECTOMY MARGIN. YELLOW LATERAL HYOID BONE MARGIN. BLUE MEDIAL HYOID BONE MARGIN. ORANGE PATIENT DEEP MUSCLE MARGIN. Tissue Tissue SURGICAL PATHOLOGY Ian Martinez MD 11/14/2022 1221    F : LEFT NECK CONTENTS LEVELS 2 THROUGH 4  Tissue Tissue SURGICAL PATHOLOGY Ian Martinez MD 11/14/2022 1321        Implants:  Implant Name Type Inv. Item Serial No.  Lot No. LRB No. Used Action   GEM  MICROVASCULAR  ANASTOMOTIC  DEVICE 3.0MM   [id number]  [id number] Left 1 Implanted    2MM [facility] VASC - [id number] Anastomosis rings  2MM [employer] VASC  [employer]WD [id number] Left 1 Implanted         Drains:   Closed/Suction Drain Left; Anterior Thigh Bulb (Active)       Closed/Suction Drain Left; Anterior Neck Bulb (Active) Dressing Status Clean, dry & intact 11/14/22 2257   Drain Status To bulb suction 11/14/22 2257       Closed/Suction Drain Left; Anterior Neck Bulb (Active)   Dressing Status Clean, dry & intact 11/14/22 2258   Drain Status To bulb suction 11/14/22 2258       Gastrostomy/Enterostomy/Jejunostomy Tube LUQ 20 fr (Active)   Drainage Appearance None 11/08/22 0844   Site Description Clean, dry & intact 11/08/22 0844   G Port Status Clamped 11/08/22 0844   Surrounding Skin Clean, dry & intact 11/08/22 0844   Dressing Status Clean, dry & intact 11/08/22 0844   Dressing Type Dry dressing 11/08/22 0844       Urinary Catheter 11/14/22 2 Way (Active)       Findings: see dictation     Electronically signed by Cherre Severin, DO on 11/14/2022 at 11:01 PM

## 2022-11-15 NOTE — PROGRESS NOTES
Flap checks completed from 1200 to 1600 q30 mins per order. Pulse is audible via doppler, warm to touch, pale to pink in color. Patient has no complaints at this time.

## 2022-11-15 NOTE — DISCHARGE SUMMARY
Physician Discharge Summary     Patient ID:  Melly Garcia  [id number]  75 y.o.  1955    Admit date: 11/14/2022    Discharge date and time: No discharge date for patient encounter. Admitting Physician: Ba Mayberry MD     Admission Diagnoses: Tongue mass [K14.8]  Cancer of oral cavity ([facility] [address] [C06.9]  Oral cavity carcinoma ([facility] [address] [C06.9]  Primary cancer of oral cavity ([facility] [address] [C06.9]    Discharge Diagnoses: Principal Problem:    Oral cavity carcinoma ([address]  Active Problems:    Tongue mass    Pre-operative laboratory examination    Primary cancer of oral cavity (HCC)    Cancer of oral cavity ([facility] [address]  Resolved Problems:    * No resolved hospital problems. 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 Had PEG tube placed last week. Flap checks q 30 min. 11/15/22 - Agitation is improved during the day. Start trickle tube feeds. PRN Ativan. Wean Precedex. 11/16/22 - No acute overnight events. 11/17/22 - AM-PAC 15/24. Transfer to 4500. Consults:   IP CONSULT TO CRITICAL CARE  IP CONSULT TO SOCIAL WORK  IP CONSULT TO CASE MANAGEMENT  IP CONSULT TO DIETITIAN    Significant Diagnostic Studies:   No results found. Discharge Exam:  Physical Exam  Constitutional: Appears awake, alert, cooperative, no apparent distress   Eyes: Lids and lashes normal, pupils equal, round and reactive to light,extra ocular muscles intact, sclera clear, conjunctiva normal   HENT: Doppler sounds with arterial waveform at marked site.  Flap appears pink without any increased swelling or signs of venous congestion. Soft, warm to touch with normal capillary refills. Incisions are clean, moist and intact. Neck:  Supple, soft and flat without evidence of hematoma or fluid collection. Incisions clean, moist and intact. Shiley 6-0 cuffed trach midline and intact. Mild  secretions. Extremities Flap donor extremity warm with normal capillary refills, strong distal pulses, intact sensation and movement. No cyanosis or edema. Skin graft donor site with tegaderm/Suresite in place   Cardiovascular: Regular rate   Neurologic:  Cranial nerves II-XII are grossly intact. Marginal Mandibular nerve weak, HB III/IV     Drains: All drains holding suction with serosanguinous output          Disposition: home    In process/preliminary results:  Outstanding Order Results       Date and Time Order Name Status Description    11/15/2022 12:00 AM Culture, MRSA, Screening In process     11/14/2022 12:00 AM Surgical Pathology In process             Patient Instructions:   Current Discharge Medication List             Details   HYDROcodone-acetaminophen (NORCO)  MG per tablet Take 1 tablet by mouth every 6 hours as needed for Pain.       Cyanocobalamin (VITAMIN B 12 PO) Take 1 tablet by mouth daily      metoprolol (LOPRESSOR) 100 MG tablet Take 100 mg by mouth daily      Polyvinyl Alcohol-Povidone (REFRESH OP) Apply 4 drops to eye in the morning and at bedtime Indications: bilateral eyes      tetrahydrozoline 0.05 % ophthalmic solution Place 4 drops into both eyes 2 times daily      acetaminophen (TYLENOL) 500 MG tablet Take 1,000 mg by mouth daily as needed for Pain      ondansetron (ZOFRAN ODT) 4 MG disintegrating tablet Take 1 tablet by mouth every 8 hours as needed for Nausea or Vomiting  Qty: 10 tablet, Refills: 0      aspirin 81 MG EC tablet Take 81 mg by mouth daily      loratadine (CLARITIN) 10 MG tablet Take 10 mg by mouth daily PRN      Multiple Vitamins-Minerals (CENTRUM SILVER PO) Take 1 tablet by mouth daily      Ascorbic Acid (VITAMIN C) 1000 MG tablet Take 1,000 mg by mouth daily      zinc 50 MG TABS tablet Take 100 mg by mouth 2 times daily      Cholecalciferol (VITAMIN D3) 50 MCG (2000 UT) CAPS Take 1 capsule by mouth daily      vitamin B-6 (PYRIDOXINE) 100 MG tablet Take 100 mg by mouth daily      atorvastatin (LIPITOR) 10 MG tablet Take 10 mg by mouth at bedtime      omeprazole (PRILOSEC) 20 MG capsule Take 20 mg by mouth daily. Otolaryngology (ENT) Post-Op Instructions    Follow-up with Dr. Katie Fowler in 1 week(s). Please call office to schedule and confirm your appointment. Please follow the instructions below:    DIET INSTRUCTIONS:  Bolus feeds 4 times daily. 240cc with 30cc water flushes before and after meals. Stool softeners until regular bowel movements        ACTIVITY INSTRUCTIONS:  Increase activity as tolerated    Elevate Head of bed   No heavy lifting or strenuous activity     No driving while taking pain medication      WOUND/DRESSING INSTRUCTIONS:  May shower, but avoid baths, swimming pools or hot tubes until your wound is healed   Apply a layer of Bacitracin ointment to your wound at least thrice a day. Keep wound(s) clean and ALWAYS moist with ointment. You may clean your wound with baby wipe or gently let water run over area in shower. Gently dab area to dry and dress wound with Bacitracin ointment   You may also cover your wound(s) with hydrogels, hydrofibers, alginates, or soft silicone dressing  For best wound-healing and cosmetic results:   Use sunscreen of at least SPF 30 on your wound(s) when outdoor  Again, always keep wound(s) moist or wet with ointment  Sutures/Staples to be removed in 1 week     MEDICATION INSTRUCTIONS:  Take medication as prescribed. When taking pain medications, you may experience dizziness or drowsiness. Do not drink alcohol or drive when taking these medications.   You may take Ibuprofen (over the counter) as per directions for mild pain. Do not take any other acetaminophen (Tylenol) products while taking your pain medication. You may experience constipation while taking pain medication - You may take over the counter stool softeners: docuscate (Colace) or sennosides S (Senokot - S). Call physician for any of the following or for questions/concerns:   Fever over 101° F    Redness, swelling, hardness or warmth at the wound site (s)  Unrelieved nausea/vomiting    Foul smelling or cloudy drainage at the wound site (s)   Unrelieved pain or increase in pain     Increase in shortness of breath       Drain Care  What is the drain for? Your drain is to stop fluid from building up under the skin. It also helps your incision to heal.  Your doctor will take the drain out when there is less and less fluid coming out. What supplies do I need? A cup for the fluid to be emptied into and to measure the fluid. The chart to record the amount of fluid (if your surgeon tells you to do so). 4 x 4 gauze  Tape   Safety pin  Antibiotic ointment (available over the counter)    Drain care:  Wash you hands before you change the dressing or empty the drain. This is important to prevent infection. Change the dressing to the drain tube site daily. Apply a small amount of antibiotic ointment to the skin at the drain tube site with each dressing change. How do I empty the drain? Hold the plastic bulb in an upright position with one hand and take the cap off with the other hand. With the bulb in one hand and the cup in the other, turn the bulb over and place the end into the cup. Squeeze gently and empty the fluid into the cup. Squeeze the bulb tightly with one hand (to flatten it as much as possible) and recap it with the other hand. This starts the suction again inside of the bulb. Do not squeeze the bulb if the cap is on. Record the amount of drainage if your physician has asked you to do so.   Discard the drainage into the toilet. Rinse the cup so it is clean and ready to be used again the next time. Wash your hands. Re-pin the tape tab of the drain tube back to your clothing. The bulb should always be lower than your incision. This will prevent drainage from flowing back into the tubing and the incision. How often do I need to empty my drain? Usually you empty the drain when it is half full. Most find they need to empty the drain 3 times a day- when you wake up in the morning, mid day, and before bed. If the bulb fills up more than this, you may need to empty the drain more often. How much drainage should there be? The amount of drainage may vary from day to day. It should be less each day. If you increase your activity, it may increase the amount of drainage, temporarily. What color should the drainage be? The color will vary. It may go from bright red to pink, then to clear yellow. What do I do with my drainage record? Take it to your follow up visit with your surgeon. May I shower? Yes, It may help to secure the drain to an old cloth/robe belt that is around your waist.     When should I call the doctor? Call your doctor if:  You have an elevated temperature (greater than 101 or higher)  The drainage increases or stops suddenly  The drain stitches come loose or break, or if the drain comes out  The area on your skin around the drain gets red, swollen, or painful  The fluid coming out of the drain changes (has pus in it, becomes bright red, or has a bad smell). Follow up: In resident clinic in Dr. Sheri Lanes office on 11/23. No follow-up provider specified.      Signed:  Electronically signed by Cruzito Bowman DO   DD: 11/21/22

## 2022-11-15 NOTE — CARE COORDINATION
Pod#1 s/p L hemiglossectomy, Lneck dissection, L  ulnar forearm free flap reconstruction, and open tracheostomy for squamous cell ca. of the base of the tongue. Per notes, pt had peg placed last week. Spoke with pt's nurse. He is very anxious and tearful this am. Will not visit with pt at this time. 10:30 am  Vm left with wife to initiate discharge planning. Await return call.

## 2022-11-15 NOTE — PROGRESS NOTES
ENT Flap Check     Patient doing well. No respiratory distress, currently on room air. Able to communicate appropriately. Flap appears well perfused, good capillary refill, no evidence of venous congestion or hematoma. Intraoral doppler checks show good triphasic waves.      Will continue to monitor vitals closely  Monitor pain control  Continue Q 1 hour nursing flap checks     Call ENT with any concerns or questions

## 2022-11-15 NOTE — PROGRESS NOTES
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]     Surgery/Procedures:  MOUTH LESION BIOPSY EXCISION,NECK DISSECTION RADICAL,FREE FLAP GRAFT NECK DISSECTION-ROBOTIC        Pertinent Medical History:    Past Medical History:   Diagnosis Date    Acid reflux disease     Cancer of oral cavity ([facility] 10/14/2022    Hyperlipidemia     Hypertension         *Precautions:  Fall risk, maintain NWB LUE s/p flap until further clarified, CARLITOS x 3, O2 via trach mask, PEG    Assessment of current deficits   [x] Functional mobility  [x]ADLs  [x] Strength               []Cognition   [x] Functional transfers   [x] IADLs         [x] Safety Awareness   [x]Endurance   [x] Fine Coordination        [x] ROM     [] Vision/perception   []Sensation    [x]Gross Motor Coordination [x] Balance   [] Delirium                  [x]Motor Control     [x] Communication    OT PLAN OF CARE   OT POC based on physician orders, patient diagnosis and results of clinical assessment.        Frequency/Duration: 1-3 days/wk for 1-2 weeks PRN    Specific OT Treatment to include:   ADL retraining/adapted techniques and AE recommendations to increase functional independence within precautions                    Energy conservation techniques to improve tolerance for selfcare routine   Functional transfer/mobility training/DME recommendations for increased independence, safety and fall prevention Patient/family education to increase safety and functional independence within precautions              Environmental modifications for safe mobility and completion of ADLs                           Sensory re-education techniques to improve extremity awareness, maintain skin integrity and improve hand function                             Splinting/positioning needs to maintain joint/skin integrity and prevent contractures  Therapeutic activity to improve functional performance during ADLs/IADLs                                         Therapeutic exercise to improve tolerance and functional strength for ADLs   Balance retraining exercises/tasks for facilitation of postural control with dynamic challenges during ADLs . Positioning to improve functional independence    Recommended Adaptive Equipment: TBA     Home Living: Pt lives with wife/family  in a 1 floor plan with 2 step(s) to enter and 1 rail(s); bed/bath on first floor  Bathroom setup: walk in shower with small step  Equipment owned: no DME    Prior Level of Function: IND with ADLs;  IND with IADLs. No device for ambulation. Driving: yes  Occupation: retired    Pain Level: pt c/o 1/10 surgical pain  this session    Cognition: A&O: 4/4    Follows 1-2 step commands appropriately. Memory: Fair   Comprehension Fair   Problem solving: Fair   Judgement/safety: Fair               Communication skills: grossly WFL (using white board & gestures to communicate)           Vision: grossly WFL               Glasses:yes (not present)                                                   Hearing: WFL     RASS: 0  CAM-ICU: (NT) Delirium    UE Assessment:  Hand Dominance: Right [x]  Left []     ROM Strength   RUE  WFL Grossly WFL   LUE WFL Grossly WFL     Sensation: No c/o numbness/tingling in extremities.    Tone: WNL   Edema: [facility]     Functional Assessment:  AM-PAC Daily Activity Raw Score: 8/24   Initial Eval Status  Date: 11/15/22 Treatment Status  Date: STGs = LTGs  Time frame: 7-14 days   Feeding NGT   Re-assess when indicated     Grooming Max A                        Min A   while standing sink level requiring AD as needed for balance and demonstrating G tolerance      UB dressing/bathing Max A                        Min A       LB dressing/bathing Dep                        Min A   using AE as needed for safe reach/ energy conservation       Toileting NT                        Min A     Bed Mobility  Supine to sit: Max A    Sit to supine:   NT                        Min A  in prep of ADL tasks & transfers   Functional Transfers Sit to stand: Mod A    Stand to sit:   Mod A                        S  sit<>stand/functional bathroom transfers using AD/DME as needed for balance and safety   Functional Mobility Mod A  SPT to chair                       S   functional/bathroom mobility using AD as needed & demonstrating G safety     Balance Sitting:     Static:  SBA    Dynamic:Min A  Standing: Mod A  S dynamic sitting balance; S dynamic standing balance  during ADL tasks & transfers   Endurance/  Activity Tolerance   F tolerance with light  activity. G   tolerance with moderate activity/self care routine   Visual/  Perceptual   WFL                     Vitals:   Heart Rate at rest 74 bpm Heart Rate post session 73 bpm   SpO2 at rest 98% SpO2 post session 99%   Blood Pressure at rest 108/73 mmHg Blood Pressure post session 142/77 mmHg       Treatment: OT treatment provided this date includes:  Bed mobility: Instruction on precautions/ medical lines prior to bed mobility to facilitate safe transfers and ADLS. HOB elevated to assist. Pt with no c/o dizziness EOB. Balance retraining: Performed sitting/standing balance ex's with instruction to facilitate righting reactions with postural changes during ADLS. ADL retraining: Instruction on adapted techniques/AE(reacher) to increase independence and safe reach during dressing/bathing activities.   Pt can benefit from further instruction. ROM/exercise: Pt educated on ROM ex's within restrictions to perform bedside. Pt further educated on L UE jt protection during ADLs and transfers. L UE elevated on pillow for edema control. Delirium Prevention: Environmental and sensory modifications assessed and implemented to decrease ICU acquired delirium and to improve overall orientation, mentation and pt interaction with family/staff. Line management and environmental modifications made prior to and end of session to ensure patient safety and to increase efficiency of session. Skilled monitoring of HR, O2 saturation, blood pressure and patient's response to activity performed throughout session. Comments: OK from RN to see patient. Upon arrival, patient supine in bed, agreeable to session. Pt demo fair tolerance with fair understanding of education/techniques. At end of session, patient left seated in chair to increase activity tolerance. Call light within reach, all lines and tubes intact. Pt instructed on use of call light for assistance and fall prevention. .    Patient presents with decreased ROM/strength,activity tolerance, dynamic balance, functional mobility limiting completion of ADLs and safety. Pt can benefit from continued skilled OT to increase safety, functional independence and quality of life. Rehab Potential: good for established goals    Patient / Family Goal: to return to PLOF    Patient and/or family were instructed/educated on diagnosis, prognosis/goals and plan of care. Pt demonstrated G understanding. Evaluation Complexity: Moderate     History: Expanded chart review of consults, imaging, and psychosocial history related to current functional performance. Exam: 5+ performance deficits identified limiting functional independence and safe return home   Assistance/Modification: Min/mod assistance or modifications required to perform tasks.  May have comorbidities that affect occupational performance. [] Malnutrition indicators have been identified and nursing has been notified to ensure a dietitian consult is ordered. Time In:1035             Time Out: 1100         Total Treatment time: 10   Min Units   OT Eval Low 06108     OT Eval Medium 20041 X    OT Eval High 47937     OT Re-Eval [id number]     Therapeutic Ex 20276     Therapeutic Activities 15557 10 1   ADL/Self Care 76625     Orthotic Management 11445     Neuro Re-Ed 39589     Non-Billable Time        Evaluation time includes thorough review of current medical information, gathering information on past medical history/social history and prior level of function, completion of standardized testing/informal observation of tasks, assessment of data and development of POC/Goals.      Maricel Kruger, OTR/L 1111

## 2022-11-16 LAB
ALBUMIN SERPL-MCNC: 3.5 G/DL (ref 3.5–5.2)
ALP BLD-CCNC: 61 U/L (ref 40–129)
ALT SERPL-CCNC: 31 U/L (ref 0–40)
ANION GAP SERPL CALCULATED.3IONS-SCNC: 13 MMOL/L (ref 7–16)
AST SERPL-CCNC: 75 U/L (ref 0–39)
BASOPHILS ABSOLUTE: 0.01 E9/L (ref 0–0.2)
BASOPHILS RELATIVE PERCENT: 0.1 % (ref 0–2)
BILIRUB SERPL-MCNC: 0.4 MG/DL (ref 0–1.2)
BUN BLDV-MCNC: 17 MG/DL (ref 6–23)
CALCIUM IONIZED: 1.24 MMOL/L (ref 1.15–1.33)
CALCIUM SERPL-MCNC: 9.2 MG/DL (ref 8.6–10.2)
CHLORIDE BLD-SCNC: 106 MMOL/L (ref 98–107)
CO2: 24 MMOL/L (ref 22–29)
CREAT SERPL-MCNC: 0.8 MG/DL (ref 0.7–1.2)
EOSINOPHILS ABSOLUTE: 0 E9/L (ref 0.05–0.5)
EOSINOPHILS RELATIVE PERCENT: 0 % (ref 0–6)
GFR SERPL CREATININE-BSD FRML MDRD: >60 ML/MIN/1.73
GLUCOSE BLD-MCNC: 144 MG/DL (ref 74–99)
HCT VFR BLD CALC: 30.3 % (ref 37–54)
HEMOGLOBIN: 10.4 G/DL (ref 12.5–16.5)
IMMATURE GRANULOCYTES #: 0.07 E9/L
IMMATURE GRANULOCYTES %: 0.5 % (ref 0–5)
LYMPHOCYTES ABSOLUTE: 0.49 E9/L (ref 1.5–4)
LYMPHOCYTES RELATIVE PERCENT: 3.6 % (ref 20–42)
MAGNESIUM: 2 MG/DL (ref 1.6–2.6)
MCH RBC QN AUTO: 29.5 PG (ref 26–35)
MCHC RBC AUTO-ENTMCNC: 34.3 % (ref 32–34.5)
MCV RBC AUTO: 85.8 FL (ref 80–99.9)
MONOCYTES ABSOLUTE: 0.87 E9/L (ref 0.1–0.95)
MONOCYTES RELATIVE PERCENT: 6.4 % (ref 2–12)
MRSA CULTURE ONLY: NORMAL
NEUTROPHILS ABSOLUTE: 12.18 E9/L (ref 1.8–7.3)
NEUTROPHILS RELATIVE PERCENT: 89.4 % (ref 43–80)
OVALOCYTES: ABNORMAL
PDW BLD-RTO: 12.8 FL (ref 11.5–15)
PHOSPHORUS: 3.3 MG/DL (ref 2.5–4.5)
PLATELET # BLD: 202 E9/L (ref 130–450)
PMV BLD AUTO: 10.5 FL (ref 7–12)
POIKILOCYTES: ABNORMAL
POTASSIUM SERPL-SCNC: 3.8 MMOL/L (ref 3.5–5)
RBC # BLD: 3.53 E12/L (ref 3.8–5.8)
SODIUM BLD-SCNC: 143 MMOL/L (ref 132–146)
TOTAL PROTEIN: 5.9 G/DL (ref 6.4–8.3)
TSH SERPL DL<=0.05 MIU/L-ACNC: 0.41 UIU/ML (ref 0.27–4.2)
WBC # BLD: 13.6 E9/L (ref 4.5–11.5)

## 2022-11-16 PROCEDURE — 97535 SELF CARE MNGMENT TRAINING: CPT

## 2022-11-16 PROCEDURE — 80053 COMPREHEN METABOLIC PANEL: CPT

## 2022-11-16 PROCEDURE — 97530 THERAPEUTIC ACTIVITIES: CPT

## 2022-11-16 PROCEDURE — 2580000003 HC RX 258

## 2022-11-16 PROCEDURE — 2580000003 HC RX 258: Performed by: ANESTHESIOLOGY

## 2022-11-16 PROCEDURE — 84443 ASSAY THYROID STIM HORMONE: CPT

## 2022-11-16 PROCEDURE — 99024 POSTOP FOLLOW-UP VISIT: CPT | Performed by: SURGERY

## 2022-11-16 PROCEDURE — 2580000003 HC RX 258: Performed by: OTOLARYNGOLOGY

## 2022-11-16 PROCEDURE — 6360000002 HC RX W HCPCS

## 2022-11-16 PROCEDURE — 92523 SPEECH SOUND LANG COMPREHEN: CPT | Performed by: SPEECH-LANGUAGE PATHOLOGIST

## 2022-11-16 PROCEDURE — 6360000002 HC RX W HCPCS: Performed by: OTOLARYNGOLOGY

## 2022-11-16 PROCEDURE — 92526 ORAL FUNCTION THERAPY: CPT | Performed by: SPEECH-LANGUAGE PATHOLOGIST

## 2022-11-16 PROCEDURE — 83735 ASSAY OF MAGNESIUM: CPT

## 2022-11-16 PROCEDURE — 85025 COMPLETE CBC W/AUTO DIFF WBC: CPT

## 2022-11-16 PROCEDURE — 92610 EVALUATE SWALLOWING FUNCTION: CPT | Performed by: SPEECH-LANGUAGE PATHOLOGIST

## 2022-11-16 PROCEDURE — 2700000000 HC OXYGEN THERAPY PER DAY

## 2022-11-16 PROCEDURE — 6370000000 HC RX 637 (ALT 250 FOR IP)

## 2022-11-16 PROCEDURE — 92507 TX SP LANG VOICE COMM INDIV: CPT | Performed by: SPEECH-LANGUAGE PATHOLOGIST

## 2022-11-16 PROCEDURE — 6370000000 HC RX 637 (ALT 250 FOR IP): Performed by: OTOLARYNGOLOGY

## 2022-11-16 PROCEDURE — 2000000000 HC ICU R&B

## 2022-11-16 PROCEDURE — 6370000000 HC RX 637 (ALT 250 FOR IP): Performed by: SURGERY

## 2022-11-16 PROCEDURE — 82330 ASSAY OF CALCIUM: CPT

## 2022-11-16 PROCEDURE — 84100 ASSAY OF PHOSPHORUS: CPT

## 2022-11-16 PROCEDURE — 36415 COLL VENOUS BLD VENIPUNCTURE: CPT

## 2022-11-16 RX ORDER — MECOBALAMIN 5000 MCG
10 TABLET,DISINTEGRATING ORAL NIGHTLY PRN
Status: DISCONTINUED | OUTPATIENT
Start: 2022-11-16 | End: 2022-11-16

## 2022-11-16 RX ORDER — MECOBALAMIN 5000 MCG
10 TABLET,DISINTEGRATING ORAL NIGHTLY
Status: DISCONTINUED | OUTPATIENT
Start: 2022-11-16 | End: 2022-11-21 | Stop reason: HOSPADM

## 2022-11-16 RX ADMIN — ACETAMINOPHEN 650 MG: 160 SOLUTION ORAL at 18:00

## 2022-11-16 RX ADMIN — ACETAMINOPHEN 650 MG: 160 SOLUTION ORAL at 12:13

## 2022-11-16 RX ADMIN — ATORVASTATIN CALCIUM 10 MG: 20 TABLET, FILM COATED ORAL at 20:42

## 2022-11-16 RX ADMIN — AMPICILLIN SODIUM AND SULBACTAM SODIUM 3000 MG: 2; 1 INJECTION, POWDER, FOR SOLUTION INTRAMUSCULAR; INTRAVENOUS at 12:18

## 2022-11-16 RX ADMIN — BACITRACIN ZINC: 500 OINTMENT TOPICAL at 05:55

## 2022-11-16 RX ADMIN — SODIUM CHLORIDE, PRESERVATIVE FREE 10 ML: 5 INJECTION INTRAVENOUS at 20:42

## 2022-11-16 RX ADMIN — BACITRACIN ZINC: 500 OINTMENT TOPICAL at 16:23

## 2022-11-16 RX ADMIN — AMPICILLIN SODIUM AND SULBACTAM SODIUM 3000 MG: 2; 1 INJECTION, POWDER, FOR SOLUTION INTRAMUSCULAR; INTRAVENOUS at 05:54

## 2022-11-16 RX ADMIN — AMPICILLIN SODIUM AND SULBACTAM SODIUM 3000 MG: 2; 1 INJECTION, POWDER, FOR SOLUTION INTRAMUSCULAR; INTRAVENOUS at 18:00

## 2022-11-16 RX ADMIN — ONDANSETRON 4 MG: 2 INJECTION INTRAMUSCULAR; INTRAVENOUS at 17:14

## 2022-11-16 RX ADMIN — ACETAMINOPHEN 650 MG: 160 SOLUTION ORAL at 23:00

## 2022-11-16 RX ADMIN — HEPARIN SODIUM 5000 UNITS: 10000 INJECTION INTRAVENOUS; SUBCUTANEOUS at 22:18

## 2022-11-16 RX ADMIN — OXYCODONE HYDROCHLORIDE 5 MG: 5 SOLUTION ORAL at 08:43

## 2022-11-16 RX ADMIN — BACITRACIN ZINC: 500 OINTMENT TOPICAL at 23:47

## 2022-11-16 RX ADMIN — OXYCODONE HYDROCHLORIDE 5 MG: 5 SOLUTION ORAL at 20:42

## 2022-11-16 RX ADMIN — AMPICILLIN SODIUM AND SULBACTAM SODIUM 3000 MG: 2; 1 INJECTION, POWDER, FOR SOLUTION INTRAMUSCULAR; INTRAVENOUS at 23:54

## 2022-11-16 RX ADMIN — ACETAMINOPHEN 650 MG: 160 SOLUTION ORAL at 05:27

## 2022-11-16 RX ADMIN — Medication 10 MG: at 20:42

## 2022-11-16 RX ADMIN — TRIMETHOBENZAMIDE HYDROCHLORIDE 200 MG: 100 INJECTION INTRAMUSCULAR at 17:16

## 2022-11-16 RX ADMIN — HEPARIN SODIUM 5000 UNITS: 10000 INJECTION INTRAVENOUS; SUBCUTANEOUS at 05:27

## 2022-11-16 RX ADMIN — TETRAHYDROZOLINE HCL 4 DROP: 0.05 SOLUTION/ DROPS OPHTHALMIC at 20:42

## 2022-11-16 RX ADMIN — METOPROLOL TARTRATE 100 MG: 50 TABLET, FILM COATED ORAL at 08:45

## 2022-11-16 RX ADMIN — TETRAHYDROZOLINE HCL 4 DROP: 0.05 SOLUTION/ DROPS OPHTHALMIC at 08:46

## 2022-11-16 RX ADMIN — SODIUM CHLORIDE, PRESERVATIVE FREE 10 ML: 5 INJECTION INTRAVENOUS at 08:45

## 2022-11-16 RX ADMIN — HEPARIN SODIUM 5000 UNITS: 10000 INJECTION INTRAVENOUS; SUBCUTANEOUS at 14:23

## 2022-11-16 RX ADMIN — SODIUM CHLORIDE, PRESERVATIVE FREE 10 ML: 5 INJECTION INTRAVENOUS at 08:46

## 2022-11-16 RX ADMIN — LANSOPRAZOLE 30 MG: KIT at 05:57

## 2022-11-16 ASSESSMENT — PAIN DESCRIPTION - ORIENTATION
ORIENTATION: LEFT
ORIENTATION: LEFT

## 2022-11-16 ASSESSMENT — PAIN SCALES - GENERAL
PAINLEVEL_OUTOF10: 5
PAINLEVEL_OUTOF10: 1
PAINLEVEL_OUTOF10: 3
PAINLEVEL_OUTOF10: 5
PAINLEVEL_OUTOF10: 1
PAINLEVEL_OUTOF10: 1
PAINLEVEL_OUTOF10: 3
PAINLEVEL_OUTOF10: 1

## 2022-11-16 ASSESSMENT — PAIN DESCRIPTION - PAIN TYPE
TYPE: ACUTE PAIN
TYPE: ACUTE PAIN

## 2022-11-16 ASSESSMENT — PAIN - FUNCTIONAL ASSESSMENT
PAIN_FUNCTIONAL_ASSESSMENT: ACTIVITIES ARE NOT PREVENTED
PAIN_FUNCTIONAL_ASSESSMENT: ACTIVITIES ARE NOT PREVENTED

## 2022-11-16 ASSESSMENT — PAIN DESCRIPTION - FREQUENCY
FREQUENCY: INTERMITTENT
FREQUENCY: INTERMITTENT

## 2022-11-16 ASSESSMENT — PAIN DESCRIPTION - ONSET
ONSET: GRADUAL
ONSET: GRADUAL

## 2022-11-16 ASSESSMENT — PAIN DESCRIPTION - LOCATION
LOCATION: ARM
LOCATION: ARM

## 2022-11-16 ASSESSMENT — PAIN DESCRIPTION - DESCRIPTORS
DESCRIPTORS: ACHING
DESCRIPTORS: ACHING;SORE

## 2022-11-16 NOTE — PROGRESS NOTES
ENT Flap Check     Patient doing well. No respiratory distress, currently on room air. Able to communicate appropriately. Flap appears well perfused, good capillary refill, no evidence of venous congestion or hematoma. Intraoral doppler checks show good triphasic waves. Will continue to monitor vitals closely  Monitor pain control  Continue Q 1 hour nursing flap checks     Call ENT with any concerns or questions          Ludy Massed  1955      I have discussed the case, including pertinent history and exam findings with the resident. I have seen and examined the patient and the key elements of the encounter have been performed by me. I agree with the assessment, plan and orders as documented by the resident. Patient here for follow up of medical problems. Remainder of medical problems as per resident note.       1635 [facility], DO  11/16/22

## 2022-11-16 NOTE — PROGRESS NOTES
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 Pt ambulated without device and was independent PTA. Pt's children can assist at discharge. OBJECTIVE:   Initial Evaluation  Date: 11/15/22 Treatment  22 Short Term/ Long Term   Goals   AM-PAC 6 Clicks  70/44    Was pt agreeable to Eval/treatment? yes Yes     Does pt have pain? 1/10 surgical pain Reports pain today is \"not bad\"    Bed Mobility  Rolling: NT  Supine to sit: MaxA with HOB elevated  Sit to supine: NT  Scooting: MaxA Rolling: NT  Supine to sit: Mod A  Sit to supine: NT  Scooting: Min A to EOB Mod Independent   Transfers Sit to stand: ModA  Stand to sit: ModA  Stand pivot: ModA with HHA Sit to stand: Mod A  Stand to sit: Mod A  Stand pivot:  Mod A with HHA Independent   Ambulation   A few steps to chair with ModA with HHA 5 feet x 3 reps with Mod A HHA >400 feet Independently    Stair negotiation: ascended and descended NT NT >4 steps with 1 rail Mod Independent   ROM BUE:  Defer to OT note  BLE:  WFL     Strength BUE:  Defer to OT note  BLE:  4+/5  Increase by 1/3 MMT grade   Balance Sitting EOB:  [place] dynamic  Dynamic Standing:  ModA with HHA Sitting EOB:  SBA dynamic  Dynamic Standing: Mod A with HHA Sitting EOB:  Independent  Dynamic Standing:  Independent     Pt is A & O x 4  CAM-ICU: NT  RASS: 0  Sensation:  No reported paresthesias   Edema:  None    Vitals:  Heart Rate at rest 84 bpm Heart Rate post session 85 bpm   SpO2 at rest 99% SpO2 post session 99%   Blood Pressure at rest 144/87 mmHg Blood Pressure post session 150/106 mmHg       Functional Status Score-Intensive Care Unit (FSS-ICU)   Rolling -/7   Supine to sit transfer 3/7   Unsupported sitting  5/7   Sit to stand transfers 3/7   Ambulation 1/7   Total  12/35       Therapeutic Exercises:    STS x4 reps  B marching in place x10 reps each leg     Patient education  Pt educated on safety    Patient response to education:   Pt verbalized understanding Pt demonstrated skill Pt requires further education in this area   trach yes yes     ASSESSMENT:    Conditions Requiring Skilled Therapeutic Intervention:    [x]Decreased strength     []Decreased ROM  [x]Decreased functional mobility  [x]Decreased balance   [x]Decreased endurance   [x]Decreased posture  []Decreased sensation  []Decreased coordination   []Decreased vision  []Decreased safety awareness   []Increased pain       Comments:  RN reported pt was medically stable. Pt was in bed upon arrival, agreeable to treatment. Pt completed supine>sit with assistance of trunk. Reported need to use BSC. RN assisted with line management. Transferred bed to [facility] with HHA. Left under RN care. Returned a little later in AM.  Pt sitting in chair. Complete multiple STS from chair. Completed ambulation fwd/bwd limiting by length of medical lines at this time. Completed marching in place. Pt was left in chair with all needs met and call light in reach. All lines remained intact. RN aware.     Treatment:  Patient practiced and was instructed in the following treatment:    Bed mobility training - pt given verbal and tactile cues to facilitate proper sequencing and safety during supine>sit as well as provided with physical assistance. Sitting EOB for >5 minutes for upright tolerance, postural awareness and BLE ROM  Transfer training - pt was given verbal and tactile cues to facilitate proper hand placement, technique and safety during sit to stand, stand to sit and stand pivot transfers as well as provided with physical assistance. Gait training- pt was given verbal and tactile cues to facilitate safety and balance during ambulation as well as provided with physical assistance. PHYSICAL THERAPY PLAN OF CARE:  Pt is making good progress towards established goals. Continue PT POC.      Specific instructions for next treatment:  Progress activity    Time in  0905  Time out  0915  Time in 0943  Time out 1002    Total Treatment Time  29 minutes     CPT codes:  [] Low Complexity PT evaluation 29326  [] Moderate Complexity PT evaluation 28316  [] High Complexity PT evaluation 86817  [] PT Re-evaluation 23046  [] Gait training 01482 - minutes  [] Manual therapy 24792 - minutes  [x] Therapeutic activities 35845 29 minutes  [] Therapeutic exercises 46506 - minutes  [] Neuromuscular reeducation 66876 - minutes     Simon Wiggins, PT, DPT  [id number]

## 2022-11-16 NOTE — PROGRESS NOTES
Speech Language Pathology      NAME:  Ronan Banks  :  1955  DATE: 2022  ROOM:  3806/3806-A    Spoke with ENT team, plan to maintain voice rest for now for Pt. SLP returned to Pt's room to review with Pt and his wife at bedside. All parties expressed understanding and no questions regarding PMV at this time time. Pt and wife reminded of Text to Speech marisel that is available and useful. Pt had note written on board communicating his concerns regarding swallowing. SLP provided edu to Pt and wife r/t process for swallow rehab with all questions answered. Pt understanding he is strict NPO at this time until cleared by ENT, then we will progress appropriately.      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

## 2022-11-16 NOTE — PROGRESS NOTES
11/15/22 1949   Oxygen Therapy/Pulse Ox   O2 Therapy Oxygen humidified   O2 Device Trach mask   O2 Flow Rate (L/min) (S)  5 L/min   FiO2  (S)  28 %   Heart Rate 95   Resp 21   SpO2 100 %   Pulse Oximeter Device Mode Continuous   Pulse Oximeter Device Location Finger

## 2022-11-16 NOTE — PROGRESS NOTES
SURGICAL INTENSIVE CARE  PROGRESS NOTE    Date of admission:  11/14/2022  Reason for ICU transfer:  critical care after ulnar forearm free flap    SUBJECTIVE:  No acute events overnight. On 5L via trach mask. Answers questions by writing on white board. Sitting up in chair. Some pain in the left arm. No nausea or vomiting. Tolerating TTF. 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 Had PEG tube placed last week. Flap checks q 30 min. 11/15/22 - Agitation is improved during the day. Start trickle tube feeds. PRN Ativan. Wean Precedex. PHYSICAL EXAM:  BP (!) 158/89   Pulse (!) 102   Temp 99 °F (37.2 °C) (Axillary)   Resp 16   Ht 5' 10\" (1.778 m)   Wt 235 lb 14.3 oz (107 kg)   SpO2 99%   BMI 33.85 kg/m²     GENERAL:  NAD. A&O x 3. HENT:  Normocephalic, atraumatic. Doppler with arterial waveform. Flap is pink without increased swelling or signs of venous congestion. Normal capillary refill. Incision sites intact. NECK: Incisions clean, moist, intact. 6.0 cuffed Shiley trach midline, intact with minimal bloody secretions. EYES:   No scleral icterus. PERRLA. LUNGS:  No increased work of breathing. CARDIOVASCULAR: Warm throughout. Regular rate  ABDOMEN:  Soft, non distended, non tender. No guarding, rigidity, rebound. EXTREMITIES:   Flap donor site with splint in place to left forearm. Motor, capillary refill and sensation intact distally. Suresite in place over left thigh STSG donor site. MAEx4. Atraumatic. No LE edema. SKIN:  Warm and dry  NEUROLOGIC:  GCS 15    All drains holding suction with SS output.        ASSESSMENT / PLAN:  69 y.o. 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 Fentanyl and oxycodone PRN. Scheduled tylenol. Wean as tolerated. CV: Discontinue arterial line POD1. Maintain systolic blood pressure between 100-160. Continue home Lopressor. Keep MAP greater than 70. Pulm: Wean O2 as able. Humidified air at all times. Trach suction Q2H to prevent mucus plug and per protocol. Maintain cuff inflation for 2 days postoperatively. No tracheostomy ties  GI: Advance trickle tube feeds today. PPI. Bowel regimen. Dietary consult. Zofran PRN. Renal: No acute issues. Monitor UOP & Electrolytes. Endocrine: Decadron 8 mg Q8H 3 days postoperatively followed by taper. Monitor glucose. MSK: S/p left hemiglossectomy with left ulnar forearm free flap reconstruction. Check flap appearance and doppler signals q1hr. Call ENT resident if changes appear   Heme: SCDs while in bed & SubQ Heparin 5000 TID for DVT prophylaxis and assist flap perfusion. Avoid blood transfusion if able. ID: [email or web address] Q6H while drains are in place     Pain/Analgesia:         Tylenol, oxycodone, fentanyl  Total fluid rate:          75 mL/hr  Bowel regimen:         senokot  DVT proph:                SCD's, subcutaneous heparin  Mouth/eye care:        As needed per patient  Urine: Terrell removed 11/16  Ancillary consults:    ENT  Family Update:          As available     Disposition:   SICU  Electronically signed by Marielle Eugene DO on 11/16/2022 at 5:32 AM     Attending Supervising Physician's [address] Statement  I performed a history and physical examination on the patient and discussed the management with the resident physician. I reviewed and agree with the findings and plan as documented in her note . No new issues. Patient in ICU for frequent flap checks.   No ICU needs otherwise, will s/o, we are available if needed    Electronically signed by Errol Sandoval MD on 11/16/22 at 4:10 PM EST

## 2022-11-16 NOTE — HOME CARE
[facility] received referral for home care for new trach and peg tube. Following for specific TF orders to run cost to review with wife. Will need all trach supplies delivered to patient's home prior to start of home care visit. Will need HHC orders placed. Awaiting return call from wife to verify demos. Eli Zhang LPN [address]    Ordered therapy at time of quote: twocal hn @ 45 ml/hr (~5 CANS/DAY) + protein modulator  Coverage: WILL BE COVEREDED @ 80% IF SWITCHED OVER TO BOLUS  Total pt responsibility (after deductible met): $2.94/DAY  Spoke with patient's wife Jeromy Frazier. Verified demos and the above tenative cost. She is agreeable.   Macy Linn [facility]

## 2022-11-16 NOTE — PROGRESS NOTES
Speech Language Pathology  COMMUNICATION and DYSPHAGIA  CONSULTATION NOTE    PATIENT NAME:  Godwin Rock  (male)     MRN:  [id number]    :  1955  (69 y.o.)  STATUS:  Inpatient: Room 3806/3806-A    TODAY'S DATE:  2022    SLP eval and treat  Start:  22,   End:  22,   ONE TIME,   Standing Count:  1 Occurrences,   R         Alba Ledezma,    REASON FOR REFERRAL: assess comm and swallow function    EVALUATING THERAPIST: WENDY Gonzalez     PRE-HABILITATION COMPLETED? no    SURGERY: left hemiglossectomy, left neck dissection and left ulnar forearm free flap reconstruction with Finesse Pressley and Wanda  SURGERY DATE: 22    SPECIFIC ENT INSTRUCTIONS: cuff inflated through post op day 2; voice rest for now. Will await orders from ENT for PMV placement and use/training. Conditions Requiring Communication and Dysphagia intervention: trach, left hemiglossectomy and neck dissection    COMMUNICATION    MOTOR SPEECH       Oral Peripheral Examination   Reduced ROM s/p surgery         RESPIRATORY    tracheostomy with supplemental oxygen    Patient seen upright in chair. no amount of copious secretions noted from trach site. Currently has Shiley # 6,  cuffed, non-fenestrate trach tube in place with trach mask. Discussed voice use (currently on voice rest per ENT) - no shouting/ whispering however normal conversational speech is recommended. Instruction provided to limit extended conversation. Training and feedback to be provided for pitch glides to promote laryngeal movement and reduce scar tissue, as appropriate. Education provided to refrain from speaking around trach as it puts unnecessary strain on larynx, vocal folds, and suture lines. Provided edu to Pt r/t use of AAC apps, such as Text to Speech, to facilitate increased communication. SWALLOW FUNCTION     Pt with PEG placed.  SLP reiterated importance of maintaining NPO at this time until cleared by ENT to allow for appropriate healing of suture lines and reduced risk of infection. Will plan to complete clinical swallow eval and/or MBSS per ENT orders, once cleared for PO intake. SLP and Pt discussed how it is imperative for use of pharyngeal/ laryngeal muscles during radiation for HNC treatment to prevent muscle disuse atrophy which may negatively impact rehab post radiation therapy, once cleared for PO intake. Information provided to Pt regarding prepared modified textures and liquids, post MBSS/ clearance for PO intake is obtained. Rationale for Web Designed Rooms Protocol for Pt's with adequate mobility and pulmonary status, in combination with good/ frequent oral care, thin water is more easily cleared from lungs and thus reducing risk of aspiration pneumonia. Aspiration is likely in this population, however research indicates that importance of use of laryngeal/ pharyngeal musculature is imperative for rehab and overall cognitive and physical status allows for increased ability for our respiratory system to compensate. Reviewed aspiration precautions and oral care- to be completed per ENT orders at this time. . Encouraged patient and/or family to engage SLP in unstructured Q&A session relative to identified deficit areas; indicated understanding of all information provided via satisfactory verbal response. PLAN OF CARE:    Will initiate communication and dysphagia therapy to target the following goals:    1. Modified barium swallow study as deemed appropriate by the managing physician     2. Patient will complete oral motor strength/coordination exercises to improve bolus prep/control and mastication with minimal verbal prompts. 3. Patient will complete prehabilitation exercise program to begin immediately consisting of jaw exercises, Kaylyn maneuver, pitch glides and Shaker exercise. He was able to complete all exercises given verbal/visual cues.   Detailed descriptions, examples, and hand-outs of each exercise provided. 4. Pt will demonstrate ability to utilize finger occlusion of trach to increase communication until PMV is placed with minimal cues in 80% of attempts with minimal assist. (Pending clearance from ENT)    Will advance goals initiate formal dysphagia therapy in the outpatient setting if warranted during his course of treatment. Pt did not previously complete pre-habilitation. Reviewed importance of participation in ongoing speech-language pathology intervention. Will continue Speech therapy at next level of care for above stated goals.      10416  eval speech sound lang comprehension   19427  bedside swallow eval    Mary Needle., [address]  [id number]  11/16/2022

## 2022-11-16 NOTE — PROGRESS NOTES
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]     Surgery/Procedures:  MOUTH LESION BIOPSY EXCISION,NECK DISSECTION RADICAL,FREE FLAP GRAFT NECK DISSECTION-ROBOTIC         Pertinent Medical History:    Past Medical History        Past Medical History:   Diagnosis Date    Acid reflux disease      Cancer of oral cavity ([facility] 10/14/2022    Hyperlipidemia      Hypertension              *Precautions:  Fall risk, maintain NWB LUE s/p flap until further clarified, CARLITOS x 3, O2 via trach mask, PEG     Assessment of current deficits   [x] Functional mobility          [x]ADLs           [x] Strength                  []Cognition   [x] Functional transfers        [x] IADLs         [x] Safety Awareness   [x]Endurance   [x] Fine Coordination           [x] ROM           [] Vision/perception    []Sensation     [x]Gross Motor Coordination [x] Balance    [] Delirium                  [x]Motor Control     [x] Communication     OT PLAN OF CARE   OT POC based on physician orders, patient diagnosis and results of clinical assessment.         Frequency/Duration: 1-3 days/wk for 1-2 weeks PRN    Specific OT Treatment to include:   ADL retraining/adapted techniques and AE recommendations to increase functional independence within precautions                    Energy conservation techniques to improve tolerance for selfcare routine   Functional transfer/mobility training/DME recommendations for increased independence, safety and fall prevention         Patient/family education to increase safety and functional independence within precautions              Environmental modifications for safe mobility and completion of ADLs                           Sensory re-education techniques to improve extremity awareness, maintain skin integrity and improve hand function                             Splinting/positioning needs to maintain joint/skin integrity and prevent contractures  Therapeutic activity to improve functional performance during ADLs/IADLs                                         Therapeutic exercise to improve tolerance and functional strength for ADLs   Balance retraining exercises/tasks for facilitation of postural control with dynamic challenges during ADLs . Positioning to improve functional independence     Recommended Adaptive Equipment: TBA      Home Living: Pt lives with wife/family  in a 1 floor plan with 2 step(s) to enter and 1 rail(s); bed/bath on first floor  Bathroom setup: walk in shower with small step  Equipment owned: no DME     Prior Level of Function: IND with ADLs;  IND with IADLs. No device for ambulation. Driving: yes  Occupation: retired     Pain Level: pt c/o 1/10 surgical pain  this session     Cognition: A&O: 4/4    Follows 1-2 step commands appropriately. Memory: Fair             Comprehension Fair             Problem solving: Fair             Judgement/safety: Fair                Communication skills: grossly WFL (using white board & gestures to communicate)             Vision: grossly WFL                    Glasses:yes (not present)                                                       Hearing: WFL               RASS: 0  CAM-ICU: (NT) Delirium     UE Assessment:  Hand Dominance: Right [x]  Left []       ROM Strength   RUE  WFL Grossly WFL   LUE WFL Grossly WFL      Sensation: No c/o numbness/tingling in extremities. Tone:  WNL Edema: [facility]    Functional Assessment:  AM-PAC Daily Activity Raw Score: 11/24    Initial Eval Status  Date: 11/15/22 Treatment Status  Date:11/16/22 STGs = LTGs  Time frame: 7-14 days   Feeding NGT  NGT  Re-assess when indicated      Grooming Max A  Mod A  Seated   (Limited by medical lines)                       Min A   while standing sink level requiring AD as needed for balance and demonstrating G tolerance       UB dressing/bathing Max A  Max A  (Limited by medical lines)                       Min A         LB dressing/bathing Dep  Mod A overall  seated up in chair;   Pt able to charles pants to knee level with Min A; doffed/donned socks using figure 4 technique with SBA  *pt using L UE as fair functional assist                       Min A   using AE as needed for safe reach/ energy conservation        Toileting NT  Max A                       Min A      Bed Mobility  Supine to sit: Max A     Sit to supine:   NT Supine to sit:   Mod A with min cues for technique  Sit to supine:   NT                        Min A  in prep of ADL tasks & transfers   Functional Transfers Sit to stand: Mod A     Stand to sit:   Mod A  Sit to stand: Mod A     Stand to sit:   Mod A  (lower chair)    SPT: Mod A     Commode: Min A  (3in1- higher surface)                       S  sit<>stand/functional bathroom transfers using AD/DME as needed for balance and safety   Functional Mobility Mod A  SPT to chair Mod A   No device; unsteady                       S   functional/bathroom mobility using AD as needed & demonstrating G safety      Balance Sitting:     Static:  SBA    Dynamic:Min A  Standing: Mod A Sitting:     Static:  SBA    Dynamic:Min A  Standing: ModA S dynamic sitting balance; S dynamic standing balance  during ADL tasks & transfers   Endurance/  Activity Tolerance    F tolerance with light  activity. F tolerance with light  to moderate activity.   G   tolerance with moderate activity/self care routine   Visual/  Perceptual [facility]                         Vitals:   HR at rest: 96 bpm HR at end of session: 80 bpm   Spo2 at rest:98% Spo2 at end of session: 95%   BP at rest: 144/87 mmHg BP at end of session: 150/106 mmHg     Treatment: OT treatment provided this date:  Bed mobility: Review of precautions prior to bed mobility to facilitate safe transfers and ADLS. Pt requires mod cues for safe technique. No c/o dizziness EOB. Balance retraining: Performed sitting/standing balance ex's with instruction to facilitate righting reactions with postural changes during ADLS. Pt balance improving with activity. Pt may benefit from use of device pending progress. ADL retraining: Instruction on adapted techniques while seated to increase independence and safe reach during dressing/bathing activities. Pt demonstrated G understanding. Energy conservation: Education on breathing techniques, pacing, work simplification strategies & recommended bathroom DME for safety and energy conservation during self care tasks and activities of daily living. Delirium Prevention: Environmental and sensory modifications assessed and implemented to decrease ICU acquired delirium and to improve overall orientation, mentation and pt interaction with family/staff. Line management and environmental modifications made prior to and end of session to ensure patient safety and to increase efficiency of session. Skilled monitoring of HR, O2 saturation, blood pressure and patient's response to activity performed throughout session. Comments: OK from RN to see patient. Upon arrival, patient supine in bed, requesting to use . Pt demo fair tolerance with G understanding of education/techniques. At end of session, patient left seated in chair to increase activity tolerance . Call light within reach, all lines and tubes intact. Pt instructed on use of call light for assistance and fall prevention. Overall, pt presents with decreased activity tolerance, dynamic balance, functional mobility limiting completion of ADLs and safe return home. Pt can benefit from continued skilled OT to increase safety, functional independence & quality of life. Pt has made Good progress towards set goals.    Continue with current plan of care       Time In:0910; 0945              Time Out: 0918;1000         Total Treatment Time: 23      Treatment Charges: Mins Units   Ther Ex  [id number]     Manual Therapy [id number]     Thera Activities 68179 8 1   ADL/Home Mgt 27526 15 1   Neuro Re-ed 51465     Orthotic manage/training  60590     Non-Billable Time         Roberta Brandt, OTR/L 6744

## 2022-11-16 NOTE — PROGRESS NOTES
Flap checks completed from 1600 to 1900 q1 hr per order. Pulse is audible via doppler, warm to touch, pale to pink in color. Patient has no complaints at this time.

## 2022-11-16 NOTE — CARE COORDINATION
Pod #2. Pt tolerating 28% trach mask. Met with pt and wife in room. Pt sitting up in chair, in good spirits. They live in a 2 story condo with 2 nirmal and a rail, but bed and bath are on the 1st floor. Pt goes to the basement to exercise. Pt currently has a #6 uncuffed trach in place. Wife states she has been caring for peg site since it was placed last week. She is willing to learn care and suctioning of trach also. Wife states she was informed that trach may be capped by the time he is ready for discharge. I explained that we will still teach her how to suction and care for the trach while here. Speech therapy will be consulted for speaking valve closer to discharge. Offered hhc and dme choices. Choices are [facility]. Referral made to Leah at  [facility] to follow. Informed her pt will be on tf at discharge in addition to trach care. Will need HHC orders for skilled nursing , graft site monitoring,and orders for tf at discharge. Referral called to Andressa at [facility]. She is requesting [address] script when pt closer to discharge. DME supplies will need to be delivered to the home prior to start of hhc.

## 2022-11-16 NOTE — PROGRESS NOTES
Comprehensive Nutrition Assessment    Type and Reason for Visit:  Initial, Consult (Tf recs)    Nutrition Recommendations/Plan:     Continue NPO, Modify Tube Feeding/ Advance per ENT protocol:    Goal Rec to meet 100% est calorie & protein needs:   2.0 Calorie (Fluid Restricted) @ 45 ml/hr + 1 protein modular daily  Will provide: 1080 ml tv, 2160 kcals, 90 gm pro (2260 kcals & 116 gm pro w/ mod), 756 ml free water  Water flushes per critical care team management        Malnutrition Assessment:  Malnutrition Status: At risk for malnutrition (11/16/22 1137)    Context:  Acute Illness     Findings of the 6 clinical characteristics of malnutrition:  Energy Intake:  Mild decrease in energy intake  Weight Loss:  No significant weight loss (x 1 month, unable to assess longterm)     Body Fat Loss:  No significant body fat loss     Muscle Mass Loss:  No significant muscle mass loss    Fluid Accumulation:  No significant fluid accumulation     Strength:  Not Performed    Nutrition Assessment:    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 Noted PEG placed last week. EN support trickle feeds initiated, will provide goal TF rec when needed. Advancing per ENT protocol.     Nutrition Related Findings:    Pt alert, trach mask, MAP WNL, +I/O's 3L, +2 facial edema, active BS, PEG w/ trickle feeds, closed suction drains x 3, closed suction drains x 3     Wound Type: Multiple, Surgical Incision (x 4 sites)       Current Nutrition Intake & Therapies:    Average Meal Intake: NPO     Current Tube Feeding (TF) Orders:  Feeding Route: PEG  Formula: 2.0 Calorie  Schedule: Continuous  Feeding Regimen: 40 ml/hr, running @ 20 ml/hr advancing  Water Flushes: 30 ml q 4 hr= 180 ml water  Current TF & Flush Orders Provides: @ 20 ml/hr= 480 ml tv, 960 kcals, 40 gm pro, 336 ml free water, 516 ml total water w/ flushes  Goal TF & Flush Orders Provides: @ 40 ml/hr= 960 ml tv, 1920 kcals, 80 gm pro, 672 ml free water, 852 ml total water w/ flushes    Anthropometric Measures:  Height: 5' 10\" (177.8 cm)  Ideal Body Weight (IBW): 166 lbs (75 kg)    Admission Body Weight: 225 lb (102.1 kg) (11/7 standing scale from [facility])  Current Body Weight: 224 lb 6.4 oz (101.8 kg) (11/16 measured), 135.2 % IBW. Current BMI (kg/m2): 32.2  Usual Body Weight: 228 lb 11.2 oz (103.7 kg) (EMR measured office visit 10/14/22, no further wt hx on file)  % Weight Change (Calculated): -1.9, wt stable x 1 mon                    BMI Categories: Obese Class 1 (BMI 30.0-34. 9)    Estimated Daily Nutrient Needs:  Energy Requirements Based On: Formula  Weight Used for Energy Requirements: Current  Energy (kcal/day): MSJ 1790 x 1.2 SF= 9377-1143  Weight Used for Protein Requirements: Ideal  Protein (g/day): 1.5-1.8 g/kg IBW; 115-135  Fluid (ml/day): per critical care    Nutrition Diagnosis:   Inadequate oral intake related to altered GI function (Oral CA) as evidenced by NPO or clear liquid status due to medical condition, nutrition support - enteral nutrition    Nutrition Interventions:   Nutrition Education/Counseling: Education not indicated  Coordination of Nutrition Care: Continue to monitor while inpatient       Goals:     Goals:  Tolerate nutrition support at goal rate       Nutrition Monitoring and Evaluation:      Food/Nutrient Intake Outcomes: Enteral Nutrition Intake/Tolerance  Physical Signs/Symptoms Outcomes: Biochemical Data, Nutrition Focused Physical Findings, Skin, Weight, GI Status, Fluid Status or Edema, Hemodynamic Status    Discharge Planning:    Enteral Nutrition     Carlos Shelley RD, LD  Contact: Ext [phone]

## 2022-11-16 NOTE — PROGRESS NOTES
DEPARTMENT OF OTOLARYNGOLOGY - HEAD & NECK SURGERY   PROGRESS NOTE    PATIENT: Shannon Teague ADMIT DXRA:[id number]   ROOM/BED: [facility]174[facility] TODAY:November 12, 2022     SUBJECTIVE:    No acute event overnight  Alert and oriented  Pain is well controlled  Denies n/v/f/c  Tube feeds at 10cc/hr  Hold off on ativan as causing some confusion    Patient's past medical and surgical history, medications, allergies, labs and imagings were reviewed. OBJECTIVE:                                                                                                                              Physical Exam  Constitutional: Appears awake, alert, cooperative, no apparent distress   Eyes: Lids and lashes normal, pupils equal, round and reactive to light,extra ocular muscles intact, sclera clear, conjunctiva normal   HENT: Doppler sounds with arterial waveform at marked site. Flap appears pink without any increased swelling or signs of venous congestion. Soft, warm to touch with normal capillary refills. Incisions are clean, moist and intact. Neck:  Supple, soft and flat without evidence of hematoma or fluid collection. Incisions clean, moist and intact. Shiley 6-0 cuffed trach midline and intact. Mild frothy secretions    Extremities Flap donor extremity warm with normal capillary refills, strong distal pulses, intact sensation and movement. No cyanosis or edema. Skin graft donor site with tegaderm/Suresite in place   Cardiovascular: Regular rate   Neurologic:  Cranial nerves II-XII are grossly intact. Marginal Mandibular nerve weak, HB III/IV     Drains: All drains holding suction with serosanguinous output (stripped)  Drain (L lateral neck): 35 cc/24h  Drain (L medial neck): 175 cc/24h  Drain (L arm): 65 cc/24h     Output by Drain (mL) 11/14/22 0701 - 11/14/22 1900 11/14/22 1901 - 11/15/22 0700 11/15/22 0701 - 11/15/22 1900 11/15/22 1901 - 11/16/22 0700 11/16/22 0701 - 11/16/22 0732   Closed/Suction Drain Left; Anterior Neck Bulb 40 35 30    Closed/Suction Drain Left; Anterior Neck Bulb  70 55 120    Closed/Suction Drain Left; Anterior Elbow Bulb   15 20           Vitals:    11/16/22 0400 11/16/22 0500 11/16/22 0600 11/16/22 0700   BP: (!) 172/100 (!) 157/80 (!) 145/84 (!) 155/77   Pulse: (!) 106 (!) 108 (!) 108 (!) 104   Resp: 21 15 22 18   Temp: 99.8 °F (37.7 °C)  98.9 °F (37.2 °C)    TempSrc: Axillary  Axillary    SpO2: 100% 98% 100% 98%   Weight:   224 lb 6.4 oz (101.8 kg)    Height:         24 HR INTAKE/OUTPUT:    Intake/Output Summary (Last 24 hours) at 11/16/2022 0732  Last data filed at 11/16/2022 9755  Gross per 24 hour   Intake 2825.65 ml   Output 3985 ml   Net -1159.35 ml       8HR INTAKE OUTPUT:  In: 654.7 [I.V.:554.7]  Out: -     Recent Results (from the past 24 hour(s))   CBC with Auto Differential    Collection Time: 11/16/22  5:00 AM   Result Value Ref Range    WBC 13.6 (H) 4.5 - 11.5 E9/L    RBC 3.53 (L) 3.80 - 5.80 E12/L    Hemoglobin 10.4 (L) 12.5 - 16.5 g/dL    Hematocrit 30.3 (L) 37.0 - 54.0 %    MCV 85.8 80.0 - 99.9 fL    MCH 29.5 26.0 - 35.0 pg    MCHC 34.3 32.0 - 34.5 %    RDW 12.8 11.5 - 15.0 fL    Platelets 021 981 - 774 E9/L    MPV 10.5 7.0 - 12.0 fL    Neutrophils % 89.4 (H) 43.0 - 80.0 %    Immature Granulocytes % 0.5 0.0 - 5.0 %    Lymphocytes % 3.6 (L) 20.0 - 42.0 %    Monocytes % 6.4 2.0 - 12.0 %    Eosinophils % 0.0 0.0 - 6.0 %    Basophils % 0.1 0.0 - 2.0 %    Neutrophils Absolute 12.18 (H) 1.80 - 7.30 E9/L    Immature Granulocytes # 0.07 E9/L    Lymphocytes Absolute 0.49 (L) 1.50 - 4.00 E9/L    Monocytes Absolute 0.87 0.10 - 0.95 E9/L    Eosinophils Absolute 0.00 (L) 0.05 - 0.50 E9/L    Basophils Absolute 0.01 0.00 - 0.20 E9/L    Poikilocytes 1+     Ovalocytes 1+    Comprehensive Metabolic Panel    Collection Time: 11/16/22  5:00 AM   Result Value Ref Range    Sodium 143 132 - 146 mmol/L    Potassium 3.8 3.5 - 5.0 mmol/L    Chloride 106 98 - 107 mmol/L    CO2 24 22 - 29 mmol/L    Anion Gap 13 7 - 16 mmol/L Glucose 144 (H) 74 - 99 mg/dL    BUN 17 6 - 23 mg/dL    Creatinine 0.8 0.7 - 1.2 mg/dL    Est, Glom Filt Rate >60 >=60 mL/min/1.73    Calcium 9.2 8.6 - 10.2 mg/dL    Total Protein 5.9 (L) 6.4 - 8.3 g/dL    Albumin 3.5 3.5 - 5.2 g/dL    Total Bilirubin 0.4 0.0 - 1.2 mg/dL    Alkaline Phosphatase 61 40 - 129 U/L    ALT 31 0 - 40 U/L    AST 75 (H) 0 - 39 U/L   Magnesium    Collection Time: 11/16/22  5:00 AM   Result Value Ref Range    Magnesium 2.0 1.6 - 2.6 mg/dL   Phosphorus    Collection Time: 11/16/22  5:00 AM   Result Value Ref Range    Phosphorus 3.3 2.5 - 4.5 mg/dL   Calcium, Ionized    Collection Time: 11/16/22  5:00 AM   Result Value Ref Range    Calcium, Ionized 1.24 1.15 - 1.33 mmol/L       Problem List Items Addressed This Visit          Digestive    Cancer of oral cavity (HCC)    Relevant Medications    acetaminophen (TYLENOL) 160 MG/5ML solution 650 mg    LORazepam (ATIVAN) tablet 1 mg    oxyCODONE (ROXICODONE) 5 MG/5ML solution 5 mg    fentaNYL (SUBLIMAZE) injection 50 mcg    Other Relevant Orders    Surgical Pathology    Surgical Pathology    Surgical Pathology    Surgical Pathology    Surgical Pathology    Surgical Pathology       Other    Tongue mass    Relevant Orders    Surgical Pathology    Surgical Pathology    Surgical Pathology    Surgical Pathology    Surgical Pathology    Surgical Pathology     Other Visit Diagnoses       Pre-operative laboratory examination    -  Primary    Relevant Orders    CBC (Completed)    Type and Screen (Completed)    EKG 12 Lead    Culture, MRSA, Screening (Completed)               ASSESSMENT:  69 y.o.male with history of oral cavity SCC s/p left hemiglossectomy, left neck dissection and left ulnar forearm free flap reconstruction with Finesse Lee and Ghassan Gutierrez on 11/14/22. PLAN:   Flap: Continue Q1H flap checks with doppler per nursing. Flap donor extremity must be elevated at all times.  Please call on-call ENT resident for any change in color in flap color, doppler sounds, or increased swelling/firmness at the related flap or donor site. Avoid intraoral suctioning or manipulating of the tongue. CV: Discontinue arterial line POD1 - done. Maintain systolic blood pressure between 100-160. Keep MAP greater than 70. Resp: Wean O2 as able. Humidified air at all times. Trach suctions Q2H to prevent mucus plug and per protocol. Analgesia: Scheduled Tylenol. PCA, transition to Roxicodone through the NGT/PEG as tolerated  GI: PPI (Peptamen if fish allergy), bowel regimen, dietary consult, POD#1 (Wait 24 hrs) begin trickle feeds If bowel sounds present prefer 2cal HN at 10 cc/hr for 24 hrs, increase rate by 5-10ml/hour Q6hrs as tolerated to protect suture lines from reflux   Advancing tube feed protocol: check residual Q4H. If >300cc, stop TF for 1-4h and recheck residual  If 200cc-300, half the current rate  If 100cc-200, maintain current rate and recheck residual Q4H  If <100cc, advance TF as above  : Maintain nettles, discontinue POD2  ID: Continue Unasyn Q6H while drains are in place; continue Decadron 8 mg Q8H 3 days postoperatively . Drains: Monitor output, stripping with flap checks  Embolic PPX: SCDs while in bed & SubQ Heparin 5000 TID for DVT prophylaxis and assist flap perfusion    PT/OT: encourage out of bed to chair, ambulate as tolerated  Medical management per medicine/SICU, appreciate care  Speech therapy - hold off on speaking valve and speaking, voice rest, social work, [facility], consults ordered  Dispo: Continue SICU care for least 48 hours post operatively. Patient seen and examined. Plans discussed with attending physician.       Aj Blandon DO  Resident Physician  Otolaryngology  Melita Alvarez 1945 11/16/2022  7:32 AM

## 2022-11-16 NOTE — PROGRESS NOTES
ENT Flap Check     Patient doing well. No respiratory distress, currently on 5L via trach mask Able to communicate appropriately. Flap appears well perfused, good capillary refill, no evidence of venous congestion or hematoma. Intraoral doppler checks show good triphasic waves.      Will continue to monitor vitals closely  Monitor pain control  Continue Q 1 hour nursing flap checks     Call ENT with any concerns or questions

## 2022-11-16 NOTE — PROGRESS NOTES
TIME PULSE/FLAP  ASSESSMENT   2000 DONE Warm   2100 DONE Warm   2200 DONE Warm   2300 DONE Warm   0000 DONE Warm   0100 DONE Warm   0200 DONE Warm   0300 DONE Warm   0400 DONE Warm   0500 DONE Warm   0600 DONE Warm   0700 DONE Warm

## 2022-11-17 PROBLEM — Z93.0 TRACHEOSTOMY DEPENDENCE (HCC): Status: ACTIVE | Noted: 2022-11-17

## 2022-11-17 LAB
ALBUMIN SERPL-MCNC: 3.5 G/DL (ref 3.5–5.2)
ALP BLD-CCNC: 69 U/L (ref 40–129)
ALT SERPL-CCNC: 63 U/L (ref 0–40)
ANION GAP SERPL CALCULATED.3IONS-SCNC: 8 MMOL/L (ref 7–16)
AST SERPL-CCNC: 92 U/L (ref 0–39)
BASOPHILS ABSOLUTE: 0.01 E9/L (ref 0–0.2)
BASOPHILS RELATIVE PERCENT: 0.1 % (ref 0–2)
BILIRUB SERPL-MCNC: 0.4 MG/DL (ref 0–1.2)
BUN BLDV-MCNC: 14 MG/DL (ref 6–23)
CALCIUM IONIZED: 1.31 MMOL/L (ref 1.15–1.33)
CALCIUM SERPL-MCNC: 9.1 MG/DL (ref 8.6–10.2)
CHLORIDE BLD-SCNC: 104 MMOL/L (ref 98–107)
CO2: 29 MMOL/L (ref 22–29)
CREAT SERPL-MCNC: 0.9 MG/DL (ref 0.7–1.2)
EOSINOPHILS ABSOLUTE: 0 E9/L (ref 0.05–0.5)
EOSINOPHILS RELATIVE PERCENT: 0 % (ref 0–6)
GFR SERPL CREATININE-BSD FRML MDRD: >60 ML/MIN/1.73
GLUCOSE BLD-MCNC: 132 MG/DL (ref 74–99)
HCT VFR BLD CALC: 30.5 % (ref 37–54)
HEMOGLOBIN: 10.3 G/DL (ref 12.5–16.5)
IMMATURE GRANULOCYTES #: 0.06 E9/L
IMMATURE GRANULOCYTES %: 0.6 % (ref 0–5)
LYMPHOCYTES ABSOLUTE: 1.14 E9/L (ref 1.5–4)
LYMPHOCYTES RELATIVE PERCENT: 12.1 % (ref 20–42)
MAGNESIUM: 2 MG/DL (ref 1.6–2.6)
MCH RBC QN AUTO: 30 PG (ref 26–35)
MCHC RBC AUTO-ENTMCNC: 33.8 % (ref 32–34.5)
MCV RBC AUTO: 88.9 FL (ref 80–99.9)
MONOCYTES ABSOLUTE: 0.69 E9/L (ref 0.1–0.95)
MONOCYTES RELATIVE PERCENT: 7.3 % (ref 2–12)
NEUTROPHILS ABSOLUTE: 7.5 E9/L (ref 1.8–7.3)
NEUTROPHILS RELATIVE PERCENT: 79.9 % (ref 43–80)
PDW BLD-RTO: 12.6 FL (ref 11.5–15)
PHOSPHORUS: 3.1 MG/DL (ref 2.5–4.5)
PLATELET # BLD: 181 E9/L (ref 130–450)
PMV BLD AUTO: 10.3 FL (ref 7–12)
POTASSIUM SERPL-SCNC: 3.9 MMOL/L (ref 3.5–5)
RBC # BLD: 3.43 E12/L (ref 3.8–5.8)
SODIUM BLD-SCNC: 141 MMOL/L (ref 132–146)
TOTAL PROTEIN: 5.9 G/DL (ref 6.4–8.3)
WBC # BLD: 9.4 E9/L (ref 4.5–11.5)

## 2022-11-17 PROCEDURE — 97535 SELF CARE MNGMENT TRAINING: CPT

## 2022-11-17 PROCEDURE — 2580000003 HC RX 258: Performed by: ANESTHESIOLOGY

## 2022-11-17 PROCEDURE — 84100 ASSAY OF PHOSPHORUS: CPT

## 2022-11-17 PROCEDURE — 97530 THERAPEUTIC ACTIVITIES: CPT

## 2022-11-17 PROCEDURE — 6360000002 HC RX W HCPCS: Performed by: OTOLARYNGOLOGY

## 2022-11-17 PROCEDURE — 2580000003 HC RX 258

## 2022-11-17 PROCEDURE — 6370000000 HC RX 637 (ALT 250 FOR IP): Performed by: OTOLARYNGOLOGY

## 2022-11-17 PROCEDURE — 6370000000 HC RX 637 (ALT 250 FOR IP)

## 2022-11-17 PROCEDURE — 2700000000 HC OXYGEN THERAPY PER DAY

## 2022-11-17 PROCEDURE — 83735 ASSAY OF MAGNESIUM: CPT

## 2022-11-17 PROCEDURE — 99024 POSTOP FOLLOW-UP VISIT: CPT | Performed by: OTOLARYNGOLOGY

## 2022-11-17 PROCEDURE — 80053 COMPREHEN METABOLIC PANEL: CPT

## 2022-11-17 PROCEDURE — 36415 COLL VENOUS BLD VENIPUNCTURE: CPT

## 2022-11-17 PROCEDURE — 6370000000 HC RX 637 (ALT 250 FOR IP): Performed by: SURGERY

## 2022-11-17 PROCEDURE — 82330 ASSAY OF CALCIUM: CPT

## 2022-11-17 PROCEDURE — 6360000002 HC RX W HCPCS

## 2022-11-17 PROCEDURE — 2000000000 HC ICU R&B

## 2022-11-17 PROCEDURE — 85025 COMPLETE CBC W/AUTO DIFF WBC: CPT

## 2022-11-17 PROCEDURE — 2580000003 HC RX 258: Performed by: OTOLARYNGOLOGY

## 2022-11-17 RX ORDER — GUAIFENESIN 400 MG/1
400 TABLET ORAL 3 TIMES DAILY PRN
Status: DISCONTINUED | OUTPATIENT
Start: 2022-11-17 | End: 2022-11-21 | Stop reason: HOSPADM

## 2022-11-17 RX ORDER — LANSOPRAZOLE
30 KIT 2 TIMES DAILY
Status: DISCONTINUED | OUTPATIENT
Start: 2022-11-18 | End: 2022-11-21 | Stop reason: HOSPADM

## 2022-11-17 RX ORDER — HYDRALAZINE HYDROCHLORIDE 20 MG/ML
5 INJECTION INTRAMUSCULAR; INTRAVENOUS EVERY 4 HOURS PRN
Status: DISCONTINUED | OUTPATIENT
Start: 2022-11-17 | End: 2022-11-21 | Stop reason: HOSPADM

## 2022-11-17 RX ADMIN — ACETAMINOPHEN 650 MG: 160 SOLUTION ORAL at 06:22

## 2022-11-17 RX ADMIN — BACITRACIN ZINC: 500 OINTMENT TOPICAL at 15:19

## 2022-11-17 RX ADMIN — LANSOPRAZOLE 30 MG: KIT at 08:53

## 2022-11-17 RX ADMIN — HEPARIN SODIUM 5000 UNITS: 10000 INJECTION INTRAVENOUS; SUBCUTANEOUS at 13:46

## 2022-11-17 RX ADMIN — HEPARIN SODIUM 5000 UNITS: 10000 INJECTION INTRAVENOUS; SUBCUTANEOUS at 22:06

## 2022-11-17 RX ADMIN — TETRAHYDROZOLINE HCL 4 DROP: 0.05 SOLUTION/ DROPS OPHTHALMIC at 20:22

## 2022-11-17 RX ADMIN — AMPICILLIN SODIUM AND SULBACTAM SODIUM 3000 MG: 2; 1 INJECTION, POWDER, FOR SOLUTION INTRAMUSCULAR; INTRAVENOUS at 12:16

## 2022-11-17 RX ADMIN — SODIUM CHLORIDE, PRESERVATIVE FREE 10 ML: 5 INJECTION INTRAVENOUS at 09:19

## 2022-11-17 RX ADMIN — HEPARIN SODIUM 5000 UNITS: 10000 INJECTION INTRAVENOUS; SUBCUTANEOUS at 06:22

## 2022-11-17 RX ADMIN — SODIUM CHLORIDE, PRESERVATIVE FREE 10 ML: 5 INJECTION INTRAVENOUS at 20:22

## 2022-11-17 RX ADMIN — METOPROLOL TARTRATE 100 MG: 50 TABLET, FILM COATED ORAL at 08:53

## 2022-11-17 RX ADMIN — SODIUM CHLORIDE, PRESERVATIVE FREE 10 ML: 5 INJECTION INTRAVENOUS at 08:54

## 2022-11-17 RX ADMIN — TETRAHYDROZOLINE HCL 4 DROP: 0.05 SOLUTION/ DROPS OPHTHALMIC at 08:54

## 2022-11-17 RX ADMIN — AMPICILLIN SODIUM AND SULBACTAM SODIUM 3000 MG: 2; 1 INJECTION, POWDER, FOR SOLUTION INTRAMUSCULAR; INTRAVENOUS at 06:31

## 2022-11-17 RX ADMIN — OXYCODONE HYDROCHLORIDE 5 MG: 5 SOLUTION ORAL at 03:05

## 2022-11-17 RX ADMIN — ONDANSETRON 4 MG: 2 INJECTION INTRAMUSCULAR; INTRAVENOUS at 13:46

## 2022-11-17 RX ADMIN — ACETAMINOPHEN 650 MG: 160 SOLUTION ORAL at 17:44

## 2022-11-17 RX ADMIN — Medication 10 MG: at 20:21

## 2022-11-17 RX ADMIN — BACITRACIN ZINC: 500 OINTMENT TOPICAL at 06:33

## 2022-11-17 RX ADMIN — ACETAMINOPHEN 650 MG: 160 SOLUTION ORAL at 11:07

## 2022-11-17 RX ADMIN — ATORVASTATIN CALCIUM 10 MG: 20 TABLET, FILM COATED ORAL at 20:21

## 2022-11-17 RX ADMIN — AMPICILLIN SODIUM AND SULBACTAM SODIUM 3000 MG: 2; 1 INJECTION, POWDER, FOR SOLUTION INTRAMUSCULAR; INTRAVENOUS at 17:43

## 2022-11-17 RX ADMIN — GUAIFENESIN 400 MG: 400 TABLET ORAL at 20:21

## 2022-11-17 RX ADMIN — SODIUM CHLORIDE, POTASSIUM CHLORIDE, SODIUM LACTATE AND CALCIUM CHLORIDE: 600; 310; 30; 20 INJECTION, SOLUTION INTRAVENOUS at 20:23

## 2022-11-17 ASSESSMENT — PAIN DESCRIPTION - LOCATION
LOCATION: HEAD
LOCATION: ANKLE

## 2022-11-17 ASSESSMENT — PAIN DESCRIPTION - FREQUENCY: FREQUENCY: INTERMITTENT

## 2022-11-17 ASSESSMENT — PAIN DESCRIPTION - PAIN TYPE: TYPE: SURGICAL PAIN

## 2022-11-17 ASSESSMENT — PAIN DESCRIPTION - ORIENTATION: ORIENTATION: LEFT

## 2022-11-17 ASSESSMENT — PAIN - FUNCTIONAL ASSESSMENT: PAIN_FUNCTIONAL_ASSESSMENT: PREVENTS OR INTERFERES SOME ACTIVE ACTIVITIES AND ADLS

## 2022-11-17 ASSESSMENT — PAIN SCALES - GENERAL: PAINLEVEL_OUTOF10: 3

## 2022-11-17 ASSESSMENT — PAIN DESCRIPTION - DESCRIPTORS: DESCRIPTORS: SORE

## 2022-11-17 NOTE — PLAN OF CARE
Problem: Skin/Tissue Integrity  Goal: Absence of new skin breakdown  Description: 1. Monitor for areas of redness and/or skin breakdown  2. Assess vascular access sites hourly  3. Every 4-6 hours minimum:  Change oxygen saturation probe site  4. Every 4-6 hours:  If on nasal continuous positive airway pressure, respiratory therapy assess nares and determine need for appliance change or resting period.   11/17/2022 0930 by Josias Espinal RN  Outcome: Progressing     Problem: Safety - Adult  Goal: Free from fall injury  11/17/2022 0930 by Josias Espinal RN  Outcome: Progressing     Problem: ABCDS Injury Assessment  Goal: Absence of physical injury  11/17/2022 0930 by Josias Espinal RN  Outcome: Progressing     Problem: Pain  Goal: Verbalizes/displays adequate comfort level or baseline comfort level  11/17/2022 0930 by Josias Espinal RN  Outcome: Progressing

## 2022-11-17 NOTE — PLAN OF CARE
Problem: Discharge Planning  Goal: Discharge to home or other facility with appropriate resources  Outcome: Progressing     Problem: Skin/Tissue Integrity  Goal: Absence of new skin breakdown  Description: 1. Monitor for areas of redness and/or skin breakdown  2. Assess vascular access sites hourly  3. Every 4-6 hours minimum:  Change oxygen saturation probe site  4. Every 4-6 hours:  If on nasal continuous positive airway pressure, respiratory therapy assess nares and determine need for appliance change or resting period.   Outcome: Progressing     Problem: Safety - Adult  Goal: Free from fall injury  Outcome: Progressing     Problem: ABCDS Injury Assessment  Goal: Absence of physical injury  Outcome: Progressing     Problem: Pain  Goal: Verbalizes/displays adequate comfort level or baseline comfort level  Outcome: Progressing     Problem: Neurosensory - Adult  Goal: Achieves maximal functionality and self care  Outcome: Progressing     Problem: Cardiovascular - Adult  Goal: Maintains optimal cardiac output and hemodynamic stability  Outcome: Progressing     Problem: Skin/Tissue Integrity - Adult  Goal: Incisions, wounds, or drain sites healing without S/S of infection  Outcome: Progressing     Problem: Musculoskeletal - Adult  Goal: Return ADL status to a safe level of function  Outcome: Progressing     Problem: Gastrointestinal - Adult  Goal: Maintains adequate nutritional intake  Outcome: Progressing     Problem: Genitourinary - Adult  Goal: Absence of urinary retention  Outcome: Progressing     Problem: Infection - Adult  Goal: Absence of infection during hospitalization  Outcome: Progressing     Problem: Metabolic/Fluid and Electrolytes - Adult  Goal: Electrolytes maintained within normal limits  Outcome: Progressing     Problem: Hematologic - Adult  Goal: Maintains hematologic stability  Outcome: Progressing     Problem: Anxiety  Goal: Will report anxiety at manageable levels  Description: INTERVENTIONS:  1. Administer medication as ordered  2. Teach and rehearse alternative coping skills  3. Provide emotional support with 1:1 interaction with staff  Outcome: Progressing     Problem: Coping  Goal: Pt/Family able to verbalize concerns and demonstrate effective coping strategies  Description: INTERVENTIONS:  1. Assist patient/family to identify coping skills, available support systems and cultural and spiritual values  2. Provide emotional support, including active listening and acknowledgement of concerns of patient and caregivers  3. Reduce environmental stimuli, as able  4. Instruct patient/family in relaxation techniques, as appropriate  5.  Assess for spiritual pain/suffering and initiate Spiritual Care, Psychosocial Clinical Specialist consults as needed  Outcome: Progressing     Problem: Nutrition Deficit:  Goal: Optimize nutritional status  Outcome: Progressing

## 2022-11-17 NOTE — PROGRESS NOTES
OCCUPATIONAL THERAPY TREATMENT NOTE    [employer] [id number] [address]       SZEW:                                                               Patient Name: Paola Britton  MRN: [id number]  : 1955  Room: [address] [place] [address]    Evaluating OT: Zaire Signs, OTR/L [id number]     Referring Provider: Nereyda Marshall DO   Specific Provider Orders/Date: OT eval and treat (22)        Diagnosis: Tongue mass [K14.8]  Cancer of oral cavity ([facility] [C06.9]  Oral cavity carcinoma ([address] [C06.9]  Primary cancer of oral cavity ([facility] [facility] [C06.9]     Surgery/Procedures:  MOUTH LESION BIOPSY EXCISION,NECK DISSECTION RADICAL,FREE FLAP GRAFT NECK DISSECTION-ROBOTIC         Pertinent Medical History:    Past Medical History        Past Medical History:   Diagnosis Date    Acid reflux disease      Cancer of oral cavity ([facility] [address] 10/14/2022    Hyperlipidemia      Hypertension              *Precautions:  Fall risk, maintain NWB LUE s/p flap until further clarified, CARLITOS x 3, O2 via trach mask, PEG     Assessment of current deficits   [x] Functional mobility          [x]ADLs           [x] Strength                  []Cognition   [x] Functional transfers        [x] IADLs         [x] Safety Awareness   [x]Endurance   [x] Fine Coordination           [x] ROM           [] Vision/perception    []Sensation     [x]Gross Motor Coordination [x] Balance    [] Delirium                  [x]Motor Control     [x] Communication     OT PLAN OF CARE   OT POC based on physician orders, patient diagnosis and results of clinical assessment.         Frequency/Duration: 1-3 days/wk for 1-2 weeks PRN    Specific OT Treatment to include:   ADL retraining/adapted techniques and AE recommendations to increase functional independence within precautions                    Energy conservation techniques to improve tolerance for selfcare routine   Functional transfer/mobility training/DME recommendations for increased independence, safety and fall prevention         Patient/family education to increase safety and functional independence within precautions              Environmental modifications for safe mobility and completion of ADLs                           Sensory re-education techniques to improve extremity awareness, maintain skin integrity and improve hand function                             Splinting/positioning needs to maintain joint/skin integrity and prevent contractures  Therapeutic activity to improve functional performance during ADLs/IADLs                                         Therapeutic exercise to improve tolerance and functional strength for ADLs   Balance retraining exercises/tasks for facilitation of postural control with dynamic challenges during ADLs . Positioning to improve functional independence     Recommended Adaptive Equipment: TBA      Home Living: Pt lives with wife/family  in a 1 floor plan with 2 step(s) to enter and 1 rail(s); bed/bath on first floor  Bathroom setup: walk in shower with small step  Equipment owned: no DME     Prior Level of Function: IND with ADLs;  IND with IADLs. No device for ambulation. Driving: yes  Occupation: retired     Pain Level: pt c/o 0/10 pain  this session     Cognition: A&O: 4/4    Follows 1-2 step commands appropriately. Memory: Fair             Comprehension Fair             Problem solving: Fair             Judgement/safety: Fair                Communication skills: grossly WFL (using white board & gestures to communicate)             Vision: grossly WFL                    Glasses:yes (not present)                                                       Hearing: WFL               RASS: 0  CAM-ICU: (NT) Delirium     UE Assessment:  Hand Dominance: Right [x]  Left []       ROM Strength   RUE  WFL Grossly WFL   LUE WFL Grossly WFL      Sensation: No c/o numbness/tingling in extremities.    Tone: WNL   Edema: [facility]    Functional Assessment:  AM-PAC Daily Activity Raw Score: 11/24    Initial Eval Status  Date: 11/15/22 Treatment Status  Date:11/16/22 STGs = LTGs  Time frame: 7-14 days   Feeding NGT  NGT  Re-assess when indicated      Grooming Max A  Mod A  Seated   (Limited by medical lines)                       Min A   while standing sink level requiring AD as needed for balance and demonstrating G tolerance       UB dressing/bathing Max A  Max A dressing  (Limited by medical lines)    Mod A sponge bathing/seated on BSC                       Min A         LB dressing/bathing Dep  Mod A                       Min A   using AE as needed for safe reach/ energy conservation        Toileting NT  Mod A                       Min A      Bed Mobility  Supine to sit: Max A     Sit to supine:   NT Supine to sit:   Min A with min cues for technique  Sit to supine:   NT                        Min A  in prep of ADL tasks & transfers   Functional Transfers Sit to stand: Mod A     Stand to sit:   Mod A  Sit to stand:   Chema     Stand to sit:   Chema  (lower chair)    SPT: Min A     Commode: Min A  (BSC)                       S  sit<>stand/functional bathroom transfers using AD/DME as needed for balance and safety   Functional Mobility Mod A  SPT to chair Min A  No device                      S   functional/bathroom mobility using AD as needed & demonstrating G safety      Balance Sitting:     Static:  SBA    Dynamic:Min A  Standing: Mod A Sitting:     Static:  SBA    Dynamic:Min A  Standing: Chema S dynamic sitting balance; S dynamic standing balance  during ADL tasks & transfers   Endurance/  Activity Tolerance    F tolerance with light  activity. F tolerance with light  to moderate activity.   G   tolerance with moderate activity/self care routine   Visual/  Perceptual    WFL                         Vitals:  Heart Rate at rest 79 bpm Heart Rate post session 89 bpm   SpO2 at rest 100% SpO2 post session 98%   Blood Pressure at rest 156/85 mmHg Blood Pressure post session 133/89 mmHg     Treatment: OT treatment provided this date:  Bed mobility: Review of precautions prior to bed mobility to facilitate safe transfers and ADLS. Pt requires min cues for safe technique. Balance retraining: Performed sitting/standing balance ex's with instruction to facilitate righting reactions with postural changes during ADLS. Pt balance improving; no LOB this date. ADL retraining: Instruction on adapted techniques while seated to increase independence and safe reach during dressing/bathing activities. Pt demonstrated G understanding. Energy conservation: Education on breathing techniques, pacing, work simplification strategies & recommended bathroom DME for safety and energy conservation during self care tasks and activities of daily living. Line management and environmental modifications made prior to and end of session to ensure patient safety and to increase efficiency of session. Skilled monitoring of HR, O2 saturation, blood pressure and patient's response to activity performed throughout session. Comments: OK from RN to see patient. Upon arrival, patient supine in bed, agreeable to session. Pt demo fair + tolerance with G understanding of education/techniques. At end of session, patient left seated in chair to increase activity tolerance . Call light within reach, all lines and tubes intact. Pt instructed on use of call light for assistance and fall prevention. Overall, pt presents with decreased activity tolerance, dynamic balance, functional mobility limiting completion of ADLs and safe return home. Pt can benefit from continued skilled OT to increase safety, functional independence & quality of life. Pt has made Good progress towards set goals.    Continue with current plan of care       Time In:1355              Time Out: 1430       Total Treatment Time: 35      Treatment Charges: Mins Units   Ther Ex  [id number]     Manual Therapy [id number] [address]   Neuro Re-ed [id number]     Orthotic manage/training  [id number]     Non-Billable Time         Blair Lira, OTR/L [id number]

## 2022-11-17 NOTE — PROGRESS NOTES
DEPARTMENT OF OTOLARYNGOLOGY - HEAD & NECK SURGERY   PROGRESS NOTE    PATIENT: Gilbert Sanders ADMIT [facility]:   ROOM/BED: [address] [place], [place] [address]S TODAY:2022     SUBJECTIVE:    No acute event overnight  Alert and oriented  Pain is well controlled  1 BM yesterday  Tube feeds at 20cc/hr, rate was decreased from 45 back to 10 cc/hr overnight due to nausea. Nausea improved currently with Zofran and Tigan      Patient's past medical and surgical history, medications, allergies, labs and imagings were reviewed. OBJECTIVE:                                                                                                                              Physical Exam  Constitutional: Appears awake, alert, cooperative, no apparent distress   Eyes: Lids and lashes normal, pupils equal, round and reactive to light,extra ocular muscles intact, sclera clear, conjunctiva normal   HENT: Doppler sounds with arterial waveform at marked site. Flap appears pink without any increased swelling or signs of venous congestion. Soft, warm to touch with normal capillary refills. Incisions are clean, moist and intact. Neck:  Supple, soft and flat without evidence of hematoma or fluid collection. Incisions clean, moist and intact. Shiley 6-0 cuffed trach midline and intact. Mild frothy secretions    Extremities Flap donor extremity warm with normal capillary refills, strong distal pulses, intact sensation and movement. No cyanosis or edema. Skin graft donor site with tegaderm/Suresite in place   Cardiovascular: Regular rate   Neurologic:  Cranial nerves II-XII are grossly intact. Marginal Mandibular nerve weak, HB III/IV     Drains:  All drains holding suction with serosanguinous output (stripped)  Drain (L lateral neck): 15 cc/24h  Drain (L medial neck): 190 cc/24h  Drain (L arm): 35 cc/24h     Output by Drain (mL) 11/15/22 0701 - 11/15/22 1900 11/15/22 190 - 22 0700 22 0701 - 22 1900 22 190 - 22 0530 Closed/Suction Drain Left; Anterior Neck Bulb 35 30 10 5   Closed/Suction Drain Left; Anterior Neck Bulb 55 120 100 90   Closed/Suction Drain Left; Anterior Elbow Bulb 15 20 25 10          Vitals:    11/17/22 0300 11/17/22 0400 11/17/22 0430 11/17/22 0500   BP: (!) 157/79 (!) 141/84  (!) 148/88   Pulse: 82 80 75 85   Resp: 19 15 15 14   Temp:  98.7 °F (37.1 °C)     TempSrc:  Axillary     SpO2: 99% 98%     Weight:       Height:         24 HR INTAKE/OUTPUT:    Intake/Output Summary (Last 24 hours) at 11/17/2022 0530  Last data filed at 11/17/2022 0500  Gross per 24 hour   Intake 2335.35 ml   Output 2425 ml   Net -89.65 ml       8HR INTAKE OUTPUT:  In: 728.5 [I.V.:549.7]  Out: 1030 [Urine:925; Drains:105]    No results found for this or any previous visit (from the past 24 hour(s)). Problem List Items Addressed This Visit          Digestive    Cancer of oral cavity (HCC)    Relevant Medications    acetaminophen (TYLENOL) 160 MG/5ML solution 650 mg    oxyCODONE (ROXICODONE) 5 MG/5ML solution 5 mg    fentaNYL (SUBLIMAZE) injection 50 mcg    Other Relevant Orders    Surgical Pathology    Surgical Pathology    Surgical Pathology    Surgical Pathology    Surgical Pathology    Surgical Pathology       Other    Tongue mass    Relevant Orders    Surgical Pathology    Surgical Pathology    Surgical Pathology    Surgical Pathology    Surgical Pathology    Surgical Pathology     Other Visit Diagnoses       Pre-operative laboratory examination    -  Primary    Relevant Orders    CBC (Completed)    Type and Screen (Completed)    EKG 12 Lead    Culture, MRSA, Screening (Completed)               ASSESSMENT:  69 y.o.male with history of oral cavity SCC s/p left hemiglossectomy, left neck dissection and left ulnar forearm free flap reconstruction with Finesse Anderson and Cirilo Marshall on 11/14/22. PLAN:   Flap: Continue Q1H flap checks with doppler per nursing. Flap donor extremity must be elevated at all times.  Please call on-call ENT resident for any change in color in flap color, doppler sounds, or increased swelling/firmness at the related flap or donor site. Avoid intraoral suctioning or manipulating of the tongue. CV: Discontinue arterial line POD1 - done. Maintain systolic blood pressure between 100-160. Keep MAP greater than 70. Resp: Wean O2 as able. Humidified air at all times. Trach suctions Q2H to prevent mucus plug and per protocol. Analgesia: Scheduled Tylenol. PCA, transition to Roxicodone through the NGT/PEG as tolerated  GI: PPI (Peptamen if fish allergy), bowel regimen, dietary consult, POD#1 (Wait 24 hrs) begin trickle feeds If bowel sounds present prefer 2cal HN at 10 cc/hr for 24 hrs, increase rate by 5-10ml/hour Q6hrs as tolerated to protect suture lines from reflux   Advancing tube feed protocol: check residual Q4H. If >300cc, stop TF for 1-4h and recheck residual  If 200cc-300, half the current rate  If 100cc-200, maintain current rate and recheck residual Q4H  If <100cc, advance TF as above  : Maintain nettles, discontinue POD2 (done)  ID: Continue Unasyn Q6H while drains are in place; continue Decadron 8 mg Q8H 3 days postoperatively-done . Drains: Monitor output, stripping with flap checks  Embolic PPX: SCDs while in bed & SubQ Heparin 5000 TID for DVT prophylaxis and assist flap perfusion    PT/OT: encourage out of bed to chair, ambulate as tolerated  Medical management per medicine/SICU, [facility] care  Speech therapy - OK for speaking valve and speaking,  social work, [facility], consults ordered  Dispo: ok for Transfer to [facility] today     Patient seen and examined. Plans discussed with attending physician.       Toby Magdaleno DO  Resident Physician  Otolaryngology  Melita Alvarez 1945 11/17/2022  5:30 AM

## 2022-11-17 NOTE — PROGRESS NOTES
Nutrition Assessment    Type and Reason for Visit:  Consult (Phone call from ENT for Bolus recs for discharge)    Nutrition Recommendations/Plan:     Bolus Recs: 2.0 Calorie (Fluid Restricted) 240 cc 4x/day + 2 protein modulars daily  Water flushes 250 ml 6x/day = 1500 ml total water   Provides 960 ml tv, 1920 kcals, 80 gm pro (2120 kcals & 134 gm pro w/ modulars), 672 ml free water, 2172 total water w/ flushes       Estimated Daily Nutrient Needs:  Energy Requirements Based On: Formula  Weight Used for Energy Requirements: Current  Energy (kcal/day): MSJ 1790 x 1.2 SF= 5057-7223  Weight Used for Protein Requirements: Ideal  Protein (g/day): 1.5-1.8 g/kg IBW; 115-135  Fluid (ml/day): 2807-5769    Kristen Wang RD, LD  Contact: Ext [phone]

## 2022-11-17 NOTE — PROGRESS NOTES
Speech Language Pathology      NAME:  Bonilla Escobar  :  1955  DATE: 2022  ROOM:  3806/[facility]-A    Checked in on Pt today. Reviewed voice rest and strict NPO at this time with him. He communicated no questions at this time. Will cont to follow and adjust POC as cleared.      Tongue mass [K14.8]  Cancer of oral cavity (HCC) [C06.9]  Oral cavity carcinoma (HCC) [C06.9]  Primary cancer of oral cavity (HCC) [C06.9]

## 2022-11-17 NOTE — PROGRESS NOTES
ENT Flap Check     Patient doing well. No respiratory distress, currently on trach mask at 5 L. Able to communicate appropriately. Flap appears well perfused, good capillary refill, no evidence of venous congestion or hematoma. Intraoral doppler checks show good triphasic waves. Will continue to monitor vitals closely  Monitor pain control  Ok for transfer out of the SICU. Continue Q 1 hour nursing flap checks. Q4 hour nursing checks once transferred to 4500.       Call ENT with any concerns or questions

## 2022-11-17 NOTE — PROGRESS NOTES
Physical Therapy  Physical Therapy Daily Treatment Note      Name: Alex Marks  : 1955  MRN: [id number]      Date of Service: 2022    Evaluating PT:  Katja Krishnan, PT, DPT [id number]    Room #:  5644/1152-  Diagnosis:  Tongue mass [K14.8]  [facility] ([facility] [C06.9]  Oral cavity carcinoma (HCC) [C06.9]  Primary cancer of oral cavity ([facility] [C06.9]  PMHx/PSHx:    Past Medical History:   Diagnosis Date    Acid reflux disease     Cancer of oral cavity ([facility] 10/14/2022    Hyperlipidemia     Hypertension      Procedure/Surgery:   L hemiglossectomy, Lneck dissection, L  ulnar forearm free flap reconstruction, and open tracheostomy   Precautions:  Falls, assume NWB LUE s/p flap, CARLITOS x 3, O2 via trach mask, PEG  Equipment Needs:  TBD    SUBJECTIVE:    Pt lives with wife in a 1 story home with 2 stairs to enter and 1 rail. Pt ambulated without device and was independent PTA. Pt's children can assist at discharge. OBJECTIVE:   Initial Evaluation  Date: 11/15/22 Treatment  22 Short Term/ Long Term   Goals   AM-PAC 6 Clicks 91/97 43/95    Was pt agreeable to Eval/treatment? yes Yes     Does pt have pain?  1/10 surgical pain Surgical pain but no rating given    Bed Mobility  Rolling: NT  Supine to sit: MaxA with HOB elevated  Sit to supine: NT  Scooting: MaxA Rolling: NT  Supine to sit: Chema with HOB elevated  Sit to supine: NT  Scooting: Chema to EOB Mod Independent   Transfers Sit to stand: ModA  Stand to sit: ModA  Stand pivot: ModA with HHA Sit to stand: Chema  Stand to sit: Chema  Stand pivot: Chema with HHA Independent   Ambulation   A few steps to chair with ModA with HHA 50 feet with Chema with HHA >400 feet Independently    Stair negotiation: ascended and descended NT NT >4 steps with 1 rail Mod Independent   ROM BUE:  Defer to OT note  BLE:  WFL     Strength BUE:  Defer to OT note  BLE:  4+/5  Increase by 1/3 MMT grade   Balance Sitting EOB:  Chema dynamic  Dynamic Standing:  ModA with HHA Sitting EOB:  SBA dynamic  Dynamic Standing: Mod A with HHA Sitting EOB:  Independent  Dynamic Standing:  Independent     Pt is A & O x 4  CAM-ICU: NT  RASS: 0  Sensation:  No reported paresthesias   Edema:  None    Vitals:  Heart Rate at rest 79 bpm Heart Rate post session 89 bpm   SpO2 at rest 100% SpO2 post session 98%   Blood Pressure at rest 156/85 mmHg Blood Pressure post session 133/89 mmHg       Functional Status Score-Intensive Care Unit (FSS-ICU)   Rolling -/7   Supine to sit transfer 3/7   Unsupported sitting  5/7   Sit to stand transfers 4/7   Ambulation 2/7   Total  14/35       Therapeutic Exercises:  NA    Patient education  Pt educated on safety    Patient response to education:   Pt verbalized understanding Pt demonstrated skill Pt requires further education in this area   trach yes yes     ASSESSMENT:    Conditions Requiring Skilled Therapeutic Intervention:    [x]Decreased strength     []Decreased ROM  [x]Decreased functional mobility  [x]Decreased balance   [x]Decreased endurance   [x]Decreased posture  []Decreased sensation  []Decreased coordination   []Decreased vision  []Decreased safety awareness   []Increased pain       Comments:  RN reported pt was medically stable. Pt was in bed upon arrival, agreeable to treatment. Pt demonstrated improved mobility this session. Pt ambulated with decreased gait speed and unsteadiness that improved with distance. HHA given for safety and pt's comfort. Pt requested to use bedside commode upon return to room. Pt was then left in chair with all needs met and call light in reach. All lines remained intact. Treatment:  Patient practiced and was instructed in the following treatment:    Bed mobility training - pt given verbal and tactile cues to facilitate proper sequencing and safety during supine>sit as well as provided with physical assistance.   Sitting EOB for >5 minutes for upright tolerance, postural awareness and BLE ROM  Transfer training - pt was given verbal and tactile cues to facilitate proper hand placement, technique and safety during sit to stand, stand to sit and stand pivot transfers as well as provided with physical assistance. Gait training- pt was given verbal and tactile cues to facilitate safety and balance during ambulation as well as provided with physical assistance. PHYSICAL THERAPY PLAN OF CARE:  Pt is making good progress towards established goals. Continue PT POC.      Specific instructions for next treatment:  Progress activity    Time in 1345  Time out 1415    Total Treatment Time  30 minutes     CPT codes:  [] Low Complexity PT evaluation 79209  [] Moderate Complexity PT evaluation 09958  [] High Complexity PT evaluation 18552  [] PT Re-evaluation 01763  [] Gait training 09112 - minutes  [] Manual therapy 67525 - minutes  [x] Therapeutic activities 66036 30 minutes  [] Therapeutic exercises 21547 - minutes  [] Neuromuscular reeducation 02294 - minutes     Simón Alcantara, PT, DPT  [id number]

## 2022-11-17 NOTE — CARE COORDINATION
Tolerating 28% tm. L lateral and medial nisha drains and L arm nisha in place. Notified Saritha at [facility] of orders. She informed me that they can't provide dressings until the nurse evaluates pt. Will need to send pt home with dressing supplies til nurse visits. [facility] dme following for o2 supplies. Spoke with Shalom Kemp. Anticipating that trach supplies may need to be delivered tomorrow if plan is to discharge at beginning of week, since their resp therapist doesn't deliver on weekends. Will update her tomorrow.

## 2022-11-17 NOTE — OP NOTE
Operative Note      Patient: Joseph Diaz  YOB: 1955  MRN: [id number]    Date of Procedure: 11/14/2022    Pre-Op Diagnosis: Tongue mass [K14.8]  Cancer of oral cavity ([address] [C06.9]    Post-Op Diagnosis:  tongue cancer. Procedure- left hemiglossectomy, left forearm free flap, split thickness skin graft, skin substitute placement, tracheostomy, excision of submandibular gland, infrahyoid and suprahyoid muscle suspension. Skin advanement flap 3 cm sq    Surgeon(s):  DO Natalie Mclaughlin MD    Assistant:   Surgical Assistant: Kelley Humphries  Resident: Janey Rudd DO; Doyle Muñiz DO; Primo Fuller DO    Anesthesia: General    Estimated Blood Loss (mL): 957    Complications: None    Specimens:   ID Type Source Tests Collected by Time Destination   A : LESSER CORNU HYOID MARGIN Tissue Tissue SURGICAL PATHOLOGY Natalie De Luna MD 11/14/2022 1135    B : LEFT NECK LEVEL 1A, 1B Tissue Tissue SURGICAL PATHOLOGY Natalie De Luna MD 11/14/2022 1146    C : LEFT HEMIGLOSSECTOMY Tissue Tissue SURGICAL PATHOLOGY Natalie De Luna MD 11/14/2022 1159    D : Floretta Row  Tissue Tissue SURGICAL PATHOLOGY Natalie De Luna MD 11/14/2022 1215    E : HYOID BONE AND HYPOGLOSSUS MUSCLE. BLACK PAEZ POSITIVE FROZEN SECTION. HEMIGLOSSECTOMY MARGIN. YELLOW LATERAL HYOID BONE MARGIN. BLUE MEDIAL HYOID BONE MARGIN. ORANGE PATIENT DEEP MUSCLE MARGIN. Tissue Tissue SURGICAL PATHOLOGY Natalie De Luna MD 11/14/2022 1221    F : LEFT NECK CONTENTS LEVELS 2 THROUGH 4  Tissue Tissue SURGICAL PATHOLOGY Natalie De Luna MD 11/14/2022 1321        Implants:  Implant Name Type Inv.  Item Serial No.  Lot No. LRB No. Used Action   GEM  MICROVASCULAR  ANASTOMOTIC  DEVICE 3.0MM   [id number]  [id number] Left 1 Implanted    2MM [facility] VASC - [id number] Anastomosis rings  2MM [facility] VASC  [employer] [id number] Left 1 Implanted Drains:   Closed/Suction Drain Left; Anterior Neck Bulb (Active)   Site Description Clean, dry & intact 11/17/22 1200   Dressing Status Clean, dry & intact 11/17/22 1200   Drainage Appearance Bloody 11/17/22 1200   Drain Status To bulb suction 11/17/22 1200   Output (ml) 5 ml 11/16/22 2300       Closed/Suction Drain Left; Anterior Neck Bulb (Active)   Site Description Clean, dry & intact 11/17/22 1200   Dressing Status Clean, dry & intact 11/17/22 1200   Drainage Appearance Bloody 11/17/22 1200   Drain Status To bulb suction 11/17/22 1200   Output (ml) 10 ml 11/17/22 0600       Closed/Suction Drain Left; Anterior Elbow Bulb (Active)   Site Description Clean, dry & intact 11/17/22 1200   Dressing Status Clean, dry & intact 11/17/22 1200   Drainage Appearance Bloody; Serosanguinous 11/17/22 1200   Drain Status To bulb suction 11/17/22 1200   Output (ml) 5 ml 11/17/22 0600       Gastrostomy/Enterostomy/Jejunostomy Tube LUQ 20 fr (Active)   Drainage Appearance None 11/17/22 1200   Site Description Clean, dry & intact 11/17/22 1200   G Port Status Infusing 11/17/22 1200   Surrounding Skin Clean, dry & intact 11/17/22 1200   Dressing Status Clean, dry & intact 11/16/22 0600   Dressing Type Open to air 11/17/22 1200   G-Tube Care Completed Yes 11/16/22 2200   Tube Feeding Other Tube Feeding (must specify product in comment) 11/17/22 1200   Tube feeding/verify rate (mL/hr) 25 mL/hr 11/17/22 0805   Tube Feeding Intake (mL) 67 ml 11/17/22 0600   Free Water/Flush (mL) 30 mL 11/17/22 0600       [REMOVED] Closed/Suction Drain Left; Anterior Other (Comment) Bulb (Removed)   Site Description Clean, dry & intact 11/15/22 0600   Dressing Status Clean, dry & intact 11/15/22 0600   Drainage Appearance Bloody;Bright red 11/15/22 0600   Drain Status To bulb suction 11/15/22 0600   Output (ml) 20 ml 11/15/22 0527       [REMOVED] Urinary Catheter 11/14/22 2 Way (Removed)   $ Urethral catheter insertion Inserted for procedure 11/15/22 0000 Catheter Indications Urinary retention (acute or chronic), continuous bladder irrigation or bladder outlet obstruction; Need for fluid volume management of the critically ill patient in a critical care setting 11/16/22 0600   Site Assessment No urethral drainage 11/16/22 0600   Urine Color Yellow 11/16/22 0600   Urine Appearance Clear 11/16/22 0600   Collection Container Standard 11/16/22 0600   Securement Method Securing device (Describe) 11/16/22 0600   Catheter Care Completed Yes 11/15/22 2000   Catheter Best Practices  Drainage tube clipped to bed; Tamper seal intact; Bag below bladder;Bag not on floor; Lack of dependent loop in tubing;Drainage bag less than half full 11/16/22 0600   Status Draining;Patent 11/16/22 0600   Output (mL) 375 mL 11/16/22 1500       Findings: 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 Detailed Description of Procedure:     Tracheostomy Procedure Note     DESCRIPTION OF PROCEDURE: The patient was taken to Operating Room  identified as Ana M Bang and the procedure verified. 10 cc of Lidocaine 1% with epinephrine was used to infiltrate the skin and subcutaneous tissue. A transverse incision was made one fingerbreadth above the suprasternal notch and dissection taken down through the platysma to the strap muscles, which were divided in the midline. Hemostasis was excellent. Dissection continued through the thyroid gland where the isthmus was divided and the pretracheal fascia was cauterized. A box-type incision was made in the second tracheal ring to create flaps and the trachea was opened. The endotracheal tube was slid back and dense secretions were aspirated. Trachea flap was sutured to a skin flap with a 3.0 chromic suture. A # 7.5 armored tube low-pressure cuff was successfully inserted into the trachea and the balloon inflated.  The patient was then ventilated successfully with excellent oxygen saturations and CO2 return. Saline was instilled into the trachea and pulmonary toilet ensued. The tube was secured to the skin using 3-0 silk suture. Attention then turned to tongue cancer. The patient was placed on the operating room table in the supine position. After adequate general endotracheal anesthesia was administered, a shoulder roll placed under the shoulders and the face was placed in an extended fashion. The bilateral neck, chest, and face were prepped with Betadine and draped in a sterile fashion. 1 cm margin around the lateral tongue cancer was made with monopolar and workedaround mass starting anteriolry and working along fom with a cuff of mucosa left for closure later. Then working dorsally to posterior tongue. Was able to get around tumor posteriolry thru the mucosa, but there was a tract of tumor that was tracking deep thru the tongue  muscle and unable to get around the tumor transorally. Therefore, we started a neck dissection incision and started laterally to identify the scm andthen the  digatric. Worked under the digastric and retracted it superiolry and ided the hypoglossal nerve. Traced the nercve into the tongue where we met up with the tract of tumor form the transoral approach. Worked around the mass and then was able to deliver the tongue through the defect and into the neck. From here was able to see full range of tongue involved and worked around the cancer. There appeared to be a tract of tumor that was heading down the hyoglossal muscle to the hyoid bone lesser cornu- this muscle was biopsied and sent for frozen- positive on frozen. Additional muscle and and a portion of the hyoid bone to include the left portion was removed along with the infrahyoid muscle cuff was taken, and patient margins of muscle were negative as were all mucosal and muscle margins of the specimen.   The mass was then left connected to the submandibular gland and perifacial nodes also removed of the defect for a total of 3 cm sq. The forearm skin was then inset into the   defect using 4.0 vicryl pops starting at the posterior tongue margin. The flap was cut in a snake tongue fashion and sewn into the tongue and then up along the fom with good visualizaiton, continued up over dorsal tongue. At this time ,   Robot was docked and and instruments loaded to check to ensure a good water tight closure . Tonsil gag placed and visualized the area, there was an area at the crotch of the snake tongue portion of the flap that was a bit flacid and floppy so additional sutures were placed from skin to mucosa and a very good seal was seen all along the suture line of 3 cm intermediate contaminated closure. .      Attention then turned to vessel anastomosis as 9.0 nylon on bv100 needle was used to   anastomose the ulnar artery to the facial artery stump. One venae was coupled to the a branch off the common facial lingual trunk (3.0 ) and the another vein was coupled to the external jug (2.0 ). Blood   flow was resumed and there was excellent flow thru the flap with good bleeding and   doppler flow, however it quickly seemed slow and we ided a clot at the artery sutre line just donwstream to the anastomosis. So it was cut out and removed and resewn with good flow. Stsg was harvested form leg elft, 8x 10 cm sq and covered with skin substitute. Bolster and dressing and tegaderm . The arm site was closed with primary closure to the superior portion of the defect and   the inferior area was closedskin graft harvested form the leg . Skin graft sewn in with chromic and covered with 5x9   xeroform, cling for bolster, cast padding, and 5x30 cm ten thick of plaster cast covered   with ace wrap. One 10 fr Drains placed in arm .  The wound was closed in layers using a 4.0 vicryl in a buried   knot interrupted fashion for the subcutaneous tissue and the skin was closed with   staples with antibiotic ointment placed superficially. There were no intraoperative   complications. A tracheostomy tape was used to secure the flange around the neck leaving two fingerbreadths space beneath the tape. Stay sutures used to secure the trachea were taped to the skin in the event of accidental extubation. Sterile dressing was applied. Hemostasis was excellent throughout the case. Needle and sponge counts were correct at the end. The patient tolerated the procedure very well. The patient was awakened, taken off the vest as breathing on his own, and taken to the icu room in good condition. There were no perioperative complications.    Pt tolerated well    Electronically signed by Clifton Kebede MD on 11/17/2022 at 1:11 PM

## 2022-11-17 NOTE — PROGRESS NOTES
Tube Feed was turned off during day shift due to complaints of nausea. Day shift nurse gave him Zofran and Tigan. Tube feed was restarted at 2100 at the last rate of 45/hour. At 2200, patient was again complaining of nausea. I notified Dr. Forest Chin of the situation. Per Dr. Forest Chin, he recommended to turn the rate back down to the initial ordered rate of 10/hour and to increase by 10/hour every 4 hours to see how patient responds. Tube Feed was turned back on at 2230 at a rate of 10/hour. Will go up to 20/hour at 0230. At 2300, patient was no longer complaining of nausea.

## 2022-11-18 LAB
ALBUMIN SERPL-MCNC: 3.5 G/DL (ref 3.5–5.2)
ALP BLD-CCNC: 94 U/L (ref 40–129)
ALT SERPL-CCNC: 71 U/L (ref 0–40)
ANION GAP SERPL CALCULATED.3IONS-SCNC: 10 MMOL/L (ref 7–16)
AST SERPL-CCNC: 64 U/L (ref 0–39)
BASOPHILS ABSOLUTE: 0.01 E9/L (ref 0–0.2)
BASOPHILS RELATIVE PERCENT: 0.1 % (ref 0–2)
BILIRUB SERPL-MCNC: 0.4 MG/DL (ref 0–1.2)
BUN BLDV-MCNC: 13 MG/DL (ref 6–23)
CALCIUM IONIZED: 1.3 MMOL/L (ref 1.15–1.33)
CALCIUM SERPL-MCNC: 9.3 MG/DL (ref 8.6–10.2)
CHLORIDE BLD-SCNC: 101 MMOL/L (ref 98–107)
CO2: 30 MMOL/L (ref 22–29)
CREAT SERPL-MCNC: 0.8 MG/DL (ref 0.7–1.2)
EOSINOPHILS ABSOLUTE: 0.01 E9/L (ref 0.05–0.5)
EOSINOPHILS RELATIVE PERCENT: 0.1 % (ref 0–6)
GFR SERPL CREATININE-BSD FRML MDRD: >60 ML/MIN/1.73
GLUCOSE BLD-MCNC: 138 MG/DL (ref 74–99)
HCT VFR BLD CALC: 32.3 % (ref 37–54)
HEMOGLOBIN: 10.8 G/DL (ref 12.5–16.5)
IMMATURE GRANULOCYTES #: 0.06 E9/L
IMMATURE GRANULOCYTES %: 0.9 % (ref 0–5)
LYMPHOCYTES ABSOLUTE: 0.84 E9/L (ref 1.5–4)
LYMPHOCYTES RELATIVE PERCENT: 12.4 % (ref 20–42)
MAGNESIUM: 2.1 MG/DL (ref 1.6–2.6)
MCH RBC QN AUTO: 29.7 PG (ref 26–35)
MCHC RBC AUTO-ENTMCNC: 33.4 % (ref 32–34.5)
MCV RBC AUTO: 88.7 FL (ref 80–99.9)
MONOCYTES ABSOLUTE: 0.51 E9/L (ref 0.1–0.95)
MONOCYTES RELATIVE PERCENT: 7.5 % (ref 2–12)
NEUTROPHILS ABSOLUTE: 5.35 E9/L (ref 1.8–7.3)
NEUTROPHILS RELATIVE PERCENT: 79 % (ref 43–80)
PDW BLD-RTO: 12.4 FL (ref 11.5–15)
PHOSPHORUS: 3.9 MG/DL (ref 2.5–4.5)
PLATELET # BLD: 197 E9/L (ref 130–450)
PMV BLD AUTO: 10.9 FL (ref 7–12)
POTASSIUM SERPL-SCNC: 3.9 MMOL/L (ref 3.5–5)
RBC # BLD: 3.64 E12/L (ref 3.8–5.8)
SODIUM BLD-SCNC: 141 MMOL/L (ref 132–146)
TOTAL PROTEIN: 6.1 G/DL (ref 6.4–8.3)
WBC # BLD: 6.8 E9/L (ref 4.5–11.5)

## 2022-11-18 PROCEDURE — 6370000000 HC RX 637 (ALT 250 FOR IP): Performed by: OTOLARYNGOLOGY

## 2022-11-18 PROCEDURE — 2580000003 HC RX 258

## 2022-11-18 PROCEDURE — 6360000002 HC RX W HCPCS

## 2022-11-18 PROCEDURE — 6370000000 HC RX 637 (ALT 250 FOR IP)

## 2022-11-18 PROCEDURE — 85025 COMPLETE CBC W/AUTO DIFF WBC: CPT

## 2022-11-18 PROCEDURE — 82330 ASSAY OF CALCIUM: CPT

## 2022-11-18 PROCEDURE — 97530 THERAPEUTIC ACTIVITIES: CPT

## 2022-11-18 PROCEDURE — 6360000002 HC RX W HCPCS: Performed by: OTOLARYNGOLOGY

## 2022-11-18 PROCEDURE — 2700000000 HC OXYGEN THERAPY PER DAY

## 2022-11-18 PROCEDURE — 83735 ASSAY OF MAGNESIUM: CPT

## 2022-11-18 PROCEDURE — 2580000003 HC RX 258: Performed by: OTOLARYNGOLOGY

## 2022-11-18 PROCEDURE — 80053 COMPREHEN METABOLIC PANEL: CPT

## 2022-11-18 PROCEDURE — 2060000000 HC ICU INTERMEDIATE R&B

## 2022-11-18 PROCEDURE — 84100 ASSAY OF PHOSPHORUS: CPT

## 2022-11-18 RX ORDER — OXYCODONE HCL 5 MG/5 ML
5 SOLUTION, ORAL ORAL EVERY 6 HOURS
Status: DISCONTINUED | OUTPATIENT
Start: 2022-11-18 | End: 2022-11-20

## 2022-11-18 RX ORDER — POLYETHYLENE GLYCOL 3350 17 G/17G
17 POWDER, FOR SOLUTION ORAL DAILY
Status: DISCONTINUED | OUTPATIENT
Start: 2022-11-18 | End: 2022-11-21 | Stop reason: HOSPADM

## 2022-11-18 RX ORDER — ACETAMINOPHEN 160 MG/5ML
325 SOLUTION ORAL EVERY 6 HOURS
Status: DISCONTINUED | OUTPATIENT
Start: 2022-11-18 | End: 2022-11-21 | Stop reason: HOSPADM

## 2022-11-18 RX ADMIN — HYDRALAZINE HYDROCHLORIDE 5 MG: 20 INJECTION INTRAMUSCULAR; INTRAVENOUS at 03:09

## 2022-11-18 RX ADMIN — TETRAHYDROZOLINE HCL 4 DROP: 0.05 SOLUTION/ DROPS OPHTHALMIC at 20:57

## 2022-11-18 RX ADMIN — BACITRACIN ZINC: 500 OINTMENT TOPICAL at 00:00

## 2022-11-18 RX ADMIN — ACETAMINOPHEN 325 MG: 160 SOLUTION ORAL at 18:06

## 2022-11-18 RX ADMIN — Medication 10 MG: at 20:49

## 2022-11-18 RX ADMIN — TETRAHYDROZOLINE HCL 4 DROP: 0.05 SOLUTION/ DROPS OPHTHALMIC at 08:20

## 2022-11-18 RX ADMIN — LANSOPRAZOLE 30 MG: KIT at 08:19

## 2022-11-18 RX ADMIN — BACITRACIN ZINC: 500 OINTMENT TOPICAL at 15:05

## 2022-11-18 RX ADMIN — AMPICILLIN SODIUM AND SULBACTAM SODIUM 3000 MG: 2; 1 INJECTION, POWDER, FOR SOLUTION INTRAMUSCULAR; INTRAVENOUS at 04:51

## 2022-11-18 RX ADMIN — LANSOPRAZOLE 30 MG: KIT at 15:06

## 2022-11-18 RX ADMIN — SENNOSIDES 10 ML: 8.8 SYRUP ORAL at 20:57

## 2022-11-18 RX ADMIN — AMPICILLIN SODIUM AND SULBACTAM SODIUM 3000 MG: 2; 1 INJECTION, POWDER, FOR SOLUTION INTRAMUSCULAR; INTRAVENOUS at 13:11

## 2022-11-18 RX ADMIN — ACETAMINOPHEN 650 MG: 160 SOLUTION ORAL at 04:52

## 2022-11-18 RX ADMIN — ACETAMINOPHEN 325 MG: 160 SOLUTION ORAL at 11:25

## 2022-11-18 RX ADMIN — SODIUM CHLORIDE, PRESERVATIVE FREE 10 ML: 5 INJECTION INTRAVENOUS at 08:20

## 2022-11-18 RX ADMIN — AMPICILLIN SODIUM AND SULBACTAM SODIUM 3000 MG: 2; 1 INJECTION, POWDER, FOR SOLUTION INTRAMUSCULAR; INTRAVENOUS at 00:02

## 2022-11-18 RX ADMIN — OXYCODONE HYDROCHLORIDE 5 MG: 5 SOLUTION ORAL at 13:10

## 2022-11-18 RX ADMIN — SODIUM CHLORIDE, POTASSIUM CHLORIDE, SODIUM LACTATE AND CALCIUM CHLORIDE: 600; 310; 30; 20 INJECTION, SOLUTION INTRAVENOUS at 13:23

## 2022-11-18 RX ADMIN — OXYCODONE HYDROCHLORIDE 5 MG: 5 SOLUTION ORAL at 18:06

## 2022-11-18 RX ADMIN — POLYETHYLENE GLYCOL 3350 17 G: 17 POWDER, FOR SOLUTION ORAL at 08:19

## 2022-11-18 RX ADMIN — HYDRALAZINE HYDROCHLORIDE 5 MG: 20 INJECTION INTRAMUSCULAR; INTRAVENOUS at 08:35

## 2022-11-18 RX ADMIN — SODIUM CHLORIDE, PRESERVATIVE FREE 10 ML: 5 INJECTION INTRAVENOUS at 20:48

## 2022-11-18 RX ADMIN — METOPROLOL TARTRATE 100 MG: 50 TABLET, FILM COATED ORAL at 08:19

## 2022-11-18 RX ADMIN — AMPICILLIN SODIUM AND SULBACTAM SODIUM 3000 MG: 2; 1 INJECTION, POWDER, FOR SOLUTION INTRAMUSCULAR; INTRAVENOUS at 18:06

## 2022-11-18 RX ADMIN — ATORVASTATIN CALCIUM 10 MG: 20 TABLET, FILM COATED ORAL at 20:48

## 2022-11-18 RX ADMIN — HEPARIN SODIUM 5000 UNITS: 10000 INJECTION INTRAVENOUS; SUBCUTANEOUS at 20:49

## 2022-11-18 RX ADMIN — HEPARIN SODIUM 5000 UNITS: 10000 INJECTION INTRAVENOUS; SUBCUTANEOUS at 04:52

## 2022-11-18 RX ADMIN — HEPARIN SODIUM 5000 UNITS: 10000 INJECTION INTRAVENOUS; SUBCUTANEOUS at 15:05

## 2022-11-18 RX ADMIN — BACITRACIN ZINC: 500 OINTMENT TOPICAL at 06:56

## 2022-11-18 RX ADMIN — ACETAMINOPHEN 650 MG: 160 SOLUTION ORAL at 00:00

## 2022-11-18 RX ADMIN — GUAIFENESIN 400 MG: 400 TABLET ORAL at 11:26

## 2022-11-18 ASSESSMENT — PAIN SCALES - GENERAL
PAINLEVEL_OUTOF10: 5
PAINLEVEL_OUTOF10: 6
PAINLEVEL_OUTOF10: 5

## 2022-11-18 ASSESSMENT — PAIN DESCRIPTION - DESCRIPTORS
DESCRIPTORS: ACHING;DISCOMFORT;DULL

## 2022-11-18 ASSESSMENT — PAIN DESCRIPTION - LOCATION
LOCATION: HEAD
LOCATION: HEAD

## 2022-11-18 NOTE — PLAN OF CARE
Problem: Skin/Tissue Integrity  Goal: Absence of new skin breakdown  Description: 1. Monitor for areas of redness and/or skin breakdown  2. Assess vascular access sites hourly  3. Every 4-6 hours minimum:  Change oxygen saturation probe site  4. Every 4-6 hours:  If on nasal continuous positive airway pressure, respiratory therapy assess nares and determine need for appliance change or resting period.   11/17/2022 2251 by Dmitry Ko RN  Outcome: Progressing     Problem: Safety - Adult  Goal: Free from fall injury  11/17/2022 2251 by Dmitry Ko RN  Outcome: Progressing     Problem: ABCDS Injury Assessment  Goal: Absence of physical injury  11/17/2022 2251 by Dmitry Ko RN  Outcome: Progressing     Problem: Pain  Goal: Verbalizes/displays adequate comfort level or baseline comfort level  11/17/2022 2251 by Dmitry Ko RN  Outcome: Progressing

## 2022-11-18 NOTE — CARE COORDINATION
Pt ambulated with PT this am. 28% tm cont's. Iv unasyn cont's while drains in place. Spoke with Brigette Trotter at [facility]. She is going to check with RT to see if concentrator, suction machine and trach supplies can be delivered to his home today and then have o2 if needed, delivered prior to discharge. Faxed ex script to her at [address] [facility] resp therapist unable to deliver supplies today as they are already booked. They would be able to deliver on Mon 11/21 at 11 am if wife or family member will be available.

## 2022-11-18 NOTE — PROGRESS NOTES
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 Pt ambulated without device and was independent PTA. Pt's children can assist at discharge. OBJECTIVE:   Initial Evaluation  Date: 11/15/22 Treatment  22 Short Term/ Long Term   Goals   AM-PAC 6 Clicks     Was pt agreeable to Eval/treatment? yes Yes     Does pt have pain?  1/10 surgical pain Surgical pain but no rating given    Bed Mobility  Rolling: NT  Supine to sit: MaxA with HOB elevated  Sit to supine: NT  Scooting: MaxA Rolling: NT  Supine to sit: SBA with HOB elevated  Sit to supine: NT  Scooting: SBA Mod Independent   Transfers Sit to stand: ModA  Stand to sit: ModA  Stand pivot: ModA with HHA Sit to stand: Chema  Stand to sit: Chema  Stand pivot: Chema no device Independent   Ambulation   A few steps to chair with ModA with  feet with Chema no device >400 feet Independently    Stair negotiation: ascended and descended NT NT >4 steps with 1 rail Mod Independent   ROM BUE:  Defer to OT note  BLE:  WFL     Strength BUE:  Defer to OT note  BLE:  4+/5  Increase by 1/3 MMT grade   Balance Sitting EOB:  Chema dynamic  Dynamic Standing:  ModA with HHA Sitting EOB:  SBA dynamic  Dynamic Standing:  Chema no device Sitting EOB:  Independent  Dynamic Standing:  Independent     Pt is A & O x 4  CAM-ICU: NT  RASS: 0  Sensation:  No reported paresthesias   Edema:  None    Vitals:  Heart Rate at rest 96 bpm Heart Rate post session 102 bpm   SpO2 at rest 96% SpO2 post session 97%   Blood Pressure at rest 147/100 mmHg Blood Pressure post session - mmHg       Functional Status Score-Intensive Care Unit (FSS-ICU)   Rolling -/7   Supine to sit transfer 4/7   Unsupported sitting  5/7   Sit to stand transfers 4/7   Ambulation 4/7   Total  17/35       Therapeutic Exercises:  NA    Patient education  Pt educated on safety    Patient response to education:   Pt verbalized understanding Pt demonstrated skill Pt requires further education in this area   trach yes yes     ASSESSMENT:    Conditions Requiring Skilled Therapeutic Intervention:    [x]Decreased strength     []Decreased ROM  [x]Decreased functional mobility  [x]Decreased balance   [x]Decreased endurance   [x]Decreased posture  []Decreased sensation  []Decreased coordination   []Decreased vision  []Decreased safety awareness   []Increased pain       Comments:  RN reported pt was medically stable. Pt was in bed upon arrival, agreeable to treatment. Increased time required for complex medical line management. Pt continues to exhibit improved mobility with each session. Pt ambulated with decreased gait and slight unsteadiness but improved from yesterday. Pt had 2 minor LOBs when distracted by environment but no major LOBs. Mild fatigue reported at end of ambulation. Pt was left in chair with all needs met and call light in reach. All lines remained intact. Treatment:  Patient practiced and was instructed in the following treatment:    Bed mobility training - pt given verbal and tactile cues to facilitate proper sequencing and safety during supine>sit as well as provided with physical assistance.   Sitting EOB for >5 minutes for upright tolerance, postural awareness and BLE ROM  Transfer training - pt was given verbal and tactile cues to facilitate proper hand placement, technique and safety during sit to stand, stand to sit and stand pivot transfers as well as provided with physical assistance. Gait training- pt was given verbal and tactile cues to facilitate safety and balance during ambulation as well as provided with physical assistance. PHYSICAL THERAPY PLAN OF CARE:  Pt is making good progress towards established goals. Continue PT POC.      Specific instructions for next treatment:  Progress activity    Time in 0755  Time out 0833    Total Treatment Time  38 minutes     CPT codes:  [] Low Complexity PT evaluation 42147  [] Moderate Complexity PT evaluation 55293  [] High Complexity PT evaluation 82285  [] PT Re-evaluation 59363  [] Gait training 38993 - minutes  [] Manual therapy 59203 - minutes  [x] Therapeutic activities 45563 38 minutes  [] Therapeutic exercises 97338 - minutes  [] Neuromuscular reeducation 25227 - minutes     Rojas Kulkarni PT, DPT  [id number]

## 2022-11-18 NOTE — PROGRESS NOTES
DEPARTMENT OF OTOLARYNGOLOGY - HEAD & NECK SURGERY   PROGRESS NOTE    PATIENT: Ana Barnes ADMIT NOEU:71/54/5741   ROOM/BED: [facility]- TODAY:November 18, 2022     SUBJECTIVE:    No acute event overnight  Alert and oriented  Pain is well controlled  Reports trouble cough and trouble sleeping  1 BM since admission  Tube feeds at goal rate of 45 cc/hr without nausea. Ambulating with PT      Patient's past medical and surgical history, medications, allergies, labs and imagings were reviewed. OBJECTIVE:                                                                                                                              Physical Exam  Constitutional: Appears awake, alert, cooperative, no apparent distress   Eyes: Lids and lashes normal, pupils equal, round and reactive to light,extra ocular muscles intact, sclera clear, conjunctiva normal   HENT: Doppler sounds with arterial waveform at marked site. Flap appears pink without any increased swelling or signs of venous congestion. Soft, warm to touch with normal capillary refills. Incisions are clean, moist and intact. Neck:  Supple, soft and flat without evidence of hematoma or fluid collection. Incisions clean, moist and intact. Shiley 6-0 cuffed trach midline and intact. Mild frothy secretions    Extremities Flap donor extremity warm with normal capillary refills, strong distal pulses, intact sensation and movement. No cyanosis or edema. Skin graft donor site with tegaderm/Suresite in place   Cardiovascular: Regular rate   Neurologic:  Cranial nerves II-XII are grossly intact. Marginal Mandibular nerve weak, HB III/IV     Drains:  All drains holding suction with serosanguinous output (stripped)  Drain (L lateral neck): 15 cc/24h  Drain (L medial neck): 190 cc/24h  Drain (L arm): 35 cc/24h     Output by Drain (mL) 11/16/22 0701 - 11/16/22 1900 11/16/22 1901 - 11/17/22 0700 11/17/22 0701 - 11/17/22 1900 11/17/22 1901 - 11/18/22 0700 11/18/22 0701 - 11/18/22 0828   Closed/Suction Drain Left; Anterior Neck Bulb 10 5 10 20    Closed/Suction Drain Left; Anterior Neck Bulb 100 100 40 70    Closed/Suction Drain Left; Anterior Elbow Bulb 25 15 10 15           Vitals:    11/18/22 0500 11/18/22 0557 11/18/22 0600 11/18/22 0819   BP: (!) 167/93   (!) 147/100   Pulse: 85  90 (!) 102   Resp: 19 22 22   Temp:    98.7 °F (37.1 °C)   TempSrc:    Axillary   SpO2: 98%  97% 94%   Weight:  226 lb 12.8 oz (102.9 kg)     Height:         24 HR INTAKE/OUTPUT:    Intake/Output Summary (Last 24 hours) at 11/18/2022 0828  Last data filed at 11/18/2022 5700  Gross per 24 hour   Intake 2948. 33 ml   Output 3340 ml   Net -391.67 ml       8HR INTAKE OUTPUT:  In: -   Out: 200 [Urine:200]    Recent Results (from the past 24 hour(s))   CBC with Auto Differential    Collection Time: 11/18/22  5:08 AM   Result Value Ref Range    WBC 6.8 4.5 - 11.5 E9/L    RBC 3.64 (L) 3.80 - 5.80 E12/L    Hemoglobin 10.8 (L) 12.5 - 16.5 g/dL    Hematocrit 32.3 (L) 37.0 - 54.0 %    MCV 88.7 80.0 - 99.9 fL    MCH 29.7 26.0 - 35.0 pg    MCHC 33.4 32.0 - 34.5 %    RDW 12.4 11.5 - 15.0 fL    Platelets 464 949 - 139 E9/L    MPV 10.9 7.0 - 12.0 fL    Neutrophils % 79.0 43.0 - 80.0 %    Immature Granulocytes % 0.9 0.0 - 5.0 %    Lymphocytes % 12.4 (L) 20.0 - 42.0 %    Monocytes % 7.5 2.0 - 12.0 %    Eosinophils % 0.1 0.0 - 6.0 %    Basophils % 0.1 0.0 - 2.0 %    Neutrophils Absolute 5.35 1.80 - 7.30 E9/L    Immature Granulocytes # 0.06 E9/L    Lymphocytes Absolute 0.84 (L) 1.50 - 4.00 E9/L    Monocytes Absolute 0.51 0.10 - 0.95 E9/L    Eosinophils Absolute 0.01 (L) 0.05 - 0.50 E9/L    Basophils Absolute 0.01 0.00 - 0.20 E9/L   Comprehensive Metabolic Panel    Collection Time: 11/18/22  5:08 AM   Result Value Ref Range    Sodium 141 132 - 146 mmol/L    Potassium 3.9 3.5 - 5.0 mmol/L    Chloride 101 98 - 107 mmol/L    CO2 30 (H) 22 - 29 mmol/L    Anion Gap 10 7 - 16 mmol/L    Glucose 138 (H) 74 - 99 mg/dL    BUN 13 6 - 23 mg/dL    Creatinine 0.8 0.7 - 1.2 mg/dL    Est, Glom Filt Rate >60 >=60 mL/min/1.73    Calcium 9.3 8.6 - 10.2 mg/dL    Total Protein 6.1 (L) 6.4 - 8.3 g/dL    Albumin 3.5 3.5 - 5.2 g/dL    Total Bilirubin 0.4 0.0 - 1.2 mg/dL    Alkaline Phosphatase 94 40 - 129 U/L    ALT 71 (H) 0 - 40 U/L    AST 64 (H) 0 - 39 U/L   Magnesium    Collection Time: 11/18/22  5:08 AM   Result Value Ref Range    Magnesium 2.1 1.6 - 2.6 mg/dL   Phosphorus    Collection Time: 11/18/22  5:08 AM   Result Value Ref Range    Phosphorus 3.9 2.5 - 4.5 mg/dL   Calcium, Ionized    Collection Time: 11/18/22  5:08 AM   Result Value Ref Range    Calcium, Ionized 1.30 1.15 - 1.33 mmol/L         Problem List Items Addressed This Visit          Digestive    Cancer of oral cavity (HCC)    Relevant Medications    fentaNYL (SUBLIMAZE) injection 50 mcg    oxyCODONE (ROXICODONE) 5 MG/5ML solution 5 mg    acetaminophen (TYLENOL) 160 MG/5ML solution 325 mg (Start on 11/18/2022 11:45 AM)    Other Relevant Orders    Surgical Pathology    Surgical Pathology    Surgical Pathology    Surgical Pathology    Surgical Pathology    Surgical Pathology       Other    Tongue mass    Relevant Orders    Surgical Pathology    Surgical Pathology    Surgical Pathology    Surgical Pathology    Surgical Pathology    Surgical Pathology     Other Visit Diagnoses       Pre-operative laboratory examination    -  Primary    Relevant Orders    CBC (Completed)    Type and Screen (Completed)    EKG 12 Lead    Culture, MRSA, Screening (Completed)               ASSESSMENT:  69 y.o.male with history of oral cavity SCC s/p left hemiglossectomy, left neck dissection and left ulnar forearm free flap reconstruction with Finesse Castro and Hina Lanier on 11/14/22. PLAN:   Flap: Continue Q4H flap checks with doppler per nursing. Flap donor extremity must be elevated at all times.  Please call on-call ENT resident for any change in color in flap color, doppler sounds, or increased swelling/firmness at the related flap or donor site. Avoid intraoral suctioning or manipulating of the tongue. CV: Discontinue arterial line POD1 - done. Maintain systolic blood pressure between 100-160. Keep MAP greater than 70. Home Toprol and PRN hydralazine for systolic >552  Resp: Wean O2 as able. Humidified air at all times. Trach suctions Q2H to prevent mucus plug and per protocol. Analgesia: Scheduled Tylenol (decreased) scheduled Roxicodone through the NGT/PEG as tolerated  GI: PPI (Peptamen if fish allergy), bowel regimen, dietary consult, Tube feeds at goal.   Advancing tube feed protocol: check residual Q4H. If >300cc, stop TF for 1-4h and recheck residual  If 200cc-300, half the current rate  If 100cc-200, maintain current rate and recheck residual Q4H  If <100cc, advance TF as above  : Maintain nettles, discontinue POD2 (done)  ID: Continue Unasyn Q6H while drains are in place; continue Decadron 8 mg Q8H 3 days postoperatively-done . Drains: Monitor output, stripping with flap checks  Embolic PPX: SCDs while in bed & SubQ Heparin 5000 TID for DVT prophylaxis and assist flap perfusion    PT/OT: encourage out of bed to chair, ambulate as tolerated  Medical management per medicine/SICU, appreciate care  Speech therapy - OK for speaking valve and speaking,  social work, [facility], consults ordered  Dispo: ok for Transfer to [facility] today     Patient seen and examined. Plans discussed with attending physician.       DO Mik  Resident Physician  Otolaryngology  Melita Alvarez 1945 11/18/2022  8:28 AM

## 2022-11-18 NOTE — PLAN OF CARE
Problem: Skin/Tissue Integrity  Goal: Absence of new skin breakdown  Description: 1. Monitor for areas of redness and/or skin breakdown  2. Assess vascular access sites hourly  3. Every 4-6 hours minimum:  Change oxygen saturation probe site  4. Every 4-6 hours:  If on nasal continuous positive airway pressure, respiratory therapy assess nares and determine need for appliance change or resting period.   11/17/2022 2251 by Jen Sheridan RN  Outcome: Progressing     Problem: Safety - Adult  Goal: Free from fall injury  Recent Flowsheet Documentation  Taken 11/18/2022 1206 by Tonny Peralta RN  Free From Fall Injury: Instruct family/caregiver on patient safety  11/17/2022 2251 by Jen Sheridan RN  Outcome: Progressing     Problem: ABCDS Injury Assessment  Goal: Absence of physical injury  Recent Flowsheet Documentation  Taken 11/18/2022 1206 by Tonny Peralta RN  Absence of Physical Injury: Implement safety measures based on patient assessment  11/17/2022 2251 by Jen Sheridan RN  Outcome: Progressing     Problem: Pain  Goal: Verbalizes/displays adequate comfort level or baseline comfort level  11/18/2022 1207 by Tonny Peralta RN  Outcome: Progressing  11/17/2022 2251 by Jen Sheridan RN  Outcome: Progressing     Problem: Neurosensory - Adult  Goal: Achieves maximal functionality and self care  11/17/2022 2251 by Jen Sheridan RN  Outcome: Progressing     Problem: Skin/Tissue Integrity - Adult  Goal: Incisions, wounds, or drain sites healing without S/S of infection  Outcome: Progressing  Flowsheets  Taken 11/18/2022 1206  Incisions, Wounds, or Drain Sites Healing Without Sign and Symptoms of Infection: ADMISSION and DAILY: Assess and document risk factors for pressure ulcer development  Taken 11/18/2022 1030  Incisions, Wounds, or Drain Sites Healing Without Sign and Symptoms of Infection: ADMISSION and DAILY: Assess and document risk factors for pressure ulcer development     Problem: Anxiety  Goal: Will report anxiety at manageable levels  Description: INTERVENTIONS:  1. Administer medication as ordered  2. Teach and rehearse alternative coping skills  3. Provide emotional support with 1:1 interaction with staff  Outcome: Progressing  Flowsheets (Taken 11/18/2022 1030)  Will report anxiety at manageable levels: Teach and rehearse alternative coping skills     Problem: Coping  Goal: Pt/Family able to verbalize concerns and demonstrate effective coping strategies  Description: INTERVENTIONS:  1. Assist patient/family to identify coping skills, available support systems and cultural and spiritual values  2. Provide emotional support, including active listening and acknowledgement of concerns of patient and caregivers  3. Reduce environmental stimuli, as able  4. Instruct patient/family in relaxation techniques, as appropriate  5.  Assess for spiritual pain/suffering and initiate Spiritual Care, Psychosocial Clinical Specialist consults as needed  Outcome: Progressing  Flowsheets (Taken 11/18/2022 1030)  Patient/family able to verbalize anxieties, fears, and concerns, and demonstrate effective coping: Assist patient/family to identify coping skills, available support systems and cultural and spiritual values

## 2022-11-18 NOTE — CARE COORDINATION
Long conversation with patient and spouse today. I did let them know that I spoke with homecare and patient is on for start of care with homecare on Monday evening. Previous cm arranged for supplies to be delivered on Monday at 11am.  Patient unsure if he will be able to manage with spouse support on Monday at home. I did explain to both of them that nursing will continue teaching all weekend long on trach and peg tube care. If they still feel uncomfortable on Monday we can always change to [facility] if that is a better fit. Spouse also tells me she does not feel he would want [facility]. Daughter is a nurse and going to be staying with them all week next week as well. Spoke with nursing, they will work on teaching all weekend. Will leave [facility] list with facilities accepting trachs today at bedside. Also discussed wound care supplies with homecare, they do need a couple days to obtain supplies once they are home, will have nursing send supplies with patient for wound care for 3 days until more can be obtain if patient discharges home. For questions I can be reached at [phone].  Aston Lopez [facility]

## 2022-11-18 NOTE — PROGRESS NOTES
ENT Flap Check     Transferred to 4500. Patient doing well. No respiratory distress, currently on trach mask at 5 L. Able to communicate appropriately. Flap appears well perfused, good capillary refill, no evidence of venous congestion or hematoma. Intraoral doppler checks show good triphasic waves. Will continue to monitor vitals closely. Monitor pain control. Continue Q 4 hour nursing flap checks.      Call ENT with any concerns or questions    Curt Pino DO,  Resident Physician, PGY-2  Department of Otolaryngology, [facility])

## 2022-11-18 NOTE — PROGRESS NOTES
Wife at bedside, attempted to teach trach care and she stated she would like to wait due to it being new still and a lot going on and would like for wait until patient is in new room to start teaching.

## 2022-11-18 NOTE — PROGRESS NOTES
Madison Trejo, patients wife @ [phone] to inform of transfer to [facility]40. Patient's only personal belongings are glasses in which were sent with him. Pt. Reported that wife took his cell phone home. Sterile doppler was sent with patient. Trach supplies also sent. Called central supply to inform them of the new location of the doppler. RN will transport patient to 4500.

## 2022-11-18 NOTE — PROGRESS NOTES
Flap check performed. Pulse was audible via doppler. Color and warmth WDL.     Electronically signed by Justin Muñiz RN on 11/18/2022 at 12:37 PM

## 2022-11-18 NOTE — ACP (ADVANCE CARE PLANNING)
Advance Care Planning   Healthcare Decision Maker:    Primary Decision Maker: Sinai Nash - [phone]    Click here to complete Healthcare Decision Makers including selection of the Healthcare Decision Maker Relationship (ie \"Primary\").

## 2022-11-19 LAB
ALBUMIN SERPL-MCNC: 3.5 G/DL (ref 3.5–5.2)
ALP BLD-CCNC: 100 U/L (ref 40–129)
ALT SERPL-CCNC: 62 U/L (ref 0–40)
ANION GAP SERPL CALCULATED.3IONS-SCNC: 11 MMOL/L (ref 7–16)
AST SERPL-CCNC: 44 U/L (ref 0–39)
BASOPHILS ABSOLUTE: 0.03 E9/L (ref 0–0.2)
BASOPHILS RELATIVE PERCENT: 0.4 % (ref 0–2)
BILIRUB SERPL-MCNC: 0.3 MG/DL (ref 0–1.2)
BUN BLDV-MCNC: 16 MG/DL (ref 6–23)
CALCIUM SERPL-MCNC: 9.3 MG/DL (ref 8.6–10.2)
CHLORIDE BLD-SCNC: 104 MMOL/L (ref 98–107)
CO2: 26 MMOL/L (ref 22–29)
CREAT SERPL-MCNC: 0.7 MG/DL (ref 0.7–1.2)
EOSINOPHILS ABSOLUTE: 0.01 E9/L (ref 0.05–0.5)
EOSINOPHILS RELATIVE PERCENT: 0.1 % (ref 0–6)
GFR SERPL CREATININE-BSD FRML MDRD: >60 ML/MIN/1.73
GLUCOSE BLD-MCNC: 149 MG/DL (ref 74–99)
HCT VFR BLD CALC: 32.3 % (ref 37–54)
HEMOGLOBIN: 10.7 G/DL (ref 12.5–16.5)
IMMATURE GRANULOCYTES #: 0.12 E9/L
IMMATURE GRANULOCYTES %: 1.6 % (ref 0–5)
LYMPHOCYTES ABSOLUTE: 0.89 E9/L (ref 1.5–4)
LYMPHOCYTES RELATIVE PERCENT: 11.5 % (ref 20–42)
MCH RBC QN AUTO: 29.2 PG (ref 26–35)
MCHC RBC AUTO-ENTMCNC: 33.1 % (ref 32–34.5)
MCV RBC AUTO: 88 FL (ref 80–99.9)
MONOCYTES ABSOLUTE: 0.5 E9/L (ref 0.1–0.95)
MONOCYTES RELATIVE PERCENT: 6.5 % (ref 2–12)
NEUTROPHILS ABSOLUTE: 6.18 E9/L (ref 1.8–7.3)
NEUTROPHILS RELATIVE PERCENT: 79.9 % (ref 43–80)
PDW BLD-RTO: 12.5 FL (ref 11.5–15)
PLATELET # BLD: 196 E9/L (ref 130–450)
PMV BLD AUTO: 10.7 FL (ref 7–12)
POTASSIUM SERPL-SCNC: 4 MMOL/L (ref 3.5–5)
RBC # BLD: 3.67 E12/L (ref 3.8–5.8)
SODIUM BLD-SCNC: 141 MMOL/L (ref 132–146)
TOTAL PROTEIN: 6.3 G/DL (ref 6.4–8.3)
WBC # BLD: 7.7 E9/L (ref 4.5–11.5)

## 2022-11-19 PROCEDURE — 36415 COLL VENOUS BLD VENIPUNCTURE: CPT

## 2022-11-19 PROCEDURE — 2580000003 HC RX 258

## 2022-11-19 PROCEDURE — 80053 COMPREHEN METABOLIC PANEL: CPT

## 2022-11-19 PROCEDURE — 6370000000 HC RX 637 (ALT 250 FOR IP)

## 2022-11-19 PROCEDURE — 2060000000 HC ICU INTERMEDIATE R&B

## 2022-11-19 PROCEDURE — 6360000002 HC RX W HCPCS

## 2022-11-19 PROCEDURE — 85025 COMPLETE CBC W/AUTO DIFF WBC: CPT

## 2022-11-19 PROCEDURE — 6370000000 HC RX 637 (ALT 250 FOR IP): Performed by: OTOLARYNGOLOGY

## 2022-11-19 PROCEDURE — 2700000000 HC OXYGEN THERAPY PER DAY

## 2022-11-19 RX ADMIN — ATORVASTATIN CALCIUM 10 MG: 20 TABLET, FILM COATED ORAL at 22:18

## 2022-11-19 RX ADMIN — METOPROLOL TARTRATE 100 MG: 50 TABLET, FILM COATED ORAL at 08:40

## 2022-11-19 RX ADMIN — SODIUM CHLORIDE, POTASSIUM CHLORIDE, SODIUM LACTATE AND CALCIUM CHLORIDE: 600; 310; 30; 20 INJECTION, SOLUTION INTRAVENOUS at 06:00

## 2022-11-19 RX ADMIN — BACITRACIN ZINC: 500 OINTMENT TOPICAL at 15:07

## 2022-11-19 RX ADMIN — Medication 10 MG: at 22:18

## 2022-11-19 RX ADMIN — OXYCODONE HYDROCHLORIDE 5 MG: 5 SOLUTION ORAL at 00:07

## 2022-11-19 RX ADMIN — SENNOSIDES 10 ML: 8.8 SYRUP ORAL at 22:18

## 2022-11-19 RX ADMIN — BACITRACIN ZINC: 500 OINTMENT TOPICAL at 00:00

## 2022-11-19 RX ADMIN — HEPARIN SODIUM 5000 UNITS: 10000 INJECTION INTRAVENOUS; SUBCUTANEOUS at 22:18

## 2022-11-19 RX ADMIN — ACETAMINOPHEN 325 MG: 160 SOLUTION ORAL at 18:22

## 2022-11-19 RX ADMIN — HEPARIN SODIUM 5000 UNITS: 10000 INJECTION INTRAVENOUS; SUBCUTANEOUS at 13:19

## 2022-11-19 RX ADMIN — AMPICILLIN SODIUM AND SULBACTAM SODIUM 3000 MG: 2; 1 INJECTION, POWDER, FOR SOLUTION INTRAMUSCULAR; INTRAVENOUS at 00:06

## 2022-11-19 RX ADMIN — HEPARIN SODIUM 5000 UNITS: 10000 INJECTION INTRAVENOUS; SUBCUTANEOUS at 06:00

## 2022-11-19 RX ADMIN — TETRAHYDROZOLINE HCL 4 DROP: 0.05 SOLUTION/ DROPS OPHTHALMIC at 22:18

## 2022-11-19 RX ADMIN — SODIUM CHLORIDE, PRESERVATIVE FREE 10 ML: 5 INJECTION INTRAVENOUS at 22:18

## 2022-11-19 RX ADMIN — LANSOPRAZOLE 30 MG: KIT at 13:18

## 2022-11-19 RX ADMIN — OXYCODONE HYDROCHLORIDE 5 MG: 5 SOLUTION ORAL at 13:18

## 2022-11-19 RX ADMIN — OXYCODONE HYDROCHLORIDE 5 MG: 5 SOLUTION ORAL at 18:10

## 2022-11-19 RX ADMIN — TETRAHYDROZOLINE HCL 4 DROP: 0.05 SOLUTION/ DROPS OPHTHALMIC at 08:50

## 2022-11-19 RX ADMIN — BACITRACIN ZINC: 500 OINTMENT TOPICAL at 06:04

## 2022-11-19 RX ADMIN — LANSOPRAZOLE 30 MG: KIT at 08:40

## 2022-11-19 RX ADMIN — AMPICILLIN SODIUM AND SULBACTAM SODIUM 3000 MG: 2; 1 INJECTION, POWDER, FOR SOLUTION INTRAMUSCULAR; INTRAVENOUS at 06:00

## 2022-11-19 RX ADMIN — OXYCODONE HYDROCHLORIDE 5 MG: 5 SOLUTION ORAL at 06:01

## 2022-11-19 RX ADMIN — SODIUM CHLORIDE, PRESERVATIVE FREE 10 ML: 5 INJECTION INTRAVENOUS at 08:52

## 2022-11-19 RX ADMIN — ACETAMINOPHEN 325 MG: 160 SOLUTION ORAL at 06:01

## 2022-11-19 RX ADMIN — ACETAMINOPHEN 325 MG: 160 SOLUTION ORAL at 00:06

## 2022-11-19 RX ADMIN — ACETAMINOPHEN 325 MG: 160 SOLUTION ORAL at 13:12

## 2022-11-19 RX ADMIN — AMPICILLIN SODIUM AND SULBACTAM SODIUM 3000 MG: 2; 1 INJECTION, POWDER, FOR SOLUTION INTRAMUSCULAR; INTRAVENOUS at 18:16

## 2022-11-19 RX ADMIN — AMPICILLIN SODIUM AND SULBACTAM SODIUM 3000 MG: 2; 1 INJECTION, POWDER, FOR SOLUTION INTRAMUSCULAR; INTRAVENOUS at 13:15

## 2022-11-19 RX ADMIN — POLYETHYLENE GLYCOL 3350 17 G: 17 POWDER, FOR SOLUTION ORAL at 08:40

## 2022-11-19 ASSESSMENT — PAIN SCALES - GENERAL
PAINLEVEL_OUTOF10: 5
PAINLEVEL_OUTOF10: 0

## 2022-11-19 ASSESSMENT — PAIN DESCRIPTION - ORIENTATION: ORIENTATION: LEFT

## 2022-11-19 ASSESSMENT — PAIN DESCRIPTION - DESCRIPTORS: DESCRIPTORS: ACHING

## 2022-11-19 ASSESSMENT — PAIN DESCRIPTION - LOCATION: LOCATION: WRIST

## 2022-11-19 NOTE — PROGRESS NOTES
0800- Flap check performed. Pulse audible with doppler on flap and carotid. Flap warm/tan colored with sutures intact. Neck incision well approximated with staples c/d/I.    1100- Phys at bedside performed flap check. 1200- Flap check performed. Pulse audible with doppler on flap and carotid. Flap warm/tan colored with sutures intact. Neck incision well approximated with staples c/d/I.    1600- Flap check performed. Pulse audible with doppler on flap and carotid. Flap warm/tan colored with sutures intact.  Neck incision well approximated with staples c/d/I.

## 2022-11-19 NOTE — PROGRESS NOTES
DEPARTMENT OF OTOLARYNGOLOGY - HEAD & NECK SURGERY   PROGRESS NOTE    PATIENT: Lesvai Damian ADMIT PGBI:[id number]/[id number]   ROOM/BED: [address] [place] [address] TODAY:November 19, 2022     SUBJECTIVE:    No acute event overnight  Alert and oriented  Pain is well controlled  Tolerating tube feeds   Using speaking valve well        Patient's past medical and surgical history, medications, allergies, labs and imagings were reviewed. OBJECTIVE:                                                                                                                              Physical Exam  Constitutional: Appears awake, alert, cooperative, no apparent distress   Eyes: Lids and lashes normal, pupils equal, round and reactive to light,extra ocular muscles intact, sclera clear, conjunctiva normal   HENT: Doppler sounds with arterial waveform at marked site. Flap appears pink without any increased swelling or signs of venous congestion. Soft, warm to touch with normal capillary refills. Incisions are clean, moist and intact. Neck:  Supple, soft and flat without evidence of hematoma or fluid collection. Incisions clean, moist and intact. Shiley 6-0 cuffed trach midline and intact. Few secretions. Extremities Flap donor extremity warm with normal capillary refills, strong distal pulses, intact sensation and movement. No cyanosis or edema. Skin graft donor site with tegaderm/Suresite in place   Cardiovascular: Regular rate   Neurologic:  Cranial nerves II-XII are grossly intact. Marginal Mandibular nerve weak, HB III/IV     Drains: All drains holding suction with serosanguinous output (stripped)  Drain (L lateral neck): 15 cc/24h  Drain (L medial neck): 35 cc/24h  Drain (L arm): 5 cc/24h     Output by Drain (mL) 11/17/22 0701 - 11/17/22 1900 11/17/22 1901 - 11/18/22 0700 11/18/22 0701 - 11/18/22 1900 11/18/22 1901 - 11/19/22 0700 11/19/22 0701 - 11/19/22 1125   Closed/Suction Drain Left; Anterior Neck Bulb 10 20  12    Closed/Suction Drain Left;Anterior Neck Bulb 40 70  35    Closed/Suction Drain Left; Anterior Elbow Bulb 10 15  15           Vitals:    11/18/22 2044 11/19/22 0631 11/19/22 0800 11/19/22 0840   BP: (!) 169/89  (!) 127/106 (!) 127/106   Pulse: 98  90 90   Resp: 16 18 18    Temp: 99.4 °F (37.4 °C)  98.4 °F (36.9 °C)    TempSrc: Axillary  Axillary    SpO2: 91%  98%    Weight:       Height:         24 HR INTAKE/OUTPUT:    Intake/Output Summary (Last 24 hours) at 11/19/2022 1125  Last data filed at 11/19/2022 0845  Gross per 24 hour   Intake 1524.36 ml   Output 462 ml   Net 1062.36 ml       8HR INTAKE OUTPUT:  In: 1524.4 [I.V.:1231.2]  Out: -     Recent Results (from the past 24 hour(s))   CBC with Auto Differential    Collection Time: 11/19/22  4:56 AM   Result Value Ref Range    WBC 7.7 4.5 - 11.5 E9/L    RBC 3.67 (L) 3.80 - 5.80 E12/L    Hemoglobin 10.7 (L) 12.5 - 16.5 g/dL    Hematocrit 32.3 (L) 37.0 - 54.0 %    MCV 88.0 80.0 - 99.9 fL    MCH 29.2 26.0 - 35.0 pg    MCHC 33.1 32.0 - 34.5 %    RDW 12.5 11.5 - 15.0 fL    Platelets 728 327 - 629 E9/L    MPV 10.7 7.0 - 12.0 fL    Neutrophils % 79.9 43.0 - 80.0 %    Immature Granulocytes % 1.6 0.0 - 5.0 %    Lymphocytes % 11.5 (L) 20.0 - 42.0 %    Monocytes % 6.5 2.0 - 12.0 %    Eosinophils % 0.1 0.0 - 6.0 %    Basophils % 0.4 0.0 - 2.0 %    Neutrophils Absolute 6.18 1.80 - 7.30 E9/L    Immature Granulocytes # 0.12 E9/L    Lymphocytes Absolute 0.89 (L) 1.50 - 4.00 E9/L    Monocytes Absolute 0.50 0.10 - 0.95 E9/L    Eosinophils Absolute 0.01 (L) 0.05 - 0.50 E9/L    Basophils Absolute 0.03 0.00 - 0.20 E9/L   Comprehensive Metabolic Panel    Collection Time: 11/19/22  4:56 AM   Result Value Ref Range    Sodium 141 132 - 146 mmol/L    Potassium 4.0 3.5 - 5.0 mmol/L    Chloride 104 98 - 107 mmol/L    CO2 26 22 - 29 mmol/L    Anion Gap 11 7 - 16 mmol/L    Glucose 149 (H) 74 - 99 mg/dL    BUN 16 6 - 23 mg/dL    Creatinine 0.7 0.7 - 1.2 mg/dL    Est, Glom Filt Rate >60 >=60 mL/min/1.73    Calcium 9.3 8.6 - 10.2 mg/dL    Total Protein 6.3 (L) 6.4 - 8.3 g/dL    Albumin 3.5 3.5 - 5.2 g/dL    Total Bilirubin 0.3 0.0 - 1.2 mg/dL    Alkaline Phosphatase 100 40 - 129 U/L    ALT 62 (H) 0 - 40 U/L    AST 44 (H) 0 - 39 U/L         Problem List Items Addressed This Visit          Digestive    Cancer of oral cavity (HCC)    Relevant Medications    oxyCODONE (ROXICODONE) 5 MG/5ML solution 5 mg    acetaminophen (TYLENOL) 160 MG/5ML solution 325 mg    Other Relevant Orders    Surgical Pathology    Surgical Pathology    Surgical Pathology    Surgical Pathology    Surgical Pathology    Surgical Pathology       Other    Tongue mass    Relevant Orders    Surgical Pathology    Surgical Pathology    Surgical Pathology    Surgical Pathology    Surgical Pathology    Surgical Pathology     Other Visit Diagnoses       Pre-operative laboratory examination    -  Primary    Relevant Orders    CBC (Completed)    Type and Screen (Completed)    EKG 12 Lead    Culture, MRSA, Screening (Completed)               ASSESSMENT:  69 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 PLAN:   Flap: Continue Q4H flap checks with doppler per nursing. Flap donor extremity must be elevated at all times. Please call on-call ENT resident for any change in color in flap color, doppler sounds, or increased swelling/firmness at the related flap or donor site. Gentle intraoral suctioning. CV: Discontinue arterial line POD1 - done. Maintain systolic blood pressure between 100-160. Keep MAP greater than 70. Home Toprol and PRN hydralazine for systolic >598  Resp: Wean O2 as able. Humidified air at all times. Trach suctions Q2H PRN to prevent mucus plug and per protocol.    Analgesia: Scheduled Tylenol (decreased) scheduled Roxicodone through the NGT/PEG as tolerated  GI: PPI (Peptamen if fish allergy), bowel regimen, dietary consult, Tube feeds at goal.   Advancing tube feed protocol: check residual Q4H. If >300cc, stop TF for 1-4h and recheck residual  If 200cc-300, half the current rate  If 100cc-200, maintain current rate and recheck residual Q4H  If <100cc, advance TF as above  : Maintain nettles, discontinue POD2 (done)  ID: Continue Unasyn Q6H while drains are in place; continue Decadron 8 mg Q8H 3 days postoperatively-done . Drains: Monitor output, stripping with flap checks  Embolic PPX: SCDs while in bed & SubQ Heparin 5000 TID for DVT prophylaxis and assist flap perfusion    PT/OT: encourage out of bed to chair, ambulate as tolerated  Medical management per medicine/SICU, appreciate care  Speech therapy - OK for speaking valve and speaking,  social work, [facility], consults ordered  Dispo: 4500, anticipate discharge 11/21. Patient seen and examined. Plans discussed with attending physician.       Xiao Hardy DO  Resident Physician  Otolaryngology  Melita Alvarez 1945 11/19/2022  11:25 AM

## 2022-11-19 NOTE — PLAN OF CARE
Problem: Discharge Planning  Goal: Discharge to home or other facility with appropriate resources  Outcome: Progressing     Problem: Skin/Tissue Integrity  Goal: Absence of new skin breakdown  Description: 1. Monitor for areas of redness and/or skin breakdown  2. Assess vascular access sites hourly  3. Every 4-6 hours minimum:  Change oxygen saturation probe site  4. Every 4-6 hours:  If on nasal continuous positive airway pressure, respiratory therapy assess nares and determine need for appliance change or resting period.   Outcome: Progressing     Problem: ABCDS Injury Assessment  Goal: Absence of physical injury  Outcome: Progressing     Problem: Pain  Goal: Verbalizes/displays adequate comfort level or baseline comfort level  Outcome: Progressing     Problem: Safety - Adult  Goal: Free from fall injury  Outcome: Progressing     Problem: Neurosensory - Adult  Goal: Achieves maximal functionality and self care  Outcome: Progressing     Problem: Cardiovascular - Adult  Goal: Maintains optimal cardiac output and hemodynamic stability  Outcome: Progressing     Problem: Skin/Tissue Integrity - Adult  Goal: Incisions, wounds, or drain sites healing without S/S of infection  Outcome: Progressing     Problem: Musculoskeletal - Adult  Goal: Return ADL status to a safe level of function  Outcome: Progressing     Problem: Gastrointestinal - Adult  Goal: Maintains adequate nutritional intake  Outcome: Progressing     Problem: Genitourinary - Adult  Goal: Absence of urinary retention  Outcome: Progressing     Problem: Infection - Adult  Goal: Absence of infection during hospitalization  Outcome: Progressing     Problem: Metabolic/Fluid and Electrolytes - Adult  Goal: Electrolytes maintained within normal limits  Outcome: Progressing     Problem: Hematologic - Adult  Goal: Maintains hematologic stability  Outcome: Progressing     Problem: Anxiety  Goal: Will report anxiety at manageable levels  Description: INTERVENTIONS:  1. Administer medication as ordered  2. Teach and rehearse alternative coping skills  3. Provide emotional support with 1:1 interaction with staff  Outcome: Progressing     Problem: Coping  Goal: Pt/Family able to verbalize concerns and demonstrate effective coping strategies  Description: INTERVENTIONS:  1. Assist patient/family to identify coping skills, available support systems and cultural and spiritual values  2. Provide emotional support, including active listening and acknowledgement of concerns of patient and caregivers  3. Reduce environmental stimuli, as able  4. Instruct patient/family in relaxation techniques, as appropriate  5.  Assess for spiritual pain/suffering and initiate Spiritual Care, Psychosocial Clinical Specialist consults as needed  Outcome: Progressing     Problem: Nutrition Deficit:  Goal: Optimize nutritional status  Outcome: Progressing

## 2022-11-20 LAB
ALBUMIN SERPL-MCNC: 3.8 G/DL (ref 3.5–5.2)
ALP BLD-CCNC: 98 U/L (ref 40–129)
ALT SERPL-CCNC: 71 U/L (ref 0–40)
ANION GAP SERPL CALCULATED.3IONS-SCNC: 11 MMOL/L (ref 7–16)
AST SERPL-CCNC: 56 U/L (ref 0–39)
BASOPHILS ABSOLUTE: 0.03 E9/L (ref 0–0.2)
BASOPHILS RELATIVE PERCENT: 0.4 % (ref 0–2)
BILIRUB SERPL-MCNC: 0.5 MG/DL (ref 0–1.2)
BUN BLDV-MCNC: 15 MG/DL (ref 6–23)
CALCIUM SERPL-MCNC: 9.4 MG/DL (ref 8.6–10.2)
CHLORIDE BLD-SCNC: 101 MMOL/L (ref 98–107)
CO2: 27 MMOL/L (ref 22–29)
CREAT SERPL-MCNC: 0.8 MG/DL (ref 0.7–1.2)
EOSINOPHILS ABSOLUTE: 0.02 E9/L (ref 0.05–0.5)
EOSINOPHILS RELATIVE PERCENT: 0.2 % (ref 0–6)
GFR SERPL CREATININE-BSD FRML MDRD: >60 ML/MIN/1.73
GLUCOSE BLD-MCNC: 109 MG/DL (ref 74–99)
HCT VFR BLD CALC: 31.6 % (ref 37–54)
HEMOGLOBIN: 10.5 G/DL (ref 12.5–16.5)
IMMATURE GRANULOCYTES #: 0.23 E9/L
IMMATURE GRANULOCYTES %: 2.8 % (ref 0–5)
LYMPHOCYTES ABSOLUTE: 1.09 E9/L (ref 1.5–4)
LYMPHOCYTES RELATIVE PERCENT: 13.5 % (ref 20–42)
MCH RBC QN AUTO: 29.1 PG (ref 26–35)
MCHC RBC AUTO-ENTMCNC: 33.2 % (ref 32–34.5)
MCV RBC AUTO: 87.5 FL (ref 80–99.9)
MONOCYTES ABSOLUTE: 0.54 E9/L (ref 0.1–0.95)
MONOCYTES RELATIVE PERCENT: 6.7 % (ref 2–12)
NEUTROPHILS ABSOLUTE: 6.18 E9/L (ref 1.8–7.3)
NEUTROPHILS RELATIVE PERCENT: 76.4 % (ref 43–80)
PDW BLD-RTO: 12.6 FL (ref 11.5–15)
PLATELET # BLD: 240 E9/L (ref 130–450)
PMV BLD AUTO: 10.3 FL (ref 7–12)
POTASSIUM SERPL-SCNC: 4.2 MMOL/L (ref 3.5–5)
RBC # BLD: 3.61 E12/L (ref 3.8–5.8)
SODIUM BLD-SCNC: 139 MMOL/L (ref 132–146)
TOTAL PROTEIN: 6.3 G/DL (ref 6.4–8.3)
WBC # BLD: 8.1 E9/L (ref 4.5–11.5)

## 2022-11-20 PROCEDURE — 6360000002 HC RX W HCPCS

## 2022-11-20 PROCEDURE — 2700000000 HC OXYGEN THERAPY PER DAY

## 2022-11-20 PROCEDURE — 6370000000 HC RX 637 (ALT 250 FOR IP)

## 2022-11-20 PROCEDURE — 2580000003 HC RX 258

## 2022-11-20 PROCEDURE — 36415 COLL VENOUS BLD VENIPUNCTURE: CPT

## 2022-11-20 PROCEDURE — 80053 COMPREHEN METABOLIC PANEL: CPT

## 2022-11-20 PROCEDURE — 85025 COMPLETE CBC W/AUTO DIFF WBC: CPT

## 2022-11-20 PROCEDURE — 31502 CHANGE OF WINDPIPE AIRWAY: CPT

## 2022-11-20 PROCEDURE — 2060000000 HC ICU INTERMEDIATE R&B

## 2022-11-20 PROCEDURE — 6370000000 HC RX 637 (ALT 250 FOR IP): Performed by: OTOLARYNGOLOGY

## 2022-11-20 RX ORDER — OXYCODONE HCL 5 MG/5 ML
5 SOLUTION, ORAL ORAL EVERY 6 HOURS PRN
Status: DISCONTINUED | OUTPATIENT
Start: 2022-11-20 | End: 2022-11-21 | Stop reason: HOSPADM

## 2022-11-20 RX ADMIN — ACETAMINOPHEN 325 MG: 160 SOLUTION ORAL at 00:55

## 2022-11-20 RX ADMIN — BACITRACIN ZINC: 500 OINTMENT TOPICAL at 07:45

## 2022-11-20 RX ADMIN — HEPARIN SODIUM 5000 UNITS: 10000 INJECTION INTRAVENOUS; SUBCUTANEOUS at 06:23

## 2022-11-20 RX ADMIN — AMPICILLIN SODIUM AND SULBACTAM SODIUM 3000 MG: 2; 1 INJECTION, POWDER, FOR SOLUTION INTRAMUSCULAR; INTRAVENOUS at 00:55

## 2022-11-20 RX ADMIN — OXYCODONE HYDROCHLORIDE 5 MG: 5 SOLUTION ORAL at 06:22

## 2022-11-20 RX ADMIN — ACETAMINOPHEN 325 MG: 160 SOLUTION ORAL at 12:49

## 2022-11-20 RX ADMIN — TETRAHYDROZOLINE HCL 4 DROP: 0.05 SOLUTION/ DROPS OPHTHALMIC at 21:46

## 2022-11-20 RX ADMIN — HEPARIN SODIUM 5000 UNITS: 10000 INJECTION INTRAVENOUS; SUBCUTANEOUS at 21:45

## 2022-11-20 RX ADMIN — POLYETHYLENE GLYCOL 3350 17 G: 17 POWDER, FOR SOLUTION ORAL at 09:22

## 2022-11-20 RX ADMIN — ATORVASTATIN CALCIUM 10 MG: 20 TABLET, FILM COATED ORAL at 21:45

## 2022-11-20 RX ADMIN — SODIUM CHLORIDE, POTASSIUM CHLORIDE, SODIUM LACTATE AND CALCIUM CHLORIDE: 600; 310; 30; 20 INJECTION, SOLUTION INTRAVENOUS at 00:54

## 2022-11-20 RX ADMIN — LANSOPRAZOLE 30 MG: KIT at 09:22

## 2022-11-20 RX ADMIN — METOPROLOL TARTRATE 100 MG: 50 TABLET, FILM COATED ORAL at 09:23

## 2022-11-20 RX ADMIN — SODIUM CHLORIDE, PRESERVATIVE FREE 10 ML: 5 INJECTION INTRAVENOUS at 09:24

## 2022-11-20 RX ADMIN — HEPARIN SODIUM 5000 UNITS: 10000 INJECTION INTRAVENOUS; SUBCUTANEOUS at 14:30

## 2022-11-20 RX ADMIN — SODIUM CHLORIDE, PRESERVATIVE FREE 10 ML: 5 INJECTION INTRAVENOUS at 21:45

## 2022-11-20 RX ADMIN — TETRAHYDROZOLINE HCL 4 DROP: 0.05 SOLUTION/ DROPS OPHTHALMIC at 09:25

## 2022-11-20 RX ADMIN — ACETAMINOPHEN 325 MG: 160 SOLUTION ORAL at 06:22

## 2022-11-20 RX ADMIN — BACITRACIN ZINC: 500 OINTMENT TOPICAL at 14:31

## 2022-11-20 RX ADMIN — OXYCODONE HYDROCHLORIDE 5 MG: 5 SOLUTION ORAL at 00:55

## 2022-11-20 RX ADMIN — AMPICILLIN SODIUM AND SULBACTAM SODIUM 3000 MG: 2; 1 INJECTION, POWDER, FOR SOLUTION INTRAMUSCULAR; INTRAVENOUS at 18:43

## 2022-11-20 RX ADMIN — BACITRACIN ZINC: 500 OINTMENT TOPICAL at 21:46

## 2022-11-20 RX ADMIN — AMPICILLIN SODIUM AND SULBACTAM SODIUM 3000 MG: 2; 1 INJECTION, POWDER, FOR SOLUTION INTRAMUSCULAR; INTRAVENOUS at 12:53

## 2022-11-20 RX ADMIN — LANSOPRAZOLE 30 MG: KIT at 14:31

## 2022-11-20 RX ADMIN — ACETAMINOPHEN 325 MG: 160 SOLUTION ORAL at 18:31

## 2022-11-20 RX ADMIN — SENNOSIDES 10 ML: 8.8 SYRUP ORAL at 21:46

## 2022-11-20 RX ADMIN — Medication 10 MG: at 21:45

## 2022-11-20 RX ADMIN — BACITRACIN ZINC: 500 OINTMENT TOPICAL at 00:58

## 2022-11-20 RX ADMIN — AMPICILLIN SODIUM AND SULBACTAM SODIUM 3000 MG: 2; 1 INJECTION, POWDER, FOR SOLUTION INTRAMUSCULAR; INTRAVENOUS at 06:22

## 2022-11-20 ASSESSMENT — PAIN DESCRIPTION - LOCATION: LOCATION: WRIST

## 2022-11-20 ASSESSMENT — PAIN SCALES - GENERAL
PAINLEVEL_OUTOF10: 0
PAINLEVEL_OUTOF10: 0
PAINLEVEL_OUTOF10: 5

## 2022-11-20 ASSESSMENT — PAIN DESCRIPTION - ORIENTATION: ORIENTATION: LEFT

## 2022-11-20 ASSESSMENT — PAIN DESCRIPTION - DESCRIPTORS: DESCRIPTORS: ACHING

## 2022-11-20 NOTE — DISCHARGE INSTRUCTIONS
Otolaryngology (ENT) Post-Op Instructions    Follow-up with Dr. Monalisa Jackson  in 1 week(s). Please call office to schedule and confirm your appointment. Please follow the instructions below:    DIET INSTRUCTIONS:  Bolus feeds 4 times daily. 240cc with 30cc water flushes before and after meals. Stool softeners until regular bowel movements        ACTIVITY INSTRUCTIONS:  Increase activity as tolerated    Elevate Head of bed   No heavy lifting or strenuous activity     No driving while taking pain medication      WOUND/DRESSING INSTRUCTIONS:  May shower, but avoid baths, swimming pools or hot tubes until your wound is healed   Apply a layer of Bacitracin ointment to your wound at least thrice a day. Keep wound(s) clean and ALWAYS moist with ointment. You may clean your wound with baby wipe or gently let water run over area in shower. Gently dab area to dry and dress wound with Bacitracin ointment   You may also cover your wound(s) with hydrogels, hydrofibers, alginates, or soft silicone dressing  For best wound-healing and cosmetic results:   Use sunscreen of at least SPF 30 on your wound(s) when outdoor  Again, always keep wound(s) moist or wet with ointment  Sutures/Staples to be removed in 1 week     MEDICATION INSTRUCTIONS:  Take medication as prescribed. When taking pain medications, you may experience dizziness or drowsiness. Do not drink alcohol or drive when taking these medications. You may take Ibuprofen (over the counter) as per directions for mild pain. Do not take any other acetaminophen (Tylenol) products while taking your pain medication. You may experience constipation while taking pain medication - You may take over the counter stool softeners: docuscate (Colace) or sennosides S (Senokot - S).      Call physician for any of the following or for questions/concerns:   Fever over 101° F    Redness, swelling, hardness or warmth at the wound site (s)  Unrelieved nausea/vomiting    Foul smelling or cloudy drainage at the wound site (s)   Unrelieved pain or increase in pain     Increase in shortness of breath       Drain Care  What is the drain for? Your drain is to stop fluid from building up under the skin. It also helps your incision to heal.  Your doctor will take the drain out when there is less and less fluid coming out. What supplies do I need? A cup for the fluid to be emptied into and to measure the fluid. The chart to record the amount of fluid (if your surgeon tells you to do so). 4 x 4 gauze  Tape   Safety pin  Antibiotic ointment (available over the counter)    Drain care:  Wash you hands before you change the dressing or empty the drain. This is important to prevent infection. Change the dressing to the drain tube site daily. Apply a small amount of antibiotic ointment to the skin at the drain tube site with each dressing change. How do I empty the drain? Hold the plastic bulb in an upright position with one hand and take the cap off with the other hand. With the bulb in one hand and the cup in the other, turn the bulb over and place the end into the cup. Squeeze gently and empty the fluid into the cup. Squeeze the bulb tightly with one hand (to flatten it as much as possible) and recap it with the other hand. This starts the suction again inside of the bulb. Do not squeeze the bulb if the cap is on. Record the amount of drainage if your physician has asked you to do so. Discard the drainage into the toilet. Rinse the cup so it is clean and ready to be used again the next time. Wash your hands. Re-pin the tape tab of the drain tube back to your clothing. The bulb should always be lower than your incision. This will prevent drainage from flowing back into the tubing and the incision. How often do I need to empty my drain? Usually you empty the drain when it is half full.   Most find they need to empty the drain 3 times a day- when you wake up in the morning, mid day, and before bed. If the bulb fills up more than this, you may need to empty the drain more often. How much drainage should there be? The amount of drainage may vary from day to day. It should be less each day. If you increase your activity, it may increase the amount of drainage, temporarily. What color should the drainage be? The color will vary. It may go from bright red to pink, then to clear yellow. What do I do with my drainage record? Take it to your follow up visit with your surgeon. May I shower? Yes, It may help to secure the drain to an old cloth/robe belt that is around your waist.     When should I call the doctor? Call your doctor if:  You have an elevated temperature (greater than 101 or higher)  The drainage increases or stops suddenly  The drain stitches come loose or break, or if the drain comes out  The area on your skin around the drain gets red, swollen, or painful  The fluid coming out of the drain changes (has pus in it, becomes bright red, or has a bad smell).

## 2022-11-20 NOTE — PROGRESS NOTES
DEPARTMENT OF OTOLARYNGOLOGY - HEAD & NECK SURGERY   PROGRESS NOTE    PATIENT: Koki Chairez ADMIT PGHS:89/93/5246   ROOM/BED: 4507/[facility] TODAY:November 20, 2022     SUBJECTIVE:    No acute events overnight, some confusion  Alert and oriented  Pain is well controlled, will change pain meds to PRN due to confusion at nighttime. Tolerating bolus tube feeds. No BM since transfer to 4500. Using speaking valve well  Trach changed to 6-0 cuffless. Arm cast taken down, redressed with xeroform/abd pad/ace wrap. Patient's past medical and surgical history, medications, allergies, labs and imagings were reviewed. OBJECTIVE:                                                                                                                              Physical Exam  Constitutional: Appears awake, alert, cooperative, no apparent distress   Eyes: Lids and lashes normal, pupils equal, round and reactive to light,extra ocular muscles intact, sclera clear, conjunctiva normal   HENT: Doppler sounds with arterial waveform at marked site. Flap appears pink without any increased swelling or signs of venous congestion. Soft, warm to touch with normal capillary refills. Incisions are clean, moist and intact. Neck:  Supple, soft and flat without evidence of hematoma or fluid collection. Incisions clean, moist and intact. Shiley 6-0 cuffed trach midline and intact. Minimal secretions. Extremities Flap donor extremity warm with normal capillary refills, strong distal pulses, intact sensation and movement. No cyanosis or edema. Skin graft donor site with tegaderm/Suresite in place   Cardiovascular: Regular rate   Neurologic:  Cranial nerves II-XII are grossly intact. Marginal Mandibular nerve weak, HB III/IV     Drains:  All drains holding suction with serosanguinous output (stripped)  Drain (L lateral neck): 3 cc/24h  Drain (L medial neck): 30 cc/24h  Drain (L arm): 5 cc/24h           Output by Drain (mL) 11/18/22 0701 - 11/18/22 1900 11/18/22 1901 - 11/19/22 0700 11/19/22 0701 - 11/19/22 1900 11/19/22 1901 - 11/20/22 0700 11/20/22 0701 - 11/20/22 0907   Closed/Suction Drain Left; Anterior Neck Bulb  12 5 0    Closed/Suction Drain Left; Anterior Neck Bulb  35 3 0           Vitals:    11/19/22 1840 11/19/22 2216 11/20/22 0652 11/20/22 0730   BP:  (!) 149/83  135/67   Pulse:  87  (!) 108   Resp: 18 18 18 18   Temp:  98.2 °F (36.8 °C)  97.7 °F (36.5 °C)   TempSrc:  Axillary  Axillary   SpO2:  96%  98%   Weight:       Height:         24 HR INTAKE/OUTPUT:    Intake/Output Summary (Last 24 hours) at 11/20/2022 0907  Last data filed at 11/20/2022 8684  Gross per 24 hour   Intake 1589.76 ml   Output 488 ml   Net 1101.76 ml       8HR INTAKE OUTPUT:  No intake/output data recorded.     Recent Results (from the past 24 hour(s))   CBC with Auto Differential    Collection Time: 11/20/22  4:36 AM   Result Value Ref Range    WBC 8.1 4.5 - 11.5 E9/L    RBC 3.61 (L) 3.80 - 5.80 E12/L    Hemoglobin 10.5 (L) 12.5 - 16.5 g/dL    Hematocrit 31.6 (L) 37.0 - 54.0 %    MCV 87.5 80.0 - 99.9 fL    MCH 29.1 26.0 - 35.0 pg    MCHC 33.2 32.0 - 34.5 %    RDW 12.6 11.5 - 15.0 fL    Platelets 470 903 - 079 E9/L    MPV 10.3 7.0 - 12.0 fL    Neutrophils % 76.4 43.0 - 80.0 %    Immature Granulocytes % 2.8 0.0 - 5.0 %    Lymphocytes % 13.5 (L) 20.0 - 42.0 %    Monocytes % 6.7 2.0 - 12.0 %    Eosinophils % 0.2 0.0 - 6.0 %    Basophils % 0.4 0.0 - 2.0 %    Neutrophils Absolute 6.18 1.80 - 7.30 E9/L    Immature Granulocytes # 0.23 E9/L    Lymphocytes Absolute 1.09 (L) 1.50 - 4.00 E9/L    Monocytes Absolute 0.54 0.10 - 0.95 E9/L    Eosinophils Absolute 0.02 (L) 0.05 - 0.50 E9/L    Basophils Absolute 0.03 0.00 - 0.20 E9/L   Comprehensive Metabolic Panel    Collection Time: 11/20/22  4:36 AM   Result Value Ref Range    Sodium 139 132 - 146 mmol/L    Potassium 4.2 3.5 - 5.0 mmol/L    Chloride 101 98 - 107 mmol/L    CO2 27 22 - 29 mmol/L    Anion Gap 11 7 - 16 mmol/L Glucose 109 (H) 74 - 99 mg/dL    BUN 15 6 - 23 mg/dL    Creatinine 0.8 0.7 - 1.2 mg/dL    Est, Glom Filt Rate >60 >=60 mL/min/1.73    Calcium 9.4 8.6 - 10.2 mg/dL    Total Protein 6.3 (L) 6.4 - 8.3 g/dL    Albumin 3.8 3.5 - 5.2 g/dL    Total Bilirubin 0.5 0.0 - 1.2 mg/dL    Alkaline Phosphatase 98 40 - 129 U/L    ALT 71 (H) 0 - 40 U/L    AST 56 (H) 0 - 39 U/L         Problem List Items Addressed This Visit          Digestive    Cancer of oral cavity (HCC)    Relevant Medications    oxyCODONE (ROXICODONE) 5 MG/5ML solution 5 mg    acetaminophen (TYLENOL) 160 MG/5ML solution 325 mg    Other Relevant Orders    Surgical Pathology    Surgical Pathology    Surgical Pathology    Surgical Pathology    Surgical Pathology    Surgical Pathology       Other    Tongue mass    Relevant Orders    Surgical Pathology    Surgical Pathology    Surgical Pathology    Surgical Pathology    Surgical Pathology    Surgical Pathology     Other Visit Diagnoses       Pre-operative laboratory examination    -  Primary    Relevant Orders    CBC (Completed)    Type and Screen (Completed)    EKG 12 Lead    Culture, MRSA, Screening (Completed)               ASSESSMENT:  69 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 PLAN:   Flap: Continue Q4H flap checks with doppler per nursing. Please call on-call ENT resident for any change in color in flap color, doppler sounds, or increased swelling/firmness at the related flap or donor site. Gentle intraoral suctioning. CV: Discontinue arterial line POD1 - done. Maintain systolic blood pressure between 100-160. Keep MAP greater than 70. Home Toprol and PRN hydralazine for systolic >070  Resp: Wean O2 as able. Humidified air at all times. Trach suctions Q2H PRN to prevent mucus plug and per protocol.    Analgesia: Scheduled Tylenol (decreased) PRN Roxicodone through the NGT/PEG as tolerated  GI: PPI (Peptamen if fish allergy), bowel regimen, dietary consult, Tube feeds at goal. Now with bolus feeds  Advancing tube feed protocol: check residual Q4H. If >300cc, stop TF for 1-4h and recheck residual  If 200cc-300, half the current rate  If 100cc-200, maintain current rate and recheck residual Q4H  If <100cc, advance TF as above  : Maintain nettles, discontinue POD2 (done)  ID: Continue Unasyn Q6H while drains are in place; continue Decadron 8 mg Q8H 3 days postoperatively-done . Drains: Monitor output, stripping with flap checks  Embolic PPX: SCDs while in bed & SubQ Heparin 5000 TID for DVT prophylaxis and assist flap perfusion    PT/OT: encourage out of bed to chair, ambulate as tolerated  Medical management per medicine/SICU, appreciate care  Speech therapy - OK for speaking valve and speaking,  social work, [facility]  Dispo: 4500, anticipate discharge 11/21. Patient seen and examined. Plans discussed with attending physician.       Alexey Longoria DO  Resident Physician  Otolaryngology  Melita Alvarez 1945   11/20/2022  9:07 AM

## 2022-11-21 VITALS
HEIGHT: 70 IN | TEMPERATURE: 98.1 F | RESPIRATION RATE: 18 BRPM | WEIGHT: 226.8 LBS | SYSTOLIC BLOOD PRESSURE: 128 MMHG | HEART RATE: 100 BPM | DIASTOLIC BLOOD PRESSURE: 70 MMHG | BODY MASS INDEX: 32.47 KG/M2 | OXYGEN SATURATION: 98 %

## 2022-11-21 LAB
ALBUMIN SERPL-MCNC: 3.5 G/DL (ref 3.5–5.2)
ALP BLD-CCNC: 100 U/L (ref 40–129)
ALT SERPL-CCNC: 71 U/L (ref 0–40)
ANION GAP SERPL CALCULATED.3IONS-SCNC: 11 MMOL/L (ref 7–16)
AST SERPL-CCNC: 49 U/L (ref 0–39)
BASOPHILS ABSOLUTE: 0.04 E9/L (ref 0–0.2)
BASOPHILS RELATIVE PERCENT: 0.5 % (ref 0–2)
BILIRUB SERPL-MCNC: 0.4 MG/DL (ref 0–1.2)
BUN BLDV-MCNC: 18 MG/DL (ref 6–23)
CALCIUM SERPL-MCNC: 9.2 MG/DL (ref 8.6–10.2)
CHLORIDE BLD-SCNC: 104 MMOL/L (ref 98–107)
CO2: 25 MMOL/L (ref 22–29)
CREAT SERPL-MCNC: 0.8 MG/DL (ref 0.7–1.2)
EOSINOPHILS ABSOLUTE: 0.02 E9/L (ref 0.05–0.5)
EOSINOPHILS RELATIVE PERCENT: 0.3 % (ref 0–6)
GFR SERPL CREATININE-BSD FRML MDRD: >60 ML/MIN/1.73
GLUCOSE BLD-MCNC: 117 MG/DL (ref 74–99)
HCT VFR BLD CALC: 32.7 % (ref 37–54)
HEMOGLOBIN: 10.8 G/DL (ref 12.5–16.5)
IMMATURE GRANULOCYTES #: 0.25 E9/L
IMMATURE GRANULOCYTES %: 3.1 % (ref 0–5)
LYMPHOCYTES ABSOLUTE: 1.05 E9/L (ref 1.5–4)
LYMPHOCYTES RELATIVE PERCENT: 13.2 % (ref 20–42)
MCH RBC QN AUTO: 29 PG (ref 26–35)
MCHC RBC AUTO-ENTMCNC: 33 % (ref 32–34.5)
MCV RBC AUTO: 87.9 FL (ref 80–99.9)
MONOCYTES ABSOLUTE: 0.59 E9/L (ref 0.1–0.95)
MONOCYTES RELATIVE PERCENT: 7.4 % (ref 2–12)
NEUTROPHILS ABSOLUTE: 6 E9/L (ref 1.8–7.3)
NEUTROPHILS RELATIVE PERCENT: 75.5 % (ref 43–80)
PDW BLD-RTO: 12.7 FL (ref 11.5–15)
PLATELET # BLD: 241 E9/L (ref 130–450)
PMV BLD AUTO: 9.8 FL (ref 7–12)
POTASSIUM SERPL-SCNC: 4.3 MMOL/L (ref 3.5–5)
RBC # BLD: 3.72 E12/L (ref 3.8–5.8)
SODIUM BLD-SCNC: 140 MMOL/L (ref 132–146)
TOTAL PROTEIN: 6.4 G/DL (ref 6.4–8.3)
WBC # BLD: 8 E9/L (ref 4.5–11.5)

## 2022-11-21 PROCEDURE — 92507 TX SP LANG VOICE COMM INDIV: CPT | Performed by: SPEECH-LANGUAGE PATHOLOGIST

## 2022-11-21 PROCEDURE — 2580000003 HC RX 258

## 2022-11-21 PROCEDURE — 6370000000 HC RX 637 (ALT 250 FOR IP)

## 2022-11-21 PROCEDURE — 99024 POSTOP FOLLOW-UP VISIT: CPT | Performed by: OTOLARYNGOLOGY

## 2022-11-21 PROCEDURE — 85025 COMPLETE CBC W/AUTO DIFF WBC: CPT

## 2022-11-21 PROCEDURE — 36415 COLL VENOUS BLD VENIPUNCTURE: CPT

## 2022-11-21 PROCEDURE — 6360000002 HC RX W HCPCS

## 2022-11-21 PROCEDURE — 80053 COMPREHEN METABOLIC PANEL: CPT

## 2022-11-21 PROCEDURE — 92526 ORAL FUNCTION THERAPY: CPT | Performed by: SPEECH-LANGUAGE PATHOLOGIST

## 2022-11-21 RX ORDER — MECOBALAMIN 5000 MCG
10 TABLET,DISINTEGRATING ORAL NIGHTLY
Qty: 30 TABLET | Refills: 3 | Status: SHIPPED | OUTPATIENT
Start: 2022-11-21

## 2022-11-21 RX ORDER — AMOXICILLIN AND CLAVULANATE POTASSIUM 250; 62.5 MG/5ML; MG/5ML
250 POWDER, FOR SUSPENSION ORAL 2 TIMES DAILY
Qty: 100 ML | Refills: 0 | Status: SHIPPED | OUTPATIENT
Start: 2022-11-21 | End: 2022-12-01

## 2022-11-21 RX ORDER — POLYETHYLENE GLYCOL 3350 17 G/17G
17 POWDER, FOR SOLUTION ORAL DAILY
Qty: 527 G | Refills: 1 | Status: SHIPPED | OUTPATIENT
Start: 2022-11-21 | End: 2022-12-21

## 2022-11-21 RX ORDER — LANSOPRAZOLE
30 KIT 2 TIMES DAILY
Qty: 300 ML | Refills: 3 | Status: SHIPPED | OUTPATIENT
Start: 2022-11-21 | End: 2022-11-21 | Stop reason: HOSPADM

## 2022-11-21 RX ORDER — BACITRACIN ZINC 500 [USP'U]/G
OINTMENT TOPICAL
Qty: 30 G | Refills: 1 | Status: SHIPPED | OUTPATIENT
Start: 2022-11-21 | End: 2022-11-30

## 2022-11-21 RX ORDER — GUAIFENESIN 400 MG/1
400 TABLET ORAL 3 TIMES DAILY PRN
Qty: 56 TABLET | Refills: 0 | Status: SHIPPED | OUTPATIENT
Start: 2022-11-21 | End: 2022-11-25

## 2022-11-21 RX ORDER — OMEPRAZOLE 40 MG/1
40 CAPSULE, DELAYED RELEASE ORAL
Qty: 90 CAPSULE | Refills: 1 | Status: SHIPPED | OUTPATIENT
Start: 2022-11-21

## 2022-11-21 RX ORDER — OXYCODONE HCL 5 MG/5 ML
5 SOLUTION, ORAL ORAL EVERY 6 HOURS PRN
Qty: 140 ML | Refills: 0 | Status: SHIPPED | OUTPATIENT
Start: 2022-11-21 | End: 2022-11-28

## 2022-11-21 RX ADMIN — AMPICILLIN SODIUM AND SULBACTAM SODIUM 3000 MG: 2; 1 INJECTION, POWDER, FOR SOLUTION INTRAMUSCULAR; INTRAVENOUS at 06:41

## 2022-11-21 RX ADMIN — METOPROLOL TARTRATE 100 MG: 50 TABLET, FILM COATED ORAL at 11:42

## 2022-11-21 RX ADMIN — BACITRACIN ZINC: 500 OINTMENT TOPICAL at 16:22

## 2022-11-21 RX ADMIN — SODIUM CHLORIDE, PRESERVATIVE FREE 10 ML: 5 INJECTION INTRAVENOUS at 11:43

## 2022-11-21 RX ADMIN — HEPARIN SODIUM 5000 UNITS: 10000 INJECTION INTRAVENOUS; SUBCUTANEOUS at 06:39

## 2022-11-21 RX ADMIN — LANSOPRAZOLE 30 MG: KIT at 16:00

## 2022-11-21 RX ADMIN — AMPICILLIN SODIUM AND SULBACTAM SODIUM 3000 MG: 2; 1 INJECTION, POWDER, FOR SOLUTION INTRAMUSCULAR; INTRAVENOUS at 12:25

## 2022-11-21 RX ADMIN — ACETAMINOPHEN 325 MG: 160 SOLUTION ORAL at 11:47

## 2022-11-21 RX ADMIN — ACETAMINOPHEN 325 MG: 160 SOLUTION ORAL at 00:13

## 2022-11-21 RX ADMIN — LANSOPRAZOLE 30 MG: KIT at 11:44

## 2022-11-21 RX ADMIN — BACITRACIN ZINC: 500 OINTMENT TOPICAL at 07:45

## 2022-11-21 RX ADMIN — AMPICILLIN SODIUM AND SULBACTAM SODIUM 3000 MG: 2; 1 INJECTION, POWDER, FOR SOLUTION INTRAMUSCULAR; INTRAVENOUS at 00:07

## 2022-11-21 RX ADMIN — ACETAMINOPHEN 325 MG: 160 SOLUTION ORAL at 06:39

## 2022-11-21 RX ADMIN — TETRAHYDROZOLINE HCL 4 DROP: 0.05 SOLUTION/ DROPS OPHTHALMIC at 11:42

## 2022-11-21 ASSESSMENT — PAIN SCALES - GENERAL
PAINLEVEL_OUTOF10: 0
PAINLEVEL_OUTOF10: 0

## 2022-11-21 NOTE — PROGRESS NOTES
0800- Flap check performed. Pulse audible with doppler on flap and carotid. Flap warm/tan colored with sutures intact. Neck incision well approximated with staples c/d/I.     1200- Flap check performed. Pulse audible with doppler on flap and carotid. Flap warm/tan colored with sutures intact. Neck incision well approximated with staples c/d/I.     1600- Flap check performed. Pulse audible with doppler on flap and carotid. Flap warm/tan colored with sutures intact.  Neck incision well approximated with staples c/d/I.

## 2022-11-21 NOTE — PROGRESS NOTES
Education provided to pt, pt's wife and daughter. Given opportunity to ask questions and provided successful return demonstration on trach care (cleaning and changing inner cannula), suctioning, changing trach ties, managing speaking valve, managing peg tube including bolus feeds and water flushes and medication administration.

## 2022-11-21 NOTE — PROGRESS NOTES
Upon bedside shift report, found patient walking in room. Pt had unscrewed his IV tubing (antibiotics were infusing) and set the tubing on the bed, and went to the bathroom. Pt confused and thought his IV was his tube feed and stated he did not think he needed it anymore. Pt had BM and had BM all over his gown and both legs when he got back to bed. Pt cleaned, gown and bed pad changed. Educated pt on safety precautions and to use call light if he needs to get out of bed.

## 2022-11-21 NOTE — CARE COORDINATION
Spoke with [facility], they will deliver trach supplies this morning to the home. Looked at trach and did confirm patient has [id number] trach which was the plan on Friday. Spoke with [facility], they do have patient on for start of care this evening once home. For questions I can be reached at [phone].  Mel [place]

## 2022-11-21 NOTE — PROGRESS NOTES
Speech Language Pathology      NAME:  Ludy Osei  :  1955  DATE: 2022  ROOM:  [address] [place], [place] [address]    Pt seen for ongoing PMV training and dysphagia management. ENT team at bedside. Reviewed progress thus far. OK to begin training strategies with Pt for improved swallow safety when cleared for PO. Per Dr Matilde Teixeira, began use of head turn to L and chin tuck simultaneously to allow for improved airway closure and clearance post-op. Pt demonstrated good understanding and implementation. Edu'd Pt to cont to maintain secretion management without suction as much as possible to use pharyngeal/laryngeal muscles and reduce disuse atrophy while NPO. PMV in place upon SLP arrived and remained in place; no secretions noted and Pt reports minimal overall. Voice strong- good volume, strained quality and appropriate phrasing. Reviewed when to remove and overall use. Packet packed in belongings. No further questions.       Tongue mass [K14.8]  Cancer of oral cavity (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

## 2022-11-21 NOTE — PROGRESS NOTES
Discharge instructions reviewed with pt, pt's wife and daughter. Given opportunity to ask questions. PIV removed without complications.

## 2022-11-21 NOTE — PROGRESS NOTES
DEPARTMENT OF OTOLARYNGOLOGY - HEAD & NECK SURGERY   PROGRESS NOTE    PATIENT: Juvencio Born ADMIT DATE:11/14/2022   ROOM/BED: 2867/4635-F TODAY:November 21, 2022     SUBJECTIVE:    No acute events overnight, some confusion  Alert and oriented  Pain is well controlled  Tolerating bolus tube feeds  Using speaking valve well  Trach changed to 6-0 cuffless yesterday  Arm cast taken down, redressed with xeroform/abd pad/ace wrap yesterday        Patient's past medical and surgical history, medications, allergies, labs and imagings were reviewed. OBJECTIVE:                                                                                                                              Physical Exam  Constitutional: Appears awake, alert, cooperative, no apparent distress   Eyes: Lids and lashes normal, pupils equal, round and reactive to light,extra ocular muscles intact, sclera clear, conjunctiva normal   HENT: Doppler sounds with arterial waveform at marked site. Flap appears pink without any increased swelling or signs of venous congestion. Soft, warm to touch with normal capillary refills. Incisions are clean, moist and intact. Neck:  Supple, soft and flat without evidence of hematoma or fluid collection. Incisions clean, moist and intact. Shiley 6-0 cuffed trach midline and intact. Mild  secretions. Extremities Flap donor extremity warm with normal capillary refills, strong distal pulses, intact sensation and movement. No cyanosis or edema. Skin graft donor site with tegaderm/Suresite in place   Cardiovascular: Regular rate   Neurologic:  Cranial nerves II-XII are grossly intact. Marginal Mandibular nerve weak, HB III/IV     Drains: All drains holding suction with serosanguinous output             Output by Drain (mL) 11/19/22 0701 - 11/19/22 1900 11/19/22 1901 - 11/20/22 0700 11/20/22 0701 - 11/20/22 1900 11/20/22 1901 - 11/21/22 0700 11/21/22 0701 - 11/21/22 0710   Closed/Suction Drain Left; Anterior Neck Bulb 3 0             Vitals:    11/20/22 0730 11/20/22 0923 11/20/22 1530 11/20/22 2138   BP: 135/67 135/67 135/60 (!) 154/93   Pulse: (!) 108 (!) 108 89 94   Resp: 18 18 18   Temp: 97.7 °F (36.5 °C)  98.2 °F (36.8 °C) 98.1 °F (36.7 °C)   TempSrc: Axillary  Axillary Oral   SpO2: 98%  99% 98%   Weight:       Height:         24 HR INTAKE/OUTPUT:    Intake/Output Summary (Last 24 hours) at 11/21/2022 0710  Last data filed at 11/21/2022 0007  Gross per 24 hour   Intake 2399.78 ml   Output 1300 ml   Net 1099.78 ml       8HR INTAKE OUTPUT:  No intake/output data recorded.     Recent Results (from the past 24 hour(s))   CBC with Auto Differential    Collection Time: 11/21/22  5:47 AM   Result Value Ref Range    WBC 8.0 4.5 - 11.5 E9/L    RBC 3.72 (L) 3.80 - 5.80 E12/L    Hemoglobin 10.8 (L) 12.5 - 16.5 g/dL    Hematocrit 32.7 (L) 37.0 - 54.0 %    MCV 87.9 80.0 - 99.9 fL    MCH 29.0 26.0 - 35.0 pg    MCHC 33.0 32.0 - 34.5 %    RDW 12.7 11.5 - 15.0 fL    Platelets 142 923 - 724 E9/L    MPV 9.8 7.0 - 12.0 fL    Neutrophils % 75.5 43.0 - 80.0 %    Immature Granulocytes % 3.1 0.0 - 5.0 %    Lymphocytes % 13.2 (L) 20.0 - 42.0 %    Monocytes % 7.4 2.0 - 12.0 %    Eosinophils % 0.3 0.0 - 6.0 %    Basophils % 0.5 0.0 - 2.0 %    Neutrophils Absolute 6.00 1.80 - 7.30 E9/L    Immature Granulocytes # 0.25 E9/L    Lymphocytes Absolute 1.05 (L) 1.50 - 4.00 E9/L    Monocytes Absolute 0.59 0.10 - 0.95 E9/L    Eosinophils Absolute 0.02 (L) 0.05 - 0.50 E9/L    Basophils Absolute 0.04 0.00 - 0.20 E9/L   Comprehensive Metabolic Panel    Collection Time: 11/21/22  5:47 AM   Result Value Ref Range    Sodium 140 132 - 146 mmol/L    Potassium 4.3 3.5 - 5.0 mmol/L    Chloride 104 98 - 107 mmol/L    CO2 25 22 - 29 mmol/L    Anion Gap 11 7 - 16 mmol/L    Glucose 117 (H) 74 - 99 mg/dL    BUN 18 6 - 23 mg/dL    Creatinine 0.8 0.7 - 1.2 mg/dL    Est, Glom Filt Rate >60 >=60 mL/min/1.73    Calcium 9.2 8.6 - 10.2 mg/dL    Total Protein 6.4 6.4 - 8.3 g/dL Albumin 3.5 3.5 - 5.2 g/dL    Total Bilirubin 0.4 0.0 - 1.2 mg/dL    Alkaline Phosphatase 100 40 - 129 U/L    ALT 71 (H) 0 - 40 U/L    AST 49 (H) 0 - 39 U/L         Problem List Items Addressed This Visit          Digestive    Cancer of oral cavity (HCC)    Relevant Medications    acetaminophen (TYLENOL) 160 MG/5ML solution 325 mg    oxyCODONE (ROXICODONE) 5 MG/5ML solution 5 mg    Other Relevant Orders    Surgical Pathology    Surgical Pathology    Surgical Pathology    Surgical Pathology    Surgical Pathology    Surgical Pathology       Other    Tongue mass    Relevant Orders    Surgical Pathology    Surgical Pathology    Surgical Pathology    Surgical Pathology    Surgical Pathology    Surgical Pathology     Other Visit Diagnoses       Pre-operative laboratory examination    -  Primary    Relevant Orders    CBC (Completed)    Type and Screen (Completed)    EKG 12 Lead    Culture, MRSA, Screening (Completed)               ASSESSMENT:  69 y.o.male with history of oral cavity SCC s/p left hemiglossectomy, left neck dissection and left ulnar forearm free flap reconstruction with Finesse Teixeira and Luis Carlos Nina on 11/14/22. PLAN:   Flap: Continue Q4H flap checks with doppler per nursing. Please call on-call ENT resident for any change in color in flap color, doppler sounds, or increased swelling/firmness at the related flap or donor site. Gentle intraoral suctioning. CV: Discontinue arterial line POD1 - done. Maintain systolic blood pressure between 100-160. Keep MAP greater than 70. Home Toprol and PRN hydralazine for systolic >757  Resp: Wean O2 as able. Humidified air at all times. Trach suctions Q2H PRN to prevent mucus plug and per protocol. Analgesia: Scheduled Tylenol (decreased) PRN Roxicodone through the NGT/PEG as tolerated  GI: PPI (Peptamen if fish allergy), bowel regimen, dietary consult, Tube feeds at goal. Now with bolus feeds  Advancing tube feed protocol: check residual Q4H.    If >300cc, stop TF for 1-4h and recheck residual  If 200cc-300, half the current rate  If 100cc-200, maintain current rate and recheck residual Q4H  If <100cc, advance TF as above  : Maintain nettles, discontinue POD2 (done)  ID: Continue Unasyn Q6H while drains are in place; continue Decadron 8 mg Q8H 3 days postoperatively-done . Drains: Monitor output, stripping with flap checks  Embolic PPX: SCDs while in bed & SubQ Heparin 5000 TID for DVT prophylaxis and assist flap perfusion    PT/OT: encourage out of bed to chair, ambulate as tolerated  Medical management per medicine/SICU, appreciate care  Speech therapy - OK for speaking valve and speaking,  social work, [facility], consults ordered  Dispo: [address] [place] [address]    Patient seen and examined. Plans discussed with attending physician.       Laura Partida DO  Resident Physician  Otolaryngology  Melita Alvarez 1945 11/21/2022  7:10 AM

## 2022-11-22 ENCOUNTER — TELEPHONE (OUTPATIENT)
Dept: ENT CLINIC | Age: 67
End: 2022-11-22

## 2022-11-22 NOTE — TELEPHONE ENCOUNTER
Home health called states that they are discharging patient from physical therapy due to the patient is doing very well.     Rosette from [facility] [phone]

## 2022-11-23 ENCOUNTER — OFFICE VISIT (OUTPATIENT)
Dept: ENT CLINIC | Age: 67
End: 2022-11-23

## 2022-11-23 VITALS — BODY MASS INDEX: 32.35 KG/M2 | WEIGHT: 226 LBS | HEIGHT: 70 IN

## 2022-11-23 DIAGNOSIS — K14.8 TONGUE MASS: ICD-10-CM

## 2022-11-23 DIAGNOSIS — C06.9 ORAL CANCER (HCC): Primary | ICD-10-CM

## 2022-11-23 PROCEDURE — 99024 POSTOP FOLLOW-UP VISIT: CPT | Performed by: OTOLARYNGOLOGY

## 2022-11-23 NOTE — TELEPHONE ENCOUNTER
Patients wife called this morning states that they can not find the cap for his trach. They need a new script for Amoxicillin due to they did not put meds in the refrigerator.     Also they said that home health needs an order for wound care

## 2022-11-23 NOTE — PROGRESS NOTES
Patient seen and examined.   LLE dressing changes and opticel placed  LUE dressing changed, bacitracin, telfa, guaze, kerlex and ace bandaged replace  Neck staples removed, incision c/d/I  Trach stoma c/d/I, speaking well with PMV in place   Oral flap c/d/I warm to touch, pink in color, capillary refill, visible sutures intact     Plan:  -apply aquaphor to neck incisions and arm incision, keep incisions moist  -keep STSG site on LUE covered with non-adherent dressing such as telfa gauze and kerlex wrap, change every other day or as needed  -keep opticel on the LLE wound area, change as it fills with drainage, may leave open to air after that   -will work on capping the trach  -follow up on Tuesday with Dr Ariella Gandhi  -ok to use gaviscon instead as PPI tabs were blocking peg tube

## 2022-11-28 NOTE — PROGRESS NOTES
Dear Dr Keya Olsen MD No ref. provider found     We had the pleasure of seeing Clarisse Gee, 1955 here    on 11/29/2022  Please see below for review of care and plans. Chief complaint- No diagnosis found. 11/14/22 - MOUTH LESION BIOPSY EXCISION, ELISHA GLOSSECTOMY, NECK DISSECTION RADICAL, FREE FLAP GRAFT NECK DISSECTION-ROBOTIC, SKIN GRAFT LEFT THIGH, TRACH  TUBE PLACMENT    History of Present Illness- 68 y/o male presents to our clinic for evaluation of tongue lesion. Left lateral tongue. Noticed approximately 2-3 months ago. Non painful, non tender. Tolerating PO intake. Denies cervical lymphadenopathy. Denies weight loss. Denies appetite change. Denies difficulty swallowing or drinking. Has had previous biopsy of tongue lesion which came back benign. Non smoker. Recent biopsy- pos scca    10/20/22: Pt here for 1 wk p/o Panendo. States pain was improving fri and sat after Sx, then sun night started w/ a burning pain in throat and cheek. Has a white patch on cheek that is causing concern of infection. Also states he has increased foul odor from mouth. Some difficulty swallowing when pain flares up. No change in voice since recovering from Panendo. Drinks 3-4 glasses of water, green tea periodically, and no alcohol. Weight is stable. 11/29/22: Pt here for post op neck dissection and mouth lesion Bx done 11/14/22. No pain. NPO, PEG tube feedings. Drinks 3-4 bottles of water, no caffeine, and no alcohol. Weight is stable. Trach in place. Still with significant secretions. Not able to be fully capped yet. Review of Systems- No drainage, discharge, or headache. Complete 10 system ROS completed and negative except as noted above. Physical Examination-   Vital Signs-There were no vitals taken for this visit. Ears- Tympanic membranes clear bilaterally. No middle ear effusion. No pre or post auricular tenderness. Nose- Nasal mucosa clear and dry.   No significant septal deviation or inferior turbinate hypertrophy. Oral Cavity/Oropharynx- Floor of mouth and tongue are soft and nontender. No posterior pharyngeal erythema. + gag reflex left lateral tongue/floor of mouth ~2cm plaque like lesion with submucosal firmness. + healing biopsy sites intraorally. Neck- Soft and nontender. No masses, lesions, lymphadenopathy, or thyroid nodules appreciated. Cranial Nerve- Cranial nerves II to XII intact. Extraocular muscles intact. No gross motor visual deficits. No spontaneous nystagmus. Face- No facial skin tenderness to palpation. Heart- No cyanosis, regular  Lungs- No stridor, no intercostal accessory muscle use  General- The patient is in no acute distress. A&O x3    Nasopharyngoscopy prevoius  Procedure note- after pt verbally consented, was sprayed nasally with 1:1 neosynephrine and xylocaine. Scope was passed and found nasal cavity with no lesion. nasopharynx clear, tonsil wnl without asymmetry, tongue mobile and no masses, vocal cords mobile bilaterally with full ab and ad duction. Hypopharynx clear, open and no masses. Medical Decision Making and Treatment Plan. Pt seen and examined, scope exam with grossly normal findings. FNA of tongue done in office today. Will schedule for panendoscopy. Follow up in 1 week for path results review. R/B/A of surgery discussed with pt. Pt understood, consent signed, and would like to proceed to surgery. No personal or family history of bleeding or adverse reaction to anesthesia. Suspect will  need upfront surgical therapy with recon and then adjuvant therapy based upon pathology  Present at tumor board- recommend upfront surgery with neck and path driven adjuvant therapy as indicated. Pan consult- ordered  Panendo- results-  pos scca.  Posterior base of tongue mapping near 19 was pos  Recommend partial glossectomy with neck dissection and forearm free flap for txment as mass is 4 cm and with margins will have sig tissue loss.  Long dw pt, wife and daughter on process of surgery. Recommend preop peg and trach day of- they udnerstood  Talked about rehab and fact that all children live in [place], offered ability to seek second opinion there if they wanted, also offered to have rehab there if he needed it. 13. R/B/A of surgery discussed with pt. Pt understood, consent signed, and would like to proceed to surgery. No personal or family history of bleeding or adverse reaction to anesthesia. 14. Wound care-apply aquaphor to neck incisions and arm incision, keep incisions moist  -keep STSG site on LUE covered with non-adherent dressing such as telfa gauze and kerlex wrap, change every other day or as needed  -keep opticel on the LLE wound area, change as it fills with drainage, may leave open to air after that   -will work on capping the trach  15. Work on capping. Will also need flap revision. I spent 30 minutes with the patients care and >50% of this time on counseling or coordinating care in this advanced oral cancer patient. Thank you for the opportunity to take part in the care of this very pleasant patient, [place]  Sincerely,          Kasie Pedraza.  Jesse Lee M.D., Ph.D., [address] [facility]   Department of Otolaryngology-Head and Neck Surgery  Chief of Head & Neck Surgical Oncology  Director Head & Neck Oncology Service Line

## 2022-11-29 ENCOUNTER — OFFICE VISIT (OUTPATIENT)
Dept: ENT CLINIC | Age: 67
End: 2022-11-29

## 2022-11-29 VITALS — WEIGHT: 208 LBS | HEIGHT: 70 IN | BODY MASS INDEX: 29.78 KG/M2

## 2022-11-29 DIAGNOSIS — Z93.0 TRACHEOSTOMY DEPENDENCE (HCC): Primary | ICD-10-CM

## 2022-11-29 DIAGNOSIS — R13.12 OROPHARYNGEAL DYSPHAGIA: ICD-10-CM

## 2022-11-29 DIAGNOSIS — R49.0 DYSPHONIA: ICD-10-CM

## 2022-11-29 DIAGNOSIS — C06.9 CANCER OF ORAL CAVITY (HCC): ICD-10-CM

## 2022-11-30 ENCOUNTER — OFFICE VISIT (OUTPATIENT)
Dept: ONCOLOGY | Age: 67
End: 2022-11-30
Payer: MEDICARE

## 2022-11-30 ENCOUNTER — TELEPHONE (OUTPATIENT)
Dept: INFUSION THERAPY | Age: 67
End: 2022-11-30

## 2022-11-30 VITALS
WEIGHT: 209.8 LBS | OXYGEN SATURATION: 98 % | BODY MASS INDEX: 30.03 KG/M2 | HEART RATE: 103 BPM | DIASTOLIC BLOOD PRESSURE: 84 MMHG | HEIGHT: 70 IN | SYSTOLIC BLOOD PRESSURE: 136 MMHG | TEMPERATURE: 96.5 F

## 2022-11-30 DIAGNOSIS — C02.9 PRIMARY TONGUE SQUAMOUS CELL CARCINOMA (HCC): Primary | ICD-10-CM

## 2022-11-30 PROCEDURE — 99212 OFFICE O/P EST SF 10 MIN: CPT

## 2022-11-30 RX ORDER — HYDROXYZINE HCL 10 MG/5 ML
SOLUTION, ORAL ORAL
COMMUNITY
Start: 2022-11-29

## 2022-11-30 ASSESSMENT — ENCOUNTER SYMPTOMS
COUGH: 1
GASTROINTESTINAL NEGATIVE: 1
STRIDOR: 0
SHORTNESS OF BREATH: 0
VOICE CHANGE: 1
TROUBLE SWALLOWING: 1

## 2022-11-30 NOTE — PROGRESS NOTES
[address] [address]  [facility] [phone]  Clinic Progress Note      Reason for Visit:   Left tongue mass    Referring Provider: Elba Cavazos DO    PCP:  Katelyn Santizo MD    Chief Complaint:   Chief Complaint   Patient presents with    New Patient       Subjective:  Patient returns after having left hemiglossectomy, neck dissection, and flap creation with ENT. Overall, patient is recovering well. He was accompanied by his wife and son. Patient had trach, PEG tube. He is able to speak relatively well, with some muffling in his voice. Is a fevers. He does have some mild cough. Denies shortness of breath, nausea. He had an episode of vomiting yesterday. Otherwise he appears well. Pt is NPO and has been using tube feeds    HPI from Initial Outpatient Consultation - 10/14/2022:  The patient is a 69 y.o. male who comes in for consultation. Left tongue mass. At this time there is no confirmed diagnosis. 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 Was not until the last 3 months, when he felt the left aspect of his tongue started to feel harder/rougher. He sought evaluation with his dentist, then dermatologist and oral surgeon. Subsequently he was referred to ENT, seen by Dr. Tessa Magallanes as malignancy became a concern. And then on 10/4/2022, FNA was done which was unfortunately nondiagnostic. CT soft tissue neck on 10/10/2022 noted a 3.8 x 1.8 x 2.6 cm tumor involving the lateral aspect of his tongue, and subsequent PET scan showed hypermetabolic activity in this area.     He established with radiation oncology with Dr. Porfirio Jang for recommendations recently. And yesterday, he followed up with Dr. Prosper Bonner for veronica endoscopy and repeat biopsy. Panendoscopy did not reveal any other lesions apart from that of the left tongue. We are currently awaiting for pathology at this time. Today, he establishes with me and was accompanied by his wife. He denies ever having significant pain. He denies of lymphadenopathy, unintended weight loss, difficulty swallowing. He denies history of smoking, drinking, or illicit drug use. Overall he appears comfortable and attentive. Review of Systems; Review of Systems   Constitutional:  Negative for chills and fever. HENT:  Positive for trouble swallowing and voice change. Respiratory:  Positive for cough. Negative for shortness of breath and stridor. Cardiovascular:  Negative for chest pain and leg swelling. Gastrointestinal: Negative. Musculoskeletal: Negative. Skin: Negative. Neurological: Negative. Hematological:  Negative for adenopathy. Does not bruise/bleed easily. Psychiatric/Behavioral: Negative.          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 Right 2021    LARYNGOSCOPY N/A 10/13/2022    DIRECT LARYNGOSCOPY, BRONCHOSCOPY, ESOPHAGOSCOPY performed by Erika Saucedo MD at [facility]    MOUTH SURGERY N/A 11/14/2022    MOUTH LESION BIOPSY EXCISION, ELISHA GLOSSECTOMY, NECK DISSECTION RADICAL, FREE FLAP GRAFT NECK DISSECTION-ROBOTIC, SKIN GRAFT LEFT THIGH, TRACH  TUBE PLACMENT performed by Carly Hinkle MD at [facility] Left 10/04/2022       Family History:  Family History   Problem Relation Age of Onset    High Blood Pressure Mother     Breast Cancer Mother     Heart Disease Mother     Heart Disease Father     Other Father         heart valve replacement    High Blood Pressure Sister     No Known Problems Maternal Aunt     No Known Problems Maternal Uncle     No Known Problems Paternal Aunt     No Known Problems Paternal Uncle     Stroke Maternal Grandmother     Cancer Maternal Grandfather         patient doesn't know site. Cancer Paternal Grandmother         patient doesn't know site. Stroke Paternal Grandfather     No Known Problems Maternal Cousin     No Known Problems Paternal Cousin     No Known Problems Daughter     No Known Problems Daughter     No Known Problems Son     No Known Problems Grandchild        Medications:  Reviewed and reconciled. Current Outpatient Medications   Medication Sig Dispense Refill    hydrOXYzine (ATARAX) 10 MG/5ML syrup       melatonin 5 MG TBDP disintegrating tablet Take 2 tablets by mouth nightly 30 tablet 3    sennosides (SENOKOT) 8.8 MG/5ML syrup 10 mLs by Per G Tube route nightly 300 mL 0    polyethylene glycol (GLYCOLAX) 17 g packet 17 g by Per G Tube route daily 527 g 1    bacitracin zinc 500 UNIT/GM ointment Apply topically 2 times daily.  30 g 1    amoxicillin-clavulanate (AUGMENTIN) 250-62.5 MG/5ML suspension 5 mLs by Per G Tube route 2 times daily for 10 days 100 mL 0    omeprazole (PRILOSEC) 40 MG delayed release capsule Take 1 capsule by mouth every morning (before breakfast) 90 capsule 1    Cyanocobalamin (VITAMIN B 12 PO) Take 1 tablet by mouth daily      metoprolol (LOPRESSOR) 100 MG tablet Take 100 mg by mouth 2 times daily      Polyvinyl Alcohol-Povidone (REFRESH OP) Apply 4 drops to eye in the morning and at bedtime Indications: bilateral eyes      tetrahydrozoline 0.05 % ophthalmic solution Place 4 drops into both eyes 2 times daily      acetaminophen (TYLENOL) 500 MG tablet Take 1,000 mg by mouth daily as needed for Pain      loratadine (CLARITIN) 10 MG tablet Take 10 mg by mouth daily PRN      Multiple Vitamins-Minerals (CENTRUM SILVER PO) Take 1 tablet by mouth daily      Ascorbic Acid (VITAMIN C) 1000 MG tablet Take 1,000 mg by mouth daily      zinc 50 MG TABS tablet Take 100 mg by mouth 2 times daily      Cholecalciferol (VITAMIN D3) 50 MCG (2000 UT) CAPS Take 1 capsule by mouth daily      vitamin B-6 (PYRIDOXINE) 100 MG tablet Take 100 mg by mouth daily      atorvastatin (LIPITOR) 10 MG tablet Take 10 mg by mouth at bedtime       No current facility-administered medications for this visit.          Social History:  Social History     Socioeconomic History    Marital status:      Spouse name: Not on file    Number of children: Not on file    Years of education: Not on file    Highest education level: Not on file   Occupational History    Not on file   Tobacco Use    Smoking status: Never     Passive exposure: Past    Smokeless tobacco: Never   Vaping Use    Vaping Use: Never used   Substance and Sexual Activity    Alcohol use: No    Drug use: No    Sexual activity: Not Currently   Other Topics Concern    Not on file   Social History Narrative    Not on file     Social Determinants of Health     Financial Resource Strain: Not on file   Food Insecurity: Not on file   Transportation Needs: Not on file   Physical Activity: Not on file   Stress: Not on file   Social Connections: Not on file   Intimate Partner Violence: Not on file   Housing Stability: Not on file       Allergies:  No Known Allergies    Physical Exam:  /84   Pulse (!) 103   Temp (!) 96.5 °F (35.8 °C)   Ht 5' 10\" (1.778 m)   Wt 209 lb 12.8 oz (95.2 kg)   SpO2 98%   BMI 30.10 kg/m²   Physical Exam  Constitutional:       General: He is not in acute distress. Appearance: He is not toxic-appearing. HENT:      Head: Normocephalic. Nose: Nose normal.      Mouth/Throat:      Mouth: Mucous membranes are moist.      Comments: Status post surgical changes of left tongue, status post left hemiglossectomy  Eyes:      General: No scleral icterus. Neck:      Comments: Tracheostomy intact. Left-sided surgical incisions healing well. Cardiovascular:      Rate and Rhythm: Normal rate and regular rhythm. Heart sounds: No murmur heard. Pulmonary:      Effort: No respiratory distress. Breath sounds: No stridor. No wheezing. Abdominal:      General: Abdomen is flat. There is no distension. Palpations: Abdomen is soft. There is no mass. Tenderness: There is no abdominal tenderness. Comments: PEG tube intact, area clean   Musculoskeletal:      Cervical back: Normal range of motion. No rigidity or tenderness. Right lower leg: No edema. Left lower leg: No edema. Lymphadenopathy:      Cervical: No cervical adenopathy. Skin:     Findings: No lesion or rash. Neurological:      General: No focal deficit present. Mental Status: He is alert and oriented to person, place, and time. Psychiatric:         Mood and Affect: Mood normal.         Behavior: Behavior normal.         Thought Content: Thought content normal.       ECOG PS 0    Pathology:              CT SOFT TISSUE NECK W CONTRAST    Result Date: 10/11/2022  EXAMINATION: CT OF THE NECK SOFT TISSUE WITH CONTRAST  10/10/2022 TECHNIQUE: CT of the neck was performed with the administration of intravenous contrast. Multiplanar reformatted images are provided for review. Automated exposure control, iterative reconstruction, and/or weight based adjustment of the mA/kV was utilized to reduce the radiation dose to as low as reasonably achievable. COMPARISON: None.  HISTORY: ORDERING SYSTEM PROVIDED HISTORY: Tongue mass TECHNOLOGIST PROVIDED HISTORY: STAT Creatinine as needed:->No Reason for exam:->left lateral tongue mass FINDINGS: PHARYNX/LARYNX:  The palatine tonsils are normal in appearance. There is an asymmetrically enhancing focus in the left aspect of the tongue that measures approximately 3.8 x 1.8 x 2.6 cm. The valleculae, epiglottis, aryepiglottic folds and pyriform sinuses appear unremarkable. The true and false vocal cords are normal in appearance. No mass or abscess is seen. SALIVARY GLANDS/THYROID:  The parotid and submandibular glands appear unremarkable. The thyroid gland appears unremarkable. LYMPH NODES:  No cervical or supraclavicular lymphadenopathy is seen. SOFT TISSUES:  No appreciable soft tissue swelling or mass is seen. BRAIN/ORBITS/SINUSES:  The visualized portion of the intracranial contents appear unremarkable. The visualized portion of the orbits, paranasal sinuses and mastoid air cells demonstrate no acute abnormality. LUNG APICES/SUPERIOR MEDIASTINUM:  No focal consolidation is seen within the visualized lung apices. No superior mediastinal lymphadenopathy or mass. The visualized portion of the trachea appears unremarkable. BONES:  No aggressive appearing lytic or blastic bony lesion. Asymmetrically enhancing mass in the left aspect of the tongue concerning for neoplastic process. Tissue sampling is recommended. PET CT SKULL BASE TO MID THIGH    Result Date: 10/11/2022  EXAMINATION: WHOLE BODY PET/CT 10/10/2022 TECHNIQUE: Following IV injection of 13.1 mCi of F-18 FDG, PET  tumor imaging was acquired from the skull vertex to the mid thighs. Computed tomography was used for purposes of attenuation correction and anatomic localization. Fusion imaging was utilized for interpretation. Uptake time 60 min. Glucose level 92 mg/dl.  COMPARISON: CT scan of the neck 10/10/2022 HISTORY: ORDERING SYSTEM PROVIDED HISTORY: Tongue cancer ([facility] [address] TECHNOLOGIST PROVIDED HISTORY: Reason for exam:->left lateral tongue mass What reading provider will be dictating this exam?->CRC FINDINGS: HEAD/NECK: Asymmetric activity along the left lateral aspect of the tongue where there is an enhancing mass described on the CT concurrent contrast-enhanced CT scan, SUV max of 9.9. No metabolically active cervical lymphadenopathy. CHEST: No metabolically active pulmonary nodules. No metabolically active axillary, hilar, or mediastinal lymphadenopathy. ABDOMEN/PELVIS: No metabolically active intraperitoneal mass. No metabolically active abdominal or pelvic lymphadenopathy. Physiologic activity in the gastrointestinal and genitourinary systems. BONES/SOFT TISSUE: No abnormal FDG activity localizes to the bones. No aggressive osseous lesion. INCIDENTAL CT FINDINGS: Atherosclerotic calcifications within the coronary arteries and the aorta and its branches. Cholelithiasis. Excreted contrast material is present. 1.  Metabolic activity associated with the mass along the left lateral aspect of the tongue consistent with history of cancer. 2.  No metabolically active metastatic disease. ASSESSMENT:    Left tongue squamous cell carcinoma, moderately differentiated, pT3N0: The patient established with me on 10/14/2022. Upon first visit, no diagnosis has been obtained, as he had a negative FNA on 10/4/2022. Furthermore he has imaging findings concerning for malignancy on 10/10/2022 with CT soft tissue neck and PET scan. Of note he did have previous lesion of squamous hyperplasia of the left tongue back in 2020 which was resected, as noted below. Further details outlined on history above  Clinically, appears to be at least a T3 case if he indeed has squamous cell carcinoma. Discussed case during multidisciplinary tumor board on 10/18/2022. Pathology noted squamous cell carcinoma.     On 11/14/2022, patient is status post left hemiglossectomy/lesion excision, left radical neck dissection with free flap graft (skin graft left thigh, left arm), and tracheostomy tube placement. Results of surgical pathology noted positive moderately differentiated invasive squamous cell carcinoma of lesser cornu/hyoid margin, left tongue, with all margins negative. Index tumor size was 3.8 x 2.5 x 2 cm, grade 2 (moderately differentiated, depth of invasion (DOI) at least 24 mm, LVI/PNI both were not identified. Fourteen lymph nodes collected, all which are negative. Case presented again during multidisciplinary tumor board 11/29/2022, noting role of postoperative radiation, and omit chemotherapy. Previous history of left tongue squamous hyperplasia: Noted on biopsy in 2020. There is also associated hyperkeratosis/parakeratosis with chronic inflammation. See above of snippet of report. PLAN:  Seen by ENT yesterday  Pt doing well today  I reviewed tumor board report, noting recommendations for post operative radiation  Chemotherapy is not being recommended at this   Pt is to follow up with rad onc  We briefly discussed surveillance protocol   Upon next follow up will discuss scans    DISPO:  RTC in 2 months    Approximately spent 33 minutes on chart review as well as time spent on patient encounter, discussing the laboratory, imaging, and clinical findings. I have discussed clinical implications and recommendations on the patient's primary issues. More than 50% of time was spent counseling patient. The patient verbalized understanding.       Thank you for allowing us to participate in the care of Fauzia Kelly S Hipolito Blackwood  11/30/22 9:30 AM    [address] [address]) Office  P: [phone]  F: [phone] [facility]) Office  P: [phone]  F: [phone]

## 2022-11-30 NOTE — TELEPHONE ENCOUNTER
Nava Meyer  11/30/2022  Wt Readings from Last 10 Encounters:   11/30/22 209 lb 12.8 oz (95.2 kg)   11/29/22 208 lb (94.3 kg)   11/23/22 226 lb (102.5 kg)   11/18/22 226 lb 12.8 oz (102.9 kg)   11/08/22 220 lb (99.8 kg)   11/07/22 225 lb (102.1 kg)   10/21/22 226 lb 9.6 oz (102.8 kg)   10/20/22 228 lb (103.4 kg)   10/14/22 228 lb 11.2 oz (103.7 kg)   10/13/22 227 lb (103 kg)     Ht Readings from Last 1 Encounters:   11/30/22 5' 10\" (1.778 m)     BMI=30.1    Assessment: Visited with Enrique Dowling and his wife while in for OV with Dr. Joanna Gastelum for tongue mass. He had a PEG placed on 11/8/22 and on 11/14/22 underwent mouth lesion excision, hemiglossectomy, neck disection and trach tube placement. He remains NPO at this time and is undergoing SLP therapy. He has been utilizing PEG tube for nutrition support. Tube feeding is set up through [facility] and is receiving TwoCal HN 4 cartons per day, Prosource 30ml BID. Enrique Dowling and wife both report no problems with tube feeding administration, TF tolerance, EN formula and supply delivery, or PEG tube site. He did have emesis once but it was when he entered the cold air and started coughing. Wife reports Two Jayjay HN provided 1 carton per feeding and utilizes a bottle of water with each feeding. Current tube feeding administration provides 2100kcals, 100gm protein, 664ml free water from formula+1920ml water from flushes. Aeb weight loss and estimated calculated needs, current tube feeding not meeting his needs. Recommend increase TwoCal HN to 5 cartons per day with 360ml water flush 5 times per day. Tube feeding increase will provide 2575kcals, 120gm protein, 830ml free water from formula+1800ml water from flush.      Weight change: 6.76% significant weight loss x 1 month, 8.38% significant weight loss x 2 months  Appetite: NPO, EN dependent Two Jayjay HN 4 cartons per day, Prosource 30ml 2x per day  Nutritional Side Effects: dysphagia  Calculated Needs: 28-30kcal/kg/LYA=6754-3550fzuqr, 1.3-1.5gm/kg/EYS=273-951nt protein, 1ml/kcal=2700-2900ml fluids  Malnutrition Status: Moderate  Nutrition Diagnosis: Moderate malnutrition r/t SCC of base of tongue AEB significant weight loss, mild subcutaneous fat loss noted to orbital and buccal fat pads. Recommendations: Continue NPO. Increase enteral nutrition via PEG tube to Two Jayjay HN 1 carton 5 times per day. 30ml Prosource 2x per day. Water flush 360ml 5 times per day.      Joe Long RD

## 2022-12-06 ENCOUNTER — HOSPITAL ENCOUNTER (OUTPATIENT)
Dept: RADIATION ONCOLOGY | Age: 67
Discharge: HOME | End: 2022-12-06
Payer: MEDICARE

## 2022-12-06 VITALS
RESPIRATION RATE: 18 BRPM | WEIGHT: 209.6 LBS | OXYGEN SATURATION: 97 % | BODY MASS INDEX: 30.07 KG/M2 | DIASTOLIC BLOOD PRESSURE: 90 MMHG | HEART RATE: 92 BPM | TEMPERATURE: 97 F | SYSTOLIC BLOOD PRESSURE: 144 MMHG

## 2022-12-06 PROCEDURE — 99213 OFFICE O/P EST LOW 20 MIN: CPT

## 2022-12-06 PROCEDURE — 99214 OFFICE O/P EST MOD 30 MIN: CPT | Performed by: SPECIALIST

## 2022-12-06 NOTE — PROGRESS NOTES
12/8/22: Pt here for trach removal and tongue reduction. No pain. Currently NPO. No change in voice. Drinks 4-5 bottles of water, no caffeine, and no alcohol. Weight is stable.

## 2022-12-06 NOTE — PROGRESS NOTES
DEPARTMENT OF RADIATION ONCOLOGY   Follow up visit        2022    NAME:  Igor Jimenez    :  1955 69 y.o. 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 RECENT HISTORY: Igor Jimenez returns for a post surgery treatment follow up visit. Final pathology demonstrated a invasive carcinoma of the left lateral tongue with the closest margin of 8 mm. All margins were negative. There were 14 nodes that were procured none of which contained metastatic tumor deposits. However the primary tumor measured 3.8 x 2.5 x 2 cm grade 2 histology and at least 24 mm depth of invasion. Likely he comes to us postoperatively for consideration of adjuvant treatment options. Past medical, surgical, social and family histories reviewed and updated as indicated. ALLERGIES:  Patient has no known allergies.        MEDICATIONS:   Current Outpatient Medications:     hydrOXYzine (ATARAX) 10 MG/5ML syrup, , Disp: , Rfl:     melatonin 5 MG TBDP disintegrating tablet, Take 2 tablets by mouth nightly, Disp: 30 tablet, Rfl: 3    sennosides (SENOKOT) 8.8 MG/5ML syrup, 10 mLs by Per G Tube route nightly, Disp: 300 mL, Rfl: 0    polyethylene glycol (GLYCOLAX) 17 g packet, 17 g by Per G Tube route daily, Disp: 527 g, Rfl: 1    omeprazole (PRILOSEC) 40 MG delayed release capsule, Take 1 capsule by mouth every morning (before breakfast), Disp: 90 capsule, Rfl: 1    Cyanocobalamin (VITAMIN B 12 PO), Take 1 tablet by mouth daily, Disp: , Rfl:     metoprolol (LOPRESSOR) 100 MG tablet, Take 50 mg by mouth 2 times daily, Disp: , Rfl:     Polyvinyl Alcohol-Povidone (REFRESH OP), Apply 4 drops to eye in the morning and at bedtime Indications: bilateral eyes, Disp: , Rfl:     tetrahydrozoline 0.05 % ophthalmic solution, Place 4 drops into both eyes 2 times daily, Disp: , Rfl:     acetaminophen (TYLENOL) 500 MG tablet, Take 1,000 mg by mouth daily as needed for Pain, Disp: , Rfl:     loratadine (CLARITIN) 10 MG tablet, Take 10 mg by mouth daily PRN, Disp: , Rfl:     Multiple Vitamins-Minerals (CENTRUM SILVER PO), Take 1 tablet by mouth daily, Disp: , Rfl:     Ascorbic Acid (VITAMIN C) 1000 MG tablet, Take 1,000 mg by mouth daily, Disp: , Rfl:     zinc 50 MG TABS tablet, Take 100 mg by mouth 2 times daily, Disp: , Rfl:     Cholecalciferol (VITAMIN D3) 50 MCG (2000 UT) CAPS, Take 1 capsule by mouth daily, Disp: , Rfl:     vitamin B-6 (PYRIDOXINE) 100 MG tablet, Take 100 mg by mouth daily, Disp: , Rfl:     atorvastatin (LIPITOR) 10 MG tablet, Take 10 mg by mouth at bedtime, Disp: , Rfl:     REVIEW OF SYSTEMS: Obtained from the patient, chart review and nursing assessment.   General ROS: positive for  - fatigue  Psychological ROS: positive for - anxiety  Ophthalmic ROS: negative  ENT ROS: positive for - soreness in mouth post surgery  negative for - hearing change, nasal discharge, nasal polyps, tinnitus, vertigo, or visual changes  Allergy and Immunology ROS: negative  Hematological and Lymphatic ROS: negative  Endocrine ROS: negative  Breast ROS: negative  Respiratory ROS: no cough, shortness of breath, or wheezing  Cardiovascular ROS: no chest pain or dyspnea on exertion  Gastrointestinal ROS: no abdominal pain, change in bowel habits, or black or bloody stools  Genito-Urinary ROS: no dysuria, trouble voiding, or hematuria  Musculoskeletal ROS: positive for - left arm graft site discomfort  negative for - joint stiffness, joint swelling, or muscle pain  Neurological ROS: no TIA or stroke symptoms  Dermatological ROS: negative    PHYSICAL EXAMINATION:    Vitals:    12/06/22 1004   BP: (!) 144/90   Pulse: 92   Resp: 18   Temp: 97 °F (36.1 °C)   TempSrc: Temporal   SpO2: 97%   Weight: 209 lb 9.6 oz (95.1 kg)       Wt Readings from Last 3 Encounters:   12/06/22 209 lb 9.6 oz (95.1 kg)   11/30/22 209 lb 12.8 oz (95.2 kg)   11/29/22 208 lb (94.3 kg)       KARNOSKY PERFORMACE STATUS:      Constitutional: A well developed, well nourished 69 y.o. male who is alert, oriented, cooperative and in no apparent distress. EENT: EOMI SAM. MMM. No icterus. No conjunctival injections. External canals are patent and no discharge was appreciated. Visual inspection of the oral cavity demonstrates a reconstruction which appears to be healing quite nicely. There is a small tag towards the anterior tip of his tongue. Soft palate elevates bilaterally. Neck: Supple without thyromegaly. The curvilinear incision over his left hemineck appears to be healing quite well. There is no ipsilateral or contralateral cervical adenopathy to report. Respiratory: Chest was symmetrical without dullness to percussion. Breath sounds bilaterally were clear to auscultation. No wheezes, rhonchi or rales. Breasts: Deferred    Cardiovascular: Regular without murmur. Abdomen: Rounded and soft without organomegaly. No rebound, guarding or  rigidity. Lymphatic: No lymphadenopathy. Musculoskeletal: Ambulates without assistance. No gross muscle weakness. Muscle size, tone and strength are normal. No involuntary movements. Extremities:  No lower extremity edema, ulcerations, tenderness, varicosities or erythema. Coordination appears adequate. Sensory function appears intact. The harvest site over his left forearm is noted and is currently bandaged. Skin:  Warm and dry. Examination of color, turgor and pigmentation was relatively normal. No bruises or skin rashes. Neurological/Psychiatric: General behavior, level of consciousness, thought content is normal. The patient's emotional status is normal.  Cranial nerves II-XII are grossly intact.       TUMOR MARKERS: No results found for: PSA, CEA, , CM0839,     ASSESSMENT/PLAN:    Jairo Sanders is now 3 weeks post surgery for a pT3 pN0 cM0 invasive, moderately differentiated squamous cell carcinoma of the left lateral oral tongue. Primary characteristics of the tumor is a depth of invasion of 24 mm with the closest margin of 8 mm. There were 14 lymph nodes that were procured from the ipsilateral hemineck none with evidence of metastatic disease. I had a detailed discussion with the patient today regarding the treatment options available to him. Based on the findings and our discussion in our multidisciplinary tumor board have recommended adjuvant radiation therapy based on his depth of invasion. I did describe to the patient the potential acute as well as long-term side effects of treatment. My goal is to deliver 60-64 Emely Beasley in 30-32 fractions. I have made arrangements for treatment planning to be conducted shortly. In our encounter today, I did describe to the patient the potential acute as well as long-term side effects for treatment and his consent was obtained. Given the winter months and his proximity to [facility] we have made arrangements for treatment at that location. I asked Ana Barnes  to contact us at any time for any questions or concerns. Thank you for the opportunity to participate in multidisciplinary management of this remarkable and pleasant patient. Bar Mancia MD, Aletha Schro[address], [address] [address], [address] [facility]    Department of Radiation Oncology  [facility]) [facility]: [phone] (UDW: [phone])  [address] [place] [address]: [phone] (YQY: [phone])  [facility]:  [phone] (WNR:  [phone])    NOTE: This report was transcribed using voice recognition software. Every effort was made to ensure accuracy; however, inadvertent computerized transcription errors may be present.

## 2022-12-06 NOTE — PROGRESS NOTES
Shai Gee  12/6/2022  10:07 AM      Vitals:    12/06/22 1004   BP: (!) 144/90   Pulse: 92   Resp: 18   Temp: 97 °F (36.1 °C)   SpO2: 97%    :     Wt Readings from Last 3 Encounters:   12/06/22 209 lb 9.6 oz (95.1 kg)   11/30/22 209 lb 12.8 oz (95.2 kg)   11/29/22 208 lb (94.3 kg)                Current Outpatient Medications:     hydrOXYzine (ATARAX) 10 MG/5ML syrup, , Disp: , Rfl:     melatonin 5 MG TBDP disintegrating tablet, Take 2 tablets by mouth nightly, Disp: 30 tablet, Rfl: 3    sennosides (SENOKOT) 8.8 MG/5ML syrup, 10 mLs by Per G Tube route nightly, Disp: 300 mL, Rfl: 0    polyethylene glycol (GLYCOLAX) 17 g packet, 17 g by Per G Tube route daily, Disp: 527 g, Rfl: 1    omeprazole (PRILOSEC) 40 MG delayed release capsule, Take 1 capsule by mouth every morning (before breakfast), Disp: 90 capsule, Rfl: 1    Cyanocobalamin (VITAMIN B 12 PO), Take 1 tablet by mouth daily, Disp: , Rfl:     metoprolol (LOPRESSOR) 100 MG tablet, Take 50 mg by mouth 2 times daily, Disp: , Rfl:     Polyvinyl Alcohol-Povidone (REFRESH OP), Apply 4 drops to eye in the morning and at bedtime Indications: bilateral eyes, Disp: , Rfl:     tetrahydrozoline 0.05 % ophthalmic solution, Place 4 drops into both eyes 2 times daily, Disp: , Rfl:     acetaminophen (TYLENOL) 500 MG tablet, Take 1,000 mg by mouth daily as needed for Pain, Disp: , Rfl:     loratadine (CLARITIN) 10 MG tablet, Take 10 mg by mouth daily PRN, Disp: , Rfl:     Multiple Vitamins-Minerals (CENTRUM SILVER PO), Take 1 tablet by mouth daily, Disp: , Rfl:     Ascorbic Acid (VITAMIN C) 1000 MG tablet, Take 1,000 mg by mouth daily, Disp: , Rfl:     zinc 50 MG TABS tablet, Take 100 mg by mouth 2 times daily, Disp: , Rfl:     Cholecalciferol (VITAMIN D3) 50 MCG (2000 UT) CAPS, Take 1 capsule by mouth daily, Disp: , Rfl:     vitamin B-6 (PYRIDOXINE) 100 MG tablet, Take 100 mg by mouth daily, Disp: , Rfl:     atorvastatin (LIPITOR) 10 MG tablet, Take 10 mg by mouth at bedtime, Disp: , Rfl:       Patient is seen today in follow up with Dr Dennise Ames. He had a left hemiglossectomy, neck dissection, and flap creation with Dr. Darnell Funes on 11/14/2022. Pathology presented moderately differentiated invasive squamous cell carcinoma of lesser cornu/hyoid margin, left tongue, with all margins negative. He follows with Dr Darnell Funes, last seen 11/29/2022, and will return to his office on 12/08/2022. He also follows with Dr Elle Murry for medical oncology, last seen 11/30/2022 and is recommending postoperative radiation, and omitting chemotherapy. Consent for RT to the neck to be completed at [address] [place] [address] in [facility] was obtained by the patient and scanned into [employer]. Education for RT to the head and neck provided during his initial consultation 10/11/2022. Questions and concerns addressed from a nursing perspective and both the patient and his wife verbalized understanding of care. FALLS RISK SCREENING ASSESSMENT    Instructions:  Assess the patient and enter the appropriate indicators that are present for fall risk identification. Total the numbers entered and assign a fall risk score from Table 2.  Reassess patient at a minimum every 12 weeks or with status change. Assessment   Date  12/6/2022     1. Mental Ability: confusion/cognitively impaired No - 0       2. Elimination Issues: incontinence, frequency No - 0       3. Ambulatory: use of assistive devices (walker, cane, off-loading devices), attached to equipment (IV pole, oxygen) No - 0     4. Sensory Limitations: dizziness, vertigo, impaired vision No - 0       5. Age 72 years or greater - 1       10. Medication: diuretics, strong analgesics, hypnotics, sedatives, antihypertensive agents   Yes - 3   7. Falls:  recent history of falls within the last 3 months (not to include slipping or tripping)   No - 0   TOTAL 4    If score of 4 or greater was education given?  Yes       TABLE 2   Risk Score Risk Level Plan of Care   0-3 Little or  No Risk 1. Provide assistance as indicated for ambulation activities  2. Reorient confused/cognitively impaired patient  3. Call-light/bell within patient's reach  4. Chair/bed in low position, stretcher/bed with siderails up except when performing patient care activities  5. Educate patient/family/caregiver on falls prevention  6.  Reassess in 12 weeks or with any noted change in patient condition which places them at a risk for a fall   4-6 Moderate Risk 1. Provide assistance as indicated for ambulation activities  2. Reorient confused/cognitively impaired patient  3. Call-light/bell within patient's reach  4. Chair/bed in low position, stretcher/bed with siderails up except when performing patient care activities  5. Educate patient/family/caregiver on falls prevention  6. Falls risk precaution (Yellow sticker Level II) placed on patient chart   7 or   Higher High Risk 1. Place patient in easily observable treatment room  2. Patient attended at all times by family member or staff  3. Provide assistance as indicated for ambulation activities  4. Reorient confused/cognitively impaired patient  5. Call-light/bell within patient's reach  6. Chair/bed in low position, stretcher/bed with siderails up except when performing patient care activities  7. Educate patient/family/caregiver on falls prevention  8. Falls risk precaution (Yellow sticker Level III) placed on patient chart           MALNUTRITION RISK SCREENING ASSESSMENT    12/6/2022   Patient:  Mandie Welsh  Sex:  male    Instructions:  Assess the patient and enter the appropriate indicators that are present for nutrition risk identification. Total the numbers entered and assign a risk score. Follow the appropriate action for total score listed below. Assessment   Date  12/6/2022     Have you lost weight without trying? 2- Yes, 5.1 kg to 10 kg     Have you been eating poorly because of a decreased appetite? 0- No   3. Do you have a diagnosis of head and neck cancer? 2- Yes                                                                                    TOTAL 4          Score of 0-1: No action  Score 2 or greater:   For Non-Diabetic Patient: Recommend adding Ensure Complete 2 x daily and provide patient with Ensure wellness bag with coupons  For Diabetic Patient: Recommend adding Glucerna Shake 2 x daily and provide patient with Glucerna Wellness bag with coupons  Route to the dietitian via Mayo Haley RN

## 2022-12-08 ENCOUNTER — OFFICE VISIT (OUTPATIENT)
Dept: OTOLARYNGOLOGY | Age: 67
End: 2022-12-08
Payer: MEDICARE

## 2022-12-08 VITALS — HEIGHT: 70 IN | WEIGHT: 209 LBS | BODY MASS INDEX: 29.92 KG/M2

## 2022-12-08 DIAGNOSIS — R13.10 DYSPHAGIA, UNSPECIFIED TYPE: ICD-10-CM

## 2022-12-08 DIAGNOSIS — Z93.0 TRACHEOSTOMY DEPENDENCE (HCC): Primary | ICD-10-CM

## 2022-12-08 DIAGNOSIS — C06.9 ORAL CANCER (HCC): ICD-10-CM

## 2022-12-08 DIAGNOSIS — C02.9 TONGUE CANCER (HCC): ICD-10-CM

## 2022-12-08 DIAGNOSIS — Z98.890 S/P FLAP GRAFT: ICD-10-CM

## 2022-12-08 DIAGNOSIS — S09.93XA INJURY OF TONGUE, INITIAL ENCOUNTER: ICD-10-CM

## 2022-12-08 DIAGNOSIS — C10.9 OROPHARYNX CANCER (HCC): ICD-10-CM

## 2022-12-08 PROCEDURE — G8417 CALC BMI ABV UP PARAM F/U: HCPCS | Performed by: OTOLARYNGOLOGY

## 2022-12-08 PROCEDURE — 1111F DSCHRG MED/CURRENT MED MERGE: CPT | Performed by: OTOLARYNGOLOGY

## 2022-12-08 PROCEDURE — 3017F COLORECTAL CA SCREEN DOC REV: CPT | Performed by: OTOLARYNGOLOGY

## 2022-12-08 PROCEDURE — 1036F TOBACCO NON-USER: CPT | Performed by: OTOLARYNGOLOGY

## 2022-12-08 PROCEDURE — 31575 DIAGNOSTIC LARYNGOSCOPY: CPT | Performed by: OTOLARYNGOLOGY

## 2022-12-08 PROCEDURE — G8427 DOCREV CUR MEDS BY ELIG CLIN: HCPCS | Performed by: OTOLARYNGOLOGY

## 2022-12-08 PROCEDURE — G8484 FLU IMMUNIZE NO ADMIN: HCPCS | Performed by: OTOLARYNGOLOGY

## 2022-12-08 PROCEDURE — 99024 POSTOP FOLLOW-UP VISIT: CPT | Performed by: OTOLARYNGOLOGY

## 2022-12-08 PROCEDURE — 1123F ACP DISCUSS/DSCN MKR DOCD: CPT | Performed by: OTOLARYNGOLOGY

## 2022-12-08 PROCEDURE — 14041 TIS TRNFR F/C/C/M/N/A/G/H/F: CPT | Performed by: OTOLARYNGOLOGY

## 2022-12-08 NOTE — PROGRESS NOTES
Dear Dr Rebeka Howard MD No ref. provider found     We had the pleasure of seeing Paola Britton, 1955 here    on 12/8/2022  Please see below for review of care and plans. Chief complaint-     ICD-10-CM    1. Tongue cancer ([facility] [address]  C02.9 FL MODIFIED BARIUM SWALLOW W VIDEO      2. Dysphagia, unspecified type  R13.10 FL MODIFIED BARIUM SWALLOW W VIDEO        11/14/22: left hemiglossectomy, left neck dissection, left ulnar forearm free flap reconstruction, and open tracheostomy for squamous cell carcinoma of the base of the tongue    History of Present Illness- 70 y/o male presents to our clinic for evaluation of tongue lesion. Left lateral tongue. Noticed approximately 2-3 months ago. Non painful, non tender. Tolerating PO intake. Denies cervical lymphadenopathy. Denies weight loss. Denies appetite change. Denies difficulty swallowing or drinking. Has had previous biopsy of tongue lesion which came back benign. Non smoker. Recent biopsy- pos scca    10/20/22: Pt here for 1 wk p/o Panendo. States pain was improving fri and sat after Sx, then sun night started w/ a burning pain in throat and cheek. Has a white patch on cheek that is causing concern of infection. Also states he has increased foul odor from mouth. Some difficulty swallowing when pain flares up. No change in voice since recovering from Panendo. Drinks 3-4 glasses of water, green tea periodically, and no alcohol. Weight is stable. 12/8/22: Pt here for follow up. Still with nutrition via PEG tube. Weight stable. Review of Systems- No drainage, discharge, or headache. Complete 10 system ROS completed and negative except as noted above. Physical Examination-   Vital Signs-Ht 5' 10\" (1.778 m)   Wt 209 lb (94.8 kg)   BMI 29.99 kg/m²     Ears- Tympanic membranes clear bilaterally. No middle ear effusion. No pre or post auricular tenderness. Nose- Nasal mucosa clear and dry.   No significant septal deviation or inferior turbinate hypertrophy. Oral Cavity/Oropharynx- Floor of mouth and tongue are soft and nontender. No posterior pharyngeal erythema. + gag reflex left lateral tongue/floor of mouth ~2cm plaque like lesion with submucosal firmness. + healing biopsy sites intraorally. Pink flap is intact-  Neck- Soft and nontender. No masses, lesions, lymphadenopathy, or thyroid nodules appreciated. Cranial Nerve- Cranial nerves II to XII intact. Extraocular muscles intact. No gross motor visual deficits. No spontaneous nystagmus. Face- No facial skin tenderness to palpation. Heart- No cyanosis, regular  Lungs- No stridor, no intercostal accessory muscle use  General- The patient is in no acute distress. A&O x3    Nasopharyngoscopy 12/8/22  Procedure note- after pt verbally consented, was sprayed nasally with 1:1 neosynephrine and xylocaine. Scope was passed and found nasal cavity with no lesion. nasopharynx clear, tonsil wnl without asymmetry, Tongue mobile with flap intact without dehisence and pink, vocal cords mobile bilaterally with full ab and ad duction. Hypopharynx clear, open and no masses. No scar at trach site, and able to see tube thru cords. Procedure note: planned staged procedure- Removal of excess flap tissue 12/8/22 due to redundancy of flap tissue and pt was biting this tissue and causing trauma to it and comprimising integrity of flap, flap revision was warranted of tongue flap. Area of was anesthetized with 3 cc lido with epinephrine 1:100,000 for hemostasis. The excess anterior tip of flap tissue was excised in an elipse and additional cuts made at ends to remove dog ears and edges were undermined and then tissue advanced in at both ends to close the defect in a fashion prevents it getting caught int he interocclusal line. Total of 4.5 x 3 cm - 13.5 cm sq. Hemostasis was maintained. Defect was repaired with 3-0 monocryl using horizontal mattress sutures and then with running interlocking.  Oral cavity was suctioned. Patient tolerated the procedure well. Procedure note: Decannulation  Fernando Ruck was removed. Bandage placed with mastisol, tegaderm and silk tape. Medical Decision Making and Treatment Plan. Pt seen and examined, scope exam with grossly normal findings. FNA of tongue done in office today. Will schedule for panendoscopy. Follow up in 1 week for path results review. R/B/A of surgery discussed with pt. Pt understood, consent signed, and would like to proceed to surgery. No personal or family history of bleeding or adverse reaction to anesthesia. Suspect will  need upfront surgical therapy with recon and then adjuvant therapy based upon pathology  Present at tumor board- recommend upfront surgery with neck and path driven adjuvant therapy as indicated. Pan consult- ordered  Panendo- results-  pos scca. Posterior base of tongue mapping near 19 was pos  Recommend partial glossectomy with neck dissection and forearm free flap for txment as mass is 4 cm and with margins will have sig tissue loss. Long dw pt, wife and daughter on process of surgery. Recommend preop peg and trach day of- they udnerstood  Talked about rehab and fact that all children live in [place], offered ability to seek second opinion there if they wanted, also offered to have rehab there if he needed it. 13. R/B/A of surgery discussed with pt. Pt understood, consent signed, and would like to proceed to surgery. No personal or family history of bleeding or adverse reaction to anesthesia. 14. Trach dependence- removed today after scope shoed clear airway and no ostruciton. 15. Flap revised to prevent further truama. 16. Dw pt on swallowing and new bite plane after revision and also to get po in as speech gave hiim instrucitons on what and how to eat- reinforced. Continue speech therapy as needed for post surgery  17. Pt discussed at multidisciplinary board - recommend adjuvant xrt alone, no chemo.  Dw pt and wife what this means and how he will heal      I spent 30 minutes with the patients care and >50% of this time on counseling or coordinating care in this advanced oral cancer patient in addition to the time spent on the procedures. Thank you for the opportunity to take part in the care of this very pleasant patient, Buffy Tadeo  Sincerely,          Basia Soria.  Darnell Funes M.D., Ph.D., [address] [place] [address]   Department of Otolaryngology-Head and Neck Surgery  Chief of Head & Neck Surgical Oncology  Director Head & Neck Oncology Service Line

## 2022-12-09 ENCOUNTER — TELEPHONE (OUTPATIENT)
Dept: CASE MANAGEMENT | Age: 67
End: 2022-12-09

## 2022-12-09 NOTE — TELEPHONE ENCOUNTER
Received call from Joel Aj at Dr. Ann Benson office stating they received the dental clearance paperwork, but Dr. Maricel Paez had sent patient back to his dentist, Dr. Barrie Hargrove. She said patient was seen on 12/07/2022. Angela Dickens, radiation RN at [facility] (1-), with an update. Chela Madrigal  RN, ADN, BSE, OCN  Patient Nurse Navigator

## 2022-12-09 NOTE — TELEPHONE ENCOUNTER
Contacted Dr. Salinas Reza office to check on patient's dental clearance.  stated patient hasn't been seen since 09/2022 and does not currently have an appointment. She stated she could not find the dental clearance paperwork or see a note in patient's chart for it. Informed her patient needs clearance prior to starting treatments and that the paperwork would be faxed to them. Requested it be completed and faxed back to the clinic. Dental clearance paperwork faxed to  [phone], confirmation received.  Jorge Gomez  RN, ADN, BSE, OCN  Patient Nurse Navigator

## 2022-12-13 ENCOUNTER — HOSPITAL ENCOUNTER (OUTPATIENT)
Dept: GENERAL RADIOLOGY | Age: 67
Discharge: HOME | End: 2022-12-15
Payer: MEDICARE

## 2022-12-13 DIAGNOSIS — R13.10 DYSPHAGIA, UNSPECIFIED TYPE: ICD-10-CM

## 2022-12-13 DIAGNOSIS — C02.9 TONGUE CANCER (HCC): ICD-10-CM

## 2022-12-13 PROCEDURE — 92611 MOTION FLUOROSCOPY/SWALLOW: CPT | Performed by: SPEECH-LANGUAGE PATHOLOGIST

## 2022-12-13 PROCEDURE — 2500000003 HC RX 250 WO HCPCS: Performed by: OTOLARYNGOLOGY

## 2022-12-13 PROCEDURE — 74230 X-RAY XM SWLNG FUNCJ C+: CPT

## 2022-12-13 PROCEDURE — 92526 ORAL FUNCTION THERAPY: CPT | Performed by: SPEECH-LANGUAGE PATHOLOGIST

## 2022-12-13 RX ADMIN — BARIUM SULFATE 45 G: 0.81 POWDER, FOR SUSPENSION ORAL at 09:59

## 2022-12-13 RX ADMIN — BARIUM SULFATE 45 ML: 400 PASTE ORAL at 09:58

## 2022-12-13 RX ADMIN — BARIUM SULFATE 45 ML: 400 SUSPENSION ORAL at 09:59

## 2022-12-13 NOTE — PROGRESS NOTES
SPEECH/LANGUAGE PATHOLOGY  VIDEOFLUOROSCOPIC STUDY OF SWALLOWING (MBS)   and PLAN OF CARE    PATIENT NAME:  Lesvia Damian  (male)     MRN:  [id number]    :  1955  (69 y.o.)  STATUS:  Inpatient: Room Room/bed info not found    TODAY'S DATE:  2022  REFERRING PROVIDER:   Dr. Arthur Styles: SLP video swallow  Date of order:  2022   REASON FOR REFERRAL: dysphagia    EVALUATING THERAPIST: WENDY Montes      RESULTS:      DYSPHAGIA DIAGNOSIS:  mild-moderate oropharyngeal phase dysphagia     DIET RECOMMENDATIONS:  Pureed consistency solids (IDDSI level 4) with  thin liquids (IDDSI level 0)    UNTIL CLEARED BY DR. Viral Davison FOR MASTICATION OF SOLIDS. PATIENT REPORTS STILL BITING TONGUE ON LEFT WITH GENERAL ORAL CLOSURE.     ALSO NO STRAW USE UNTIL CLEARED BY DR. Viral Davison    FEEDING RECOMMENDATIONS:    Assistance level:  No assistance needed     Compensatory strategies recommended: Alternate solids and liquids, Throat clear, and No straw     Discussed recommendations with nursing and/or faxed report to referring provider: Yes    Laryngeal Penetration and Aspiration:  Penetration WITHOUT aspiration was observed in today's study with  thin liquid, mildly thick liquid (nectar)--very shallow and clears well with spontaneous clearing swallows and or cued throat clear     SPEECH THERAPY  PLAN OF CARE   The dysphagia POC is established based on physician order and dysphagia diagnosis    Outpatient OR Home Care Skilled SLP intervention for dysphagia management is recommended 1-2 times per week to address the established treatment plan      Conditions Requiring Skilled Therapeutic Intervention for dysphagia:    Reduced oral control of bolus   Oral motor strength/coordination impairment  Reduced pharyngeal clearing of the bolus  Reduced laryngeal closure resulting in penetration  Swallow triggered when bolus head at level of laryngeal surface of epiglottis increasing risk of aspiration    SPECIFIC DYSPHAGIA INTERVENTIONS TO INCLUDE:     Training in positioning for improved integrity of swallow  Compensatory strategy training   Therapeutic exercises  Trials of upgraded diet/liquid --once cleared by surgeon     Specific instructions for next treatment:  development and training of compensatory swallow strategies to improve airway protection and swallow function, therapeutic po trial to determine safety of advanced diet textures and consistencies, ongoing PO analysis to upgrade diet and evaluate tolerance of current PO recommendation, initiate instruction of therapeutic exercises , and initiate instruction of compensatory strategies  Treatment Goals:    Short Term Goals:  Pt will implement identified compensatory swallowing strategies on 90% of opportunities or greater to improve airway protection and swallow function. Pt will participate in ongoing mealtime assessment to provide diet modification and compensatory strategy implementation to minimize risk of aspiration associated with PO intake  Pt will complete PO trials of upgraded diet textures with SLP only to determine the least restrictive PO diet to maintain adequate nutrition/hydration with no more than 1 overt s/s of pen/asp. Pt will complete oral motor strength/ coordination exercises to improve bolus prep/ control and mastication with  minimal verbal prompts .   Pt will complete laryngeal strength/ ROM therapeutic exercises to improve airway protection for the least restrictive PO diet minimal verbal prompts  Pt will complete Effortful Swallow therapeutically to target increased oral and base of tongue pressure, increased pharyngeal constrictor contractions, and increased UES relaxation duration to reduce pharyngeal residue with minimal verbal prompts     Long Term Goals:   Pt will improve oropharyngeal swallow function to ensure airway protection during PO intake to maintain adequate nutrition/hydration and decrease signs/symptoms of aspiration to less than 1 x/day. Patient/family Goal:    To be able to eat regular foods and drink regular liquids    Plan of care discussed with Patient and Family   The Patient and Family understand(s) the diagnosis, prognosis and plan of care     Rehabilitation Potential/Prognosis: good                      ADMITTING DIAGNOSIS: Tongue cancer ([facility] [C02.9]  Dysphagia, unspecified type [R13.10]     VISIT DIAGNOSIS:   Visit Diagnoses         Codes    Tongue cancer ([facility]     C02.9    Dysphagia, unspecified type     R13.10                PATIENT REPORT/COMPLAINT: patient currently NPO pending results of this evaluation    PRIOR LEVEL OF SWALLOW FUNCTION:    Past History of Dysphagia?:  none reported    Home diet: NPO -- including medications. Nutrition, fluids and medication to be administered through current NG/PEG tube. Current Diet Order:  No diet orders on file    PROCEDURE:  Consistencies Administered During the Evaluation   Liquids: thin liquid, nectar thick liquid, and honey thick liquid   Solids:  pureed foods      Method of Intake:   cup, spoon  Self fed      Position:   Seated, upright, Lateral plane    INSTRUMENTAL ASSESSMENT:      MBSImP Results:   Lip closure for intraoral bolus containment resulted in no labial escape. Tongue control during bolus hold maintained a cohesive bolus held between tongue to palate seal. Bolus preparation and mastication solid not appropriate at this time. Bolus transport/lingual motion was with repetitive tongue motion. Oral residue was observed but patient exhibited fair ability to recollect bolus with lingual movements as items were thicker he did need a thin liquid chaser to recollect and propel posteriorly. Initiation of the pharyngeal swallow occurred as the bolus head reached the laryngeal surface of the epiglottis. Soft palate elevation resulted in no bolus between the soft palate and the pharyngeal wall.  Laryngeal elevation demonstrated complete superior movement of the thyroid cartilage with complete approximation of the arytenoids to the epiglottic petiole. Anterior hyoid excursion demonstrated complete anterior movement. Epiglottic movement resulted in complete inversion. Laryngeal vestibule closure was incomplete, as indicated by trace column of air or contrast within the laryngeal vestibule at the height of the swallow. Pharyngeal stripping wave was present and complete. Pharyngeal contraction could not be determined due to logistical reasons not related to physiologic impairment. Pharyngoesophageal segment opening was completely distended for complete duration with no obstruction of bolus flow. Tongue base retraction allowed trace contrast between the retracted tongue base and the posterior pharyngeal wall. Pharyngeal residue was minimally present cleared independently. Esophageal clearance in the upright position could not be assessed due to logistical reasons not related to physiologic impairment. PENETRATION-ASPIRATION SCALE (PAS):  THIN 2 = Material enters the airway, remains above vocal folds, and is ejected from the airway   MILDLY THICK 2 = Material enters the airway, remains above vocal folds, and is ejected from the airway   MODERATELY THICK 1 = Material does not enter the airway  PUREE 1 = Material does not enter the airway  HARD SOLID item not administered       COMPENSATORY STRATEGIES    Compensatory strategies that were beneficial included Double swallow and Throat clear      STRUCTURAL/FUNCTIONAL ANOMALIES   Anatomical changes consistent with post surgical status    CERVICAL ESOPHAGEAL STAGE :     The cervical esophagus appeared adequate          ___________    Cognition:   Alert & Oriented x 4    Oral Peripheral Examination   Left labiobuccal weakness consistent with post surgical changes.   Patient with good tongue base and anterior tongue movement as well as fair ability for lateralization     Current Respiratory Status   room air trach removed last Thursday no audible air leak with speech still has dressing in place per patient will be changed on Thursday at next appt with Dr. Esteban Sandoval of Speech Production  Respiration:  Adequate for speech production  Quality:   Within functional limits  Intensity: Within functional limits    Pain: No pain reported. EDUCATION:   The Speech Language Pathologist (SLP) completed education regarding results of evaluation and that intervention is warranted at this time. Learner: Patient and Family  Education: Reviewed results and recommendations of this evaluation  Evaluation of Education:  Enrike Mirza understanding    This plan may be re-evaluated and revised as warranted. Evaluation Time includes thorough review of current medical information, gathering information on past medical history/social history and prior level of function, completion of standardized testing/informal observation of tasks, assessment of data and education on plan of care and goals. [x]The admitting diagnosis and active problem list, have been reviewed prior to initiation of this evaluation. 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 Reviewed current solid/liquid consistency diet recommendation for   Pureed consistency solids (IDDSI level 4) with thin liquids (IDDSI level 0)and discussed compensatory strategies to ensure safe PO intake. Reviewed aspiration precautions.   Discussed use of Double swallow and Throat clear to decrease risk of aspiration with implementation of strengthening exercises to improve swallow function as the long term goal.  Patient was able to restate compensatory strategies during session. .  will continue POC.        [id number]  dysphagia tx    Josselyn Morales MSCCC/SLP  Speech Language Pathologist  [id number]

## 2022-12-14 ENCOUNTER — HOSPITAL ENCOUNTER (OUTPATIENT)
Dept: RADIATION ONCOLOGY | Age: 67
Discharge: HOME | End: 2022-12-14
Payer: MEDICARE

## 2022-12-14 PROBLEM — Z01.812 PRE-OPERATIVE LABORATORY EXAMINATION: Status: RESOLVED | Noted: 2022-11-14 | Resolved: 2022-12-14

## 2022-12-14 PROCEDURE — 77334 RADIATION TREATMENT AID(S): CPT | Performed by: SPECIALIST

## 2022-12-14 PROCEDURE — 6360000004 HC RX CONTRAST MEDICATION: Performed by: SPECIALIST

## 2022-12-14 RX ADMIN — IOPAMIDOL 125 ML: 755 INJECTION, SOLUTION INTRAVENOUS at 09:45

## 2022-12-14 NOTE — PROGRESS NOTES
12/15/22: Pt here for 1 wk f/u after trach removal and tongue reduction. No pain. Had MBSS done and is able to start taking liquids PO. No difficulty swallowing liquids. No change in voice. Drinks 4-5 bottles of water, no caffeine, and no alcohol. Weight is stable.

## 2022-12-15 ENCOUNTER — OFFICE VISIT (OUTPATIENT)
Dept: ENT CLINIC | Age: 67
End: 2022-12-15
Payer: MEDICARE

## 2022-12-15 VITALS
BODY MASS INDEX: 30.61 KG/M2 | DIASTOLIC BLOOD PRESSURE: 81 MMHG | HEART RATE: 77 BPM | SYSTOLIC BLOOD PRESSURE: 126 MMHG | HEIGHT: 70 IN | WEIGHT: 213.8 LBS

## 2022-12-15 DIAGNOSIS — Z93.0 TRACHEOSTOMY DEPENDENCE (HCC): ICD-10-CM

## 2022-12-15 DIAGNOSIS — R13.12 OROPHARYNGEAL DYSPHAGIA: ICD-10-CM

## 2022-12-15 DIAGNOSIS — R13.10 ODYNOPHAGIA: ICD-10-CM

## 2022-12-15 DIAGNOSIS — Z93.0 STATUS POST TRACHEOSTOMY (HCC): Primary | ICD-10-CM

## 2022-12-15 DIAGNOSIS — C06.9 ORAL CANCER (HCC): ICD-10-CM

## 2022-12-15 PROCEDURE — G8484 FLU IMMUNIZE NO ADMIN: HCPCS | Performed by: OTOLARYNGOLOGY

## 2022-12-15 PROCEDURE — 31575 DIAGNOSTIC LARYNGOSCOPY: CPT | Performed by: OTOLARYNGOLOGY

## 2022-12-15 PROCEDURE — 99213 OFFICE O/P EST LOW 20 MIN: CPT | Performed by: OTOLARYNGOLOGY

## 2022-12-15 PROCEDURE — 1123F ACP DISCUSS/DSCN MKR DOCD: CPT | Performed by: OTOLARYNGOLOGY

## 2022-12-15 PROCEDURE — G8417 CALC BMI ABV UP PARAM F/U: HCPCS | Performed by: OTOLARYNGOLOGY

## 2022-12-15 PROCEDURE — G8427 DOCREV CUR MEDS BY ELIG CLIN: HCPCS | Performed by: OTOLARYNGOLOGY

## 2022-12-15 PROCEDURE — 1111F DSCHRG MED/CURRENT MED MERGE: CPT | Performed by: OTOLARYNGOLOGY

## 2022-12-15 PROCEDURE — 1036F TOBACCO NON-USER: CPT | Performed by: OTOLARYNGOLOGY

## 2022-12-15 PROCEDURE — 3017F COLORECTAL CA SCREEN DOC REV: CPT | Performed by: OTOLARYNGOLOGY

## 2022-12-20 ENCOUNTER — HOSPITAL ENCOUNTER (OUTPATIENT)
Dept: RADIATION ONCOLOGY | Age: 67
Discharge: HOME | End: 2022-12-20
Payer: MEDICARE

## 2022-12-20 PROCEDURE — 77300 RADIATION THERAPY DOSE PLAN: CPT | Performed by: SPECIALIST

## 2022-12-20 PROCEDURE — 77338 DESIGN MLC DEVICE FOR IMRT: CPT | Performed by: SPECIALIST

## 2022-12-20 PROCEDURE — 77301 RADIOTHERAPY DOSE PLAN IMRT: CPT | Performed by: SPECIALIST

## 2022-12-20 NOTE — PROGRESS NOTES
Dear Dr Jessica Rush MD No ref. provider found     We had the pleasure of seeing Singh Morales, 1955 here    on 12/20/2022  Please see below for review of care and plans. Chief complaint-   No diagnosis found. 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 Left lateral tongue. Noticed approximately 2-3 months ago. Non painful, non tender. Tolerating PO intake. Denies cervical lymphadenopathy. Denies weight loss. Denies appetite change. Denies difficulty swallowing or drinking. Has had previous biopsy of tongue lesion which came back benign. Non smoker. Recent biopsy- pos scca    10/20/22: Pt here for 1 wk p/o Panendo. States pain was improving fri and sat after Sx, then sun night started w/ a burning pain in throat and cheek. Has a white patch on cheek that is causing concern of infection. Also states he has increased foul odor from mouth. Some difficulty swallowing when pain flares up. No change in voice since recovering from Panendo. Drinks 3-4 glasses of water, green tea periodically, and no alcohol. Weight is stable. 12/8/22: Pt here for follow up. Still with nutrition via PEG tube. Weight stable. Trach eval    12/15/22: Pt here for 1 wk f/u after trach removal and tongue reduction. No pain. Had MBSS done and is able to start taking liquids PO. No difficulty swallowing liquids. No change in voice. Drinks 4-5 bottles of water, no caffeine, and no alcohol. Weight is stable. Review of Systems- No drainage, discharge, or headache. Complete 10 system ROS completed and negative except as noted above.      Physical Examination-   Vital Signs-/81   Pulse 77   Ht 5' 10\" (1.778 m)   Wt 213 lb 12.8 oz (97 kg)   BMI 30.68 kg/m²     Ears- Tympanic membranes clear bilaterally. No middle ear effusion. No pre or post auricular tenderness. Nose- Nasal mucosa clear and dry. No significant septal deviation or inferior turbinate hypertrophy. Oral Cavity/Oropharynx- Floor of mouth and tongue are soft and nontender. No posterior pharyngeal erythema. + gag reflex left lateral tongue/floor of mouth ~2cm plaque like lesion with submucosal firmness. + healing biopsy sites intraorally. Pink flap is intact- incision intact with les biting of tongue  Neck- Soft and nontender. No masses, lesions, lymphadenopathy, or thyroid nodules appreciated. Cranial Nerve- Cranial nerves II to XII intact. Extraocular muscles intact. No gross motor visual deficits. No spontaneous nystagmus. Face- No facial skin tenderness to palpation. Heart- No cyanosis, regular  Lungs- No stridor, no intercostal accessory muscle use  General- The patient is in no acute distress. A&O x3    Nasopharyngoscopy 12/15/22  Procedure note- after pt verbally consented, was sprayed nasally with 1:1 neosynephrine and xylocaine. Scope was passed and found nasal cavity with no lesion. nasopharynx clear, tonsil wnl without asymmetry, Tongue mobile with flap intact without dehisence and pink, vocal cords mobile bilaterally with full ab and ad duction. Hypopharynx clear, open and no masses. No scar at trach site, and able to see tube thru cords, secretions much less and controlled    Procedure note: Removal of excess flap tissue 12/8/22 due to redundancy of flap tissue and pt was biting this tissue and causing trauma to it and comprimising integrity of flap, flap revision was warranted of tongue flap. Area of was anesthetized with 3 cc lido with epinephrine 1:100,000 for hemostasis.  The excess anterior tip of flap tissue was excised in an elipse and additional cuts made at ends to remove dog ears and edges were undermined and then tissue advanced in at both ends to close the defect in a fashion prevents it getting caught int he interocclusal line. Total of 4.5 x 3 cm - 13.5 cm sq. Hemostasis was maintained. Defect was repaired with 3-0 monocryl using horizontal mattress sutures and then with running interlocking. Oral cavity was suctioned. Patient tolerated the procedure well. Procedure note: Decannulation  Jadyn Backer was removed. Bandage placed with mastisol, tegaderm and silk tape. Medical Decision Making and Treatment Plan. Pt seen and examined, scope exam with grossly normal findings. FNA of tongue done in office today. Will schedule for panendoscopy. Follow up in 1 week for path results review. R/B/A of surgery discussed with pt. Pt understood, consent signed, and would like to proceed to surgery. No personal or family history of bleeding or adverse reaction to anesthesia. Suspect will  need upfront surgical therapy with recon and then adjuvant therapy based upon pathology  Present at tumor board- recommend upfront surgery with neck and path driven adjuvant therapy as indicated. Pan consult- ordered  Panendo- results-  pos scca. Posterior base of tongue mapping near 19 was pos  Recommend partial glossectomy with neck dissection and forearm free flap for txment as mass is 4 cm and with margins will have sig tissue loss. Long dw pt, wife and daughter on process of surgery. Recommend preop peg and trach day of- they udnerstood  Talked about rehab and fact that all children live in [place], offered ability to seek second opinion there if they wanted, also offered to have rehab there if he needed it. 13. R/B/A of surgery discussed with pt. Pt understood, consent signed, and would like to proceed to surgery. No personal or family history of bleeding or adverse reaction to anesthesia. 14. Trach dependence- removed after scope shoed clear airway and no ostruciton. - doing well, secretions less and controlling. Trach site intact with clear airway and no scar or grans.   15. Flap revised to prevent further светлана. 16. Dw pt on swallowing and new bite plane after revision and also to get po in as speech gave hiim instrucitons on what and how to eat- reinforced. Continue speech therapy as needed for post surgery  17. Pt discussed at multidisciplinary board - recommend adjuvant xrt alone, no chemo. Dw pt and wife what this means and how he will heal      Thank you for the opportunity to take part in the care of this very pleasant patient, Rc Cervantes  Sincerely,          Mireya Sibley.  Cristofer Marcos M.D., Ph.D., [address] [place] [address]   Department of Otolaryngology-Head and Neck Surgery  Chief of Head & Neck Surgical Oncology  Director Head & Neck Oncology Service Line

## 2022-12-21 NOTE — OP NOTE
Operative Note      Patient: Ana Barnes  YOB: 1955  MRN: [id number]    Date of Procedure: 11/14/2022    Pre-Op Diagnosis: Tongue mass [K14.8]  Cancer of oral cavity ([facility] [address] [C06.9]    Post-Op Diagnosis: Same       Procedure(s):  LEFT NECK DISSECTION I- IV    Surgeon(s):  DO Eleni Ovalles MD    Assistant:   Surgical Assistant: Casper Casarez  Resident: Gary Torre DO; Debra Morales DO; Chata Bennett DO    Anesthesia: General    Estimated Blood Loss (mL): 33NO    Complications: None    Specimens:   ID Type Source Tests Collected by Time Destination   A : LESSER CORNU HYOID MARGIN Tissue Tissue SURGICAL PATHOLOGY Eleni Escamilla MD 11/14/2022 1135    B : LEFT NECK LEVEL 1A, 1B Tissue Tissue SURGICAL PATHOLOGY Eleni Escamilla MD 11/14/2022 1146    C : LEFT HEMIGLOSSECTOMY Tissue Tissue SURGICAL PATHOLOGY Eleni Escamilla MD 11/14/2022 1159    D : Art Enriquez  Tissue Tissue SURGICAL PATHOLOGY Eleni Escamilla MD 11/14/2022 1215    E : HYOID BONE AND HYPOGLOSSUS MUSCLE. BLACK PAEZ POSITIVE FROZEN SECTION. HEMIGLOSSECTOMY MARGIN. YELLOW LATERAL HYOID BONE MARGIN. BLUE MEDIAL HYOID BONE MARGIN. ORANGE PATIENT DEEP MUSCLE MARGIN. Tissue Tissue SURGICAL PATHOLOGY Eleni Escamilla MD 11/14/2022 1221    F : LEFT NECK CONTENTS LEVELS 2 THROUGH 4  Tissue Tissue SURGICAL PATHOLOGY Eleni Escamilla MD 11/14/2022 1321        Implants:  Implant Name Type Inv.  Item Serial No.  Lot No. LRB No. Used Action   GEM  MICROVASCULAR  ANASTOMOTIC  DEVICE 3.0MM   [id number]  [id number] Left 1 Implanted    2MM GEMMA VASC - [id number] Anastomosis rings  2MM GEMMA VASC  SYNOV[employer] ZAPR [id number] Left 1 Implanted         Drains:   Gastrostomy/Enterostomy/Jejunostomy Tube LUQ 20 fr (Active)   Drainage Appearance None 11/20/22 2130   Site Description Clean, dry & intact 11/20/22 0627   G Port Status Clamped 11/20/22 2130 Surrounding Skin Clean, dry & intact 11/20/22 2130   Dressing Status Clean, dry & intact 11/20/22 2130   Dressing Type Split gauze 11/20/22 2130   G-Tube Care Completed Yes 11/18/22 0900   Tube Feeding Other Tube Feeding (must specify product in comment) 11/19/22 1500   Tube feeding/verify rate (mL/hr) 0 mL/hr 11/19/22 1500   Tube Feeding Intake (mL) 240 ml 11/21/22 1700   Tube Feeding Supplement Amount (mL) 120 mL 11/20/22 1630   Free Water/Flush (mL) 240 mL 11/21/22 1700   Residual Volume (ml) 0 ml 11/21/22 1700       [REMOVED] Closed/Suction Drain Left; Anterior Other (Comment) Bulb (Removed)   Site Description Clean, dry & intact 11/15/22 0600   Dressing Status Clean, dry & intact 11/15/22 0600   Drainage Appearance Bloody;Bright red 11/15/22 0600   Drain Status To bulb suction 11/15/22 0600   Output (ml) 20 ml 11/15/22 0527       [REMOVED] Closed/Suction Drain Left; Anterior Neck Bulb (Removed)   Site Description Clean, dry & intact 11/20/22 0627   Dressing Status Clean, dry & intact 11/20/22 0627   Drainage Appearance Serosanguinous 11/20/22 0627   Drain Status To bulb suction 11/20/22 0627   Output (ml) 0 ml 11/20/22 0627       [REMOVED] Closed/Suction Drain Left; Anterior Neck Bulb (Removed)   Site Description Clean, dry & intact 11/20/22 0627   Dressing Status Clean, dry & intact 11/20/22 0627   Drainage Appearance Serosanguinous 11/20/22 0627   Drain Status To bulb suction 11/20/22 0627   Output (ml) 0 ml 11/20/22 0627       [REMOVED] Closed/Suction Drain Left; Anterior Elbow Bulb (Removed)   Site Description Clean, dry & intact 11/20/22 0627   Dressing Status Clean, dry & intact 11/20/22 0627   Drainage Appearance Serosanguinous 11/20/22 0627   Drain Status To bulb suction 11/20/22 1802   Output (ml) 20 ml 11/20/22 0627       [REMOVED] Urinary Catheter 11/14/22 2 Way (Removed)   $ Urethral catheter insertion Inserted for procedure 11/15/22 0000   Catheter Indications Urinary retention (acute or chronic), continuous bladder irrigation or bladder outlet obstruction; Need for fluid volume management of the critically ill patient in a critical care setting 11/16/22 0600   Site Assessment No urethral drainage 11/16/22 0600   Urine Color Yellow 11/16/22 0600   Urine Appearance Clear 11/16/22 0600   Collection Container Standard 11/16/22 0600   Securement Method Securing device (Describe) 11/16/22 0600   Catheter Care Completed Yes 11/15/22 2000   Catheter Best Practices  Drainage tube clipped to bed; Tamper seal intact; Bag below bladder;Bag not on floor; Lack of dependent loop in tubing;Drainage bag less than half full 11/16/22 0600   Status Draining;Patent 11/16/22 0600   Output (mL) 375 mL 11/16/22 1500       Indications for procedure: This is a 68 y/o male with hx of progressively enlarging left tongue mass with 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 Risks benefits alternatives discussed including but not limited to bleeding infection, damage to adjacent structures, cranial nerve deficits, death. Patient understood and agreed to proceed. Detailed Description of Procedure: The patient was taken to Operating Room, and was placed on the operating room table in the supine position. After adequate general endotracheal anesthesia was administered, a shoulder roll placed under the shoulders and the face was placed in an extended fashion. The left neck, chest, and face were prepped with Betadine and draped in a sterile fashion. On the left neck, incision was marked extending from mastoid tip in a curved fashion onto the neck in a relaxed skin line around the level of the cricoid cartilage. 10 cc 1%lidocaine with epi 1:[id number] was injected into neck in placed surgical incision. A left skin incision was performed, extending from mastoid tip in a curved fashion onto the neck in a relaxed skin line around the level of the cricoid cartilage, through skin, subcutaneous tissue and platysma with a 15 blade.  Subplatysmal superior and inferior flaps were raised. The dissection was left lateral neck dissection encompassing zones 1, 2A, 2B, 3, and the superior portion of 4. The marginal mandibular nerve was identitied, dissected and preserved out of the field of dissection. The submandibular gland was dissected free from surrounding tissue, the duct was identified, and ligated with 2 medium clips and harmonic scalpel. The submandibular gland was sent individually off the field for permanent pathologic review. The fascia and tissue superficial to the anterior belly of digastric was dissected free, encopassing level IA and dissection was continued releasing the tissue off of the mylohyoid muscle and posterior belly of digastric muscle to encompass neck contents IA and IB, superior the dissection was continued over the inferior border of the mandible to encompass perifacial lymph nodes in the dissection. Left level Ia and Ib packet was then removed and handed off the field for pathologic review. Attention was then turned levels II-IV of the neck. The sternocleidomastoid muscle was unwrapped at its fascial attachment and this was taken back posteriorly to the entrance of the XIth cranial nerve into the superior posterior most triangle of the neck. Level IIb was dissected and after the XI cranial nerve was dissected 360 degrees, level IIb was dunked under the nerve and kept in continuity with the rest of the neck contents. The neck contents were carried off of the deep rooted muscles including the splenius capitis and anterior and middle scalenes from laterally to medially preserving the spinal nerve roots off of these muscles including the fascia of the muscles, stripped from the carotid artery, the X cranial nerve, the internal jugular vein. The X, XI, and XII cranial nerves were preserved. The internal jugular vein and carotid artery were preserved as well.  Copious irrigation of the wound bed showed no identifiable bleeding at the termination of the procedure. The patient was then handed back to Dr. Silvia Hare for completion of tongue excision and free flap construction. Dr Anh Elmore was present and scrubbed for the entire case.      Electronically signed by Nereyda Marshall DO on 12/21/2022 at 10:35 AM

## 2022-12-28 ENCOUNTER — APPOINTMENT (OUTPATIENT)
Dept: RADIATION ONCOLOGY | Age: 67
End: 2022-12-28
Payer: MEDICARE

## 2022-12-29 ENCOUNTER — APPOINTMENT (OUTPATIENT)
Dept: RADIATION ONCOLOGY | Age: 67
End: 2022-12-29
Payer: MEDICARE

## 2022-12-30 ENCOUNTER — APPOINTMENT (OUTPATIENT)
Dept: RADIATION ONCOLOGY | Age: 67
End: 2022-12-30
Payer: MEDICARE

## 2023-01-03 ENCOUNTER — HOSPITAL ENCOUNTER (OUTPATIENT)
Dept: RADIATION ONCOLOGY | Age: 68
Discharge: HOME | End: 2023-01-03
Payer: MEDICARE

## 2023-01-03 ENCOUNTER — TELEPHONE (OUTPATIENT)
Dept: RADIATION ONCOLOGY | Age: 68
End: 2023-01-03

## 2023-01-03 VITALS
DIASTOLIC BLOOD PRESSURE: 77 MMHG | WEIGHT: 215.6 LBS | HEART RATE: 67 BPM | OXYGEN SATURATION: 98 % | SYSTOLIC BLOOD PRESSURE: 132 MMHG | BODY MASS INDEX: 30.94 KG/M2 | RESPIRATION RATE: 18 BRPM | TEMPERATURE: 97 F

## 2023-01-03 DIAGNOSIS — C76.0 HEAD AND NECK CANCER (HCC): Primary | ICD-10-CM

## 2023-01-03 PROCEDURE — 77014 CHG CT GUIDANCE RADIATION THERAPY FLDS PLACEMENT: CPT | Performed by: SPECIALIST

## 2023-01-03 PROCEDURE — 77386 HC NTSTY MODUL RAD TX DLVR CPLX: CPT | Performed by: SPECIALIST

## 2023-01-03 NOTE — PROGRESS NOTES
Nuria Gross  1/3/2023  Wt Readings from Last 3 Encounters:   01/03/23 215 lb 9.6 oz (97.8 kg)   12/15/22 213 lb 12.8 oz (97 kg)   12/08/22 209 lb (94.8 kg)     Body mass index is 30.94 kg/m². Treatment Area:Left Lateral Tongue    Patient was seen today for weekly visit. Comfort Alteration  KPS:90%  Fatigue: None    Mucous Membrane Alteration  Mucositis Due to Radiation: Yes  Thrush: Yes, left side of tongue  Voice Changes: No    Nutritional Alteration  Anorexia: No   Nausea: No   Vomiting: No     Skin Alteration   Sensation:none    Radiation Dermatitis:  no    Ventilation Alteration  Cough:No    Emotional  Coping: effective    Injury, potential bleeding or infection: no    Radioprotectant Tolerance  na            Lab Results   Component Value Date    WBC 8.0 11/21/2022     11/21/2022         /77   Pulse 67   Temp 97 °F (36.1 °C) (Temporal)   Resp 18   Wt 215 lb 9.6 oz (97.8 kg)   SpO2 98%   BMI 30.94 kg/m²   BP within normal range?  yes       Assessment/Plan:1/33fx;200/6200cGy completed    Selina Childers RN

## 2023-01-03 NOTE — PROGRESS NOTES
Taylor Bullock  1/3/2023  Wt Readings from Last 3 Encounters:   12/15/22 213 lb 12.8 oz (97 kg)   12/08/22 209 lb (94.8 kg)   12/06/22 209 lb 9.6 oz (95.1 kg)     There is no height or weight on file to calculate BMI. Treatment Area:Left Lateral Tongue    Patient was seen today for weekly visit. Comfort Alteration  KPS:90%  Fatigue: None    Mucous Membrane Alteration  Mucositis Due to Radiation: Yes  Thrush: Yes, left side of tongue  Voice Changes: No    Nutritional Alteration  Anorexia: No   Nausea: No   Vomiting: No     Skin Alteration   Sensation:none    Radiation Dermatitis:  no    Ventilation Alteration  Cough:No    Emotional  Coping: effective    Injury, potential bleeding or infection: no    Radioprotectant Tolerance  na            Lab Results   Component Value Date    WBC 8.0 11/21/2022     11/21/2022         There were no vitals taken for this visit. BP within normal range? yes       Assessment/Plan:1/31fx;200/6200cGy completed.  Duplicate note    Jomar Rhoades RN

## 2023-01-03 NOTE — TELEPHONE ENCOUNTER
Levorn Even  1/3/2023  Wt Readings from Last 10 Encounters:   01/03/23 215 lb 9.6 oz (97.8 kg)   12/15/22 213 lb 12.8 oz (97 kg)   12/08/22 209 lb (94.8 kg)   12/06/22 209 lb 9.6 oz (95.1 kg)   11/30/22 209 lb 12.8 oz (95.2 kg)   11/29/22 208 lb (94.3 kg)   11/23/22 226 lb (102.5 kg)   11/18/22 226 lb 12.8 oz (102.9 kg)   11/08/22 220 lb (99.8 kg)   11/07/22 225 lb (102.1 kg)     Ht Readings from Last 1 Encounters:   12/15/22 5' 10\" (1.778 m)     BMI=30.94    Assessment: Visited with Moriah Razo and his wife while in for OTV with Dr. Sanjiv Wall. Moriah Razo has begun his radiation treatments. No side effects yet from radiation. He has been started on a pureed diet with thin liquids after MBS on 12/13/22. He previously was increased to TwoCal HN 5 cartons per day when he was NPO but since oral diet has begun, he has reduced to 4 cartons. Discussed as intake increases can further reduce to 3 cartons as warranted by maintaining weight. But also discussed as radiation continues, increased difficulty and pain with eating may occur. Moriah Razo voices he does not want to gain weight and discussed trying to keep weight between 210-215#. His next appointment with the SLP is scheduled for today. Current foods being utilized include mashed potatoes, pureed soups, yogurt, jello and applesauce. Discussed most food can be pureed in a , discussed using bread in vegetables and soups to help with consistency. Patient and wife seem to have a good understanding of adjusting tube feeding based on oral intake and weight. Denied N/V/D/C, problems with PEG tube/supplies/EN administration. Encouraged them to call with questions or concerns as they arise. Weight change: 2.86% weight gain x 1 month, 5.85% weight loss x 3 months  Appetite: Good appetite, has started oral intake after MBS on 12/13/22 of pureed diet, thin liquids, no straws. Continues to utilize PEG tube for Two Jayjay HN, using 4 cartons since oral intake has started. Continues to use Prosource 30ml BID. Nutritional Side Effects: tongue soreness  Calculated Needs: 28-30kcal/kg/PIU=9407-8669gbocg, 1.3-1.5gm/kg/DLL=583-224fg protein, 1ml/kcal=2700-2900ml fluids  Malnutrition Status: At risk  Nutrition Diagnosis: At risk r/t SCC of base of tongue. Recommendations: Utilize PEG tube for EN of Two Jayjay HN 3-4 cartons a day, Prosource 30ml twice per day, water flush 360ml for each carton of Two Jayjay HN. Pureed diet with thin liquids as determined by SLP and Dr. Sd Jerez.     Carloz Urbina RD

## 2023-01-04 ENCOUNTER — HOSPITAL ENCOUNTER (OUTPATIENT)
Dept: RADIATION ONCOLOGY | Age: 68
Discharge: HOME | End: 2023-01-04
Payer: MEDICARE

## 2023-01-04 PROCEDURE — 77014 CHG CT GUIDANCE RADIATION THERAPY FLDS PLACEMENT: CPT | Performed by: SPECIALIST

## 2023-01-04 PROCEDURE — 77386 HC NTSTY MODUL RAD TX DLVR CPLX: CPT | Performed by: SPECIALIST

## 2023-01-04 NOTE — PATIENT INSTRUCTIONS
Continue daily fractionated radiation therapy as scheduled. Please see weekly OTV note and intial consultation letter in [facility] for clinical details. Yumiko Rivas. Rolando Guerin MD MS [address] [facility]:  [phone]   FAX: [phone] [address] [facility]:  [phone]   FAX:    [phone]  [address] [place] [address] Road:  [phone]   FAX:  [phone]  Email: [email or web address]

## 2023-01-04 NOTE — PROGRESS NOTES
DEPARTMENT OF RADIATION ONCOLOGY ON TREATMENT VISIT         1/4/2023      NAME:  Yessy Grover OF BIRTH:  1955    Diagnosis: HN CA    SUBJECTIVE:   David Saxena has now received fractionated external beam radiation therapy - ongoing. Past medical, surgical, social and family histories reviewed and updated as indicated. Pain: controlled    ALLERGIES:  Patient has no known allergies. Current Outpatient Medications   Medication Sig Dispense Refill    hydrOXYzine (ATARAX) 10 MG/5ML syrup       melatonin 5 MG TBDP disintegrating tablet Take 2 tablets by mouth nightly 30 tablet 3    omeprazole (PRILOSEC) 40 MG delayed release capsule Take 1 capsule by mouth every morning (before breakfast) 90 capsule 1    Cyanocobalamin (VITAMIN B 12 PO) Take 1 tablet by mouth daily      metoprolol (LOPRESSOR) 100 MG tablet Take 50 mg by mouth 2 times daily      Polyvinyl Alcohol-Povidone (REFRESH OP) Apply 4 drops to eye in the morning and at bedtime Indications: bilateral eyes      tetrahydrozoline 0.05 % ophthalmic solution Place 4 drops into both eyes 2 times daily      acetaminophen (TYLENOL) 500 MG tablet Take 1,000 mg by mouth daily as needed for Pain      loratadine (CLARITIN) 10 MG tablet Take 10 mg by mouth daily PRN      Multiple Vitamins-Minerals (CENTRUM SILVER PO) Take 1 tablet by mouth daily      Ascorbic Acid (VITAMIN C) 1000 MG tablet Take 1,000 mg by mouth daily      zinc 50 MG TABS tablet Take 100 mg by mouth 2 times daily      Cholecalciferol (VITAMIN D3) 50 MCG (2000 UT) CAPS Take 1 capsule by mouth daily      vitamin B-6 (PYRIDOXINE) 100 MG tablet Take 100 mg by mouth daily      atorvastatin (LIPITOR) 10 MG tablet Take 10 mg by mouth at bedtime       No current facility-administered medications for this encounter. OBJECTIVE:  Alert and fully ambulatory. Pleasant and conversant.         Physical Examination: General appearance - alert, well appearing, and in no distress. Wt Readings from Last 3 Encounters:   01/03/23 215 lb 9.6 oz (97.8 kg)   12/15/22 213 lb 12.8 oz (97 kg)   12/08/22 209 lb (94.8 kg)         ASSESSMENT/PLAN:     Patient is tolerating treatments well with expected toxicities. RBA were reviewed prior to first fraction and PRN. Current and planned dose reviewed. Goals of treatment and potential side effects were reviewed with the patient PRN. Treatment imaging has been personally reviewed for accuracy and precision. Questions answered to apparent satisfaction. Treatments will continue as planned. Doroteo Forbes.  Jahaira Pereira MD MS DABR  Radiation Oncologist        [facility] ([address] [place] [address]): [phone] /// FAX: [phone]  [facility]): [phone] /// FAX: [phone]  [facility] [phone] /// FAX: [phone]

## 2023-01-05 ENCOUNTER — HOSPITAL ENCOUNTER (OUTPATIENT)
Dept: RADIATION ONCOLOGY | Age: 68
Discharge: HOME | End: 2023-01-05
Payer: MEDICARE

## 2023-01-05 PROCEDURE — 77014 CHG CT GUIDANCE RADIATION THERAPY FLDS PLACEMENT: CPT | Performed by: SPECIALIST

## 2023-01-05 PROCEDURE — 77386 HC NTSTY MODUL RAD TX DLVR CPLX: CPT | Performed by: SPECIALIST

## 2023-01-06 ENCOUNTER — HOSPITAL ENCOUNTER (OUTPATIENT)
Dept: RADIATION ONCOLOGY | Age: 68
Discharge: HOME | End: 2023-01-06
Payer: MEDICARE

## 2023-01-06 PROCEDURE — 77386 HC NTSTY MODUL RAD TX DLVR CPLX: CPT | Performed by: SPECIALIST

## 2023-01-09 ENCOUNTER — HOSPITAL ENCOUNTER (OUTPATIENT)
Dept: RADIATION ONCOLOGY | Age: 68
Discharge: HOME | End: 2023-01-09
Payer: MEDICARE

## 2023-01-09 PROCEDURE — 77336 RADIATION PHYSICS CONSULT: CPT | Performed by: SPECIALIST

## 2023-01-09 PROCEDURE — 77386 HC NTSTY MODUL RAD TX DLVR CPLX: CPT | Performed by: SPECIALIST

## 2023-01-10 ENCOUNTER — HOSPITAL ENCOUNTER (OUTPATIENT)
Dept: RADIATION ONCOLOGY | Age: 68
Discharge: HOME | End: 2023-01-10
Payer: MEDICARE

## 2023-01-10 PROCEDURE — 77386 HC NTSTY MODUL RAD TX DLVR CPLX: CPT | Performed by: SPECIALIST

## 2023-01-10 PROCEDURE — 77014 CHG CT GUIDANCE RADIATION THERAPY FLDS PLACEMENT: CPT | Performed by: SPECIALIST

## 2023-01-11 ENCOUNTER — HOSPITAL ENCOUNTER (OUTPATIENT)
Dept: RADIATION ONCOLOGY | Age: 68
Discharge: HOME | End: 2023-01-11
Payer: MEDICARE

## 2023-01-11 PROCEDURE — 77014 CHG CT GUIDANCE RADIATION THERAPY FLDS PLACEMENT: CPT | Performed by: SPECIALIST

## 2023-01-11 PROCEDURE — 77386 HC NTSTY MODUL RAD TX DLVR CPLX: CPT | Performed by: SPECIALIST

## 2023-01-12 ENCOUNTER — HOSPITAL ENCOUNTER (OUTPATIENT)
Dept: RADIATION ONCOLOGY | Age: 68
Discharge: HOME | End: 2023-01-12
Payer: MEDICARE

## 2023-01-12 ENCOUNTER — TELEPHONE (OUTPATIENT)
Dept: RADIATION ONCOLOGY | Age: 68
End: 2023-01-12

## 2023-01-12 VITALS
RESPIRATION RATE: 18 BRPM | HEART RATE: 73 BPM | SYSTOLIC BLOOD PRESSURE: 147 MMHG | BODY MASS INDEX: 30.82 KG/M2 | DIASTOLIC BLOOD PRESSURE: 87 MMHG | TEMPERATURE: 97.2 F | OXYGEN SATURATION: 97 % | WEIGHT: 214.8 LBS

## 2023-01-12 DIAGNOSIS — C76.0 HEAD AND NECK CANCER (HCC): Primary | ICD-10-CM

## 2023-01-12 NOTE — PROGRESS NOTES
Alex Aus  2023  Wt Readings from Last 3 Encounters:   23 214 lb 12.8 oz (97.4 kg)   23 215 lb 9.6 oz (97.8 kg)   12/15/22 213 lb 12.8 oz (97 kg)     Body mass index is 30.82 kg/m². Treatment Area:Left Lateral Tongue    Patient was seen today for weekly visit. Comfort Alteration  KPS:90%  Fatigue: None    Mucous Membrane Alteration  Mucositis Due to Radiation: No  Thrush: Yes  Voice Changes: No    Nutritional Alteration  Anorexia: No   Nausea: No   Vomiting: No     Skin Alteration   Sensation:good    Radiation Dermatitis:  no    Ventilation Alteration  Cough:No    Emotional  Coping: effective    Injury, potential bleeding or infection: no    Radioprotectant Tolerance  na            Lab Results   Component Value Date    WBC 8.0 2022     2022         BP (!) 147/87   Pulse 73   Temp 97.2 °F (36.2 °C) (Temporal)   Resp 18   Wt 214 lb 12.8 oz (97.4 kg)   SpO2 97%   BMI 30.82 kg/m²   BP within normal range? no   -if no, manually recheck in 5-10 min  NEW BP readin/81  BP within normal range? yes       Assessment/Plan:fx;1600/6200cGy completed and tolerating RT well.     Sandrita Ott RN

## 2023-01-12 NOTE — TELEPHONE ENCOUNTER
Pt provided with sandwich and juice, tolerating.    Rc Cervantes  1/12/2023  Wt Readings from Last 10 Encounters:   01/12/23 214 lb 12.8 oz (97.4 kg)   01/03/23 215 lb 9.6 oz (97.8 kg)   12/15/22 213 lb 12.8 oz (97 kg)   12/08/22 209 lb (94.8 kg)   12/06/22 209 lb 9.6 oz (95.1 kg)   11/30/22 209 lb 12.8 oz (95.2 kg)   11/29/22 208 lb (94.3 kg)   11/23/22 226 lb (102.5 kg)   11/18/22 226 lb 12.8 oz (102.9 kg)   11/08/22 220 lb (99.8 kg)     Ht Readings from Last 1 Encounters:   12/15/22 5' 10\" (1.778 m)     BMI=30.82    Assessment: Met with Harmony Contreras while in for OTV with Dr. Augustus Lindo. Received 8/31 rad tx today for oral cavity cancer. Weight stable past week. Harmony Contreras reports he has reduced Two Jayjay HN to 3 cartons per day, and continues to utilize 30ml Prosource BID. Current EN providing 1625kcals, 80gm protein, 498ml free water from formula. He reports eating very soft foods cut into small pieces. Avoiding meats. Harmony Contreras said he has been discharged from dysphagia therapy but unsure what consistency he is allowed. Phone call placed to [facility], [address] [place] [address]. [facility] reports he is appropriate for minced and moist very easy to chew soft foods, and thin liquids and that it was in To's SLP discharge packet. [facility] also reported further upgrade will be directed by Dr. Cristofer Marcos. Phone call placed to Harmony Contreras and reported this information to him. Will also leave information for Minced and Moist diet with  for him to  while in for radiation appointment tomorrow. He voiced appreciation for helping investigate for him. Weight change: Weight stable past week and past month. 6.07% weight loss x 3 months  Appetite: Good appetite, reports eating spaghetti and sauce and chopped it up into small bites. Utilizing 3 cartons of TwoCal HN and 30ml Prosource BID via PEG tube. Nutritional Side Effects: None at this time  Calculated Needs: 28-30kcal/kg/CXA=1863-5635mtsgh, 1.3-1.5gm/kg/VKP=920-563wm protein, 1ml/kcal=2700-2900ml fluids  Malnutrition Status:  At risk  Nutrition Diagnosis: At risk r/t SCC base of tongue. Recommendations: Continue Two Jayjay HN 3-4 cartons per day (1 per feeding) with 360ml water flush with each carton of Two Jayjay HN and Prosource 30ml twice per day, water flush 360ml water with each carton of TwoCal HN. Oral diet of Minced and Moist diet as directed by SLP and Dr. Fernando Castro.      Leanne Clark RD

## 2023-01-13 ENCOUNTER — HOSPITAL ENCOUNTER (OUTPATIENT)
Dept: RADIATION ONCOLOGY | Age: 68
Discharge: HOME | End: 2023-01-13
Payer: MEDICARE

## 2023-01-13 PROCEDURE — 77386 HC NTSTY MODUL RAD TX DLVR CPLX: CPT | Performed by: SPECIALIST

## 2023-01-13 PROCEDURE — 77427 RADIATION TX MANAGEMENT X5: CPT | Performed by: RADIOLOGY

## 2023-01-13 NOTE — PATIENT INSTRUCTIONS
Continue daily fractionated radiation therapy as scheduled. Please see weekly OTV note and intial consultation letter in [facility] for clinical details. Courtney Eddy. Allen Malcolm MD MS [address]:  [phone]   FAX: [phone] [address] [facility] [address]:  [phone]   FAX:    [phone]  [address] [place] [address] Road:  [phone]   FAX:  [phone]  Email: [email or web address]

## 2023-01-13 NOTE — PROGRESS NOTES
DEPARTMENT OF RADIATION ONCOLOGY ON TREATMENT VISIT         1/13/2023      NAME:  Jama Last OF BIRTH:  1955    Diagnosis: HN CA    SUBJECTIVE:   Ludy Osei has now received fractionated external beam radiation therapy - ongoing. Past medical, surgical, social and family histories reviewed and updated as indicated. Pain: controlled    ALLERGIES:  Patient has no known allergies. Current Outpatient Medications   Medication Sig Dispense Refill    hydrOXYzine (ATARAX) 10 MG/5ML syrup       melatonin 5 MG TBDP disintegrating tablet Take 2 tablets by mouth nightly 30 tablet 3    omeprazole (PRILOSEC) 40 MG delayed release capsule Take 1 capsule by mouth every morning (before breakfast) 90 capsule 1    Cyanocobalamin (VITAMIN B 12 PO) Take 1 tablet by mouth daily      metoprolol (LOPRESSOR) 100 MG tablet Take 50 mg by mouth 2 times daily      Polyvinyl Alcohol-Povidone (REFRESH OP) Apply 4 drops to eye in the morning and at bedtime Indications: bilateral eyes      tetrahydrozoline 0.05 % ophthalmic solution Place 4 drops into both eyes 2 times daily      acetaminophen (TYLENOL) 500 MG tablet Take 1,000 mg by mouth daily as needed for Pain      loratadine (CLARITIN) 10 MG tablet Take 10 mg by mouth daily PRN      Multiple Vitamins-Minerals (CENTRUM SILVER PO) Take 1 tablet by mouth daily      Ascorbic Acid (VITAMIN C) 1000 MG tablet Take 1,000 mg by mouth daily      zinc 50 MG TABS tablet Take 100 mg by mouth 2 times daily      Cholecalciferol (VITAMIN D3) 50 MCG (2000 UT) CAPS Take 1 capsule by mouth daily      vitamin B-6 (PYRIDOXINE) 100 MG tablet Take 100 mg by mouth daily      atorvastatin (LIPITOR) 10 MG tablet Take 10 mg by mouth at bedtime       No current facility-administered medications for this encounter. OBJECTIVE:  Alert and fully ambulatory. Pleasant and conversant.       Physical Examination: General appearance - alert, well appearing, and in no distress. Wt Readings from Last 3 Encounters:   01/12/23 214 lb 12.8 oz (97.4 kg)   01/03/23 215 lb 9.6 oz (97.8 kg)   12/15/22 213 lb 12.8 oz (97 kg)         ASSESSMENT/PLAN:     Patient is tolerating treatments well with expected toxicities. RBA were reviewed prior to first fraction and PRN. Current and planned dose reviewed. Goals of treatment and potential side effects were reviewed with the patient PRN. Treatment imaging has been personally reviewed for accuracy and precision. Questions answered to apparent satisfaction. Treatments will continue as planned. Oleg Pierce.  Paul Singh MD MS DABR  Radiation Oncologist        [facility] ([address] [place] [address]): [phone] /// FAX: [phone]  [facility]): [phone] /// FAX: [phone]  [address] [place] [address]): [phone] /// FAX: [phone]

## 2023-01-16 ENCOUNTER — HOSPITAL ENCOUNTER (OUTPATIENT)
Dept: RADIATION ONCOLOGY | Age: 68
Discharge: HOME | End: 2023-01-16
Payer: MEDICARE

## 2023-01-16 PROCEDURE — 77386 HC NTSTY MODUL RAD TX DLVR CPLX: CPT | Performed by: SPECIALIST

## 2023-01-16 PROCEDURE — 77336 RADIATION PHYSICS CONSULT: CPT | Performed by: SPECIALIST

## 2023-01-16 PROCEDURE — 77014 CHG CT GUIDANCE RADIATION THERAPY FLDS PLACEMENT: CPT | Performed by: SPECIALIST

## 2023-01-17 ENCOUNTER — TELEPHONE (OUTPATIENT)
Dept: RADIATION ONCOLOGY | Age: 68
End: 2023-01-17

## 2023-01-17 ENCOUNTER — HOSPITAL ENCOUNTER (OUTPATIENT)
Dept: RADIATION ONCOLOGY | Age: 68
Discharge: HOME | End: 2023-01-17
Payer: MEDICARE

## 2023-01-17 VITALS
TEMPERATURE: 97.3 F | OXYGEN SATURATION: 97 % | SYSTOLIC BLOOD PRESSURE: 147 MMHG | RESPIRATION RATE: 18 BRPM | WEIGHT: 215 LBS | DIASTOLIC BLOOD PRESSURE: 90 MMHG | BODY MASS INDEX: 30.85 KG/M2 | HEART RATE: 74 BPM

## 2023-01-17 DIAGNOSIS — C06.9 CANCER OF ORAL CAVITY (HCC): Primary | ICD-10-CM

## 2023-01-17 PROCEDURE — 77386 HC NTSTY MODUL RAD TX DLVR CPLX: CPT | Performed by: SPECIALIST

## 2023-01-17 PROCEDURE — 99999 PR OFFICE/OUTPT VISIT,PROCEDURE ONLY: CPT | Performed by: RADIOLOGY

## 2023-01-17 PROCEDURE — 77014 CHG CT GUIDANCE RADIATION THERAPY FLDS PLACEMENT: CPT | Performed by: SPECIALIST

## 2023-01-17 NOTE — PROGRESS NOTES
DEPARTMENT OF RADIATION ONCOLOGY ON TREATMENT VISIT         1/17/2023      NAME:  Edmund Forrest OF BIRTH:  1955    Diagnosis: HN CA    SUBJECTIVE:   Estrellita Cabrera has now received fractionated external beam radiation therapy - ongoing. Past medical, surgical, social and family histories reviewed and updated as indicated. Pain: controlled    ALLERGIES:  Patient has no known allergies. Current Outpatient Medications   Medication Sig Dispense Refill    Magic Mouthwash (MIRACLE MOUTHWASH) Swish and spit 5 mLs 4 times daily as needed for Irritation 1:1:1:1 diphen, nystatin, lidocaine, maalox 480 mL 3    melatonin 5 MG TBDP disintegrating tablet Take 2 tablets by mouth nightly 30 tablet 3    omeprazole (PRILOSEC) 40 MG delayed release capsule Take 1 capsule by mouth every morning (before breakfast) 90 capsule 1    Cyanocobalamin (VITAMIN B 12 PO) Take 1 tablet by mouth daily      metoprolol (LOPRESSOR) 100 MG tablet Take 50 mg by mouth 2 times daily      Polyvinyl Alcohol-Povidone (REFRESH OP) Apply 4 drops to eye in the morning and at bedtime Indications: bilateral eyes      tetrahydrozoline 0.05 % ophthalmic solution Place 4 drops into both eyes 2 times daily      acetaminophen (TYLENOL) 500 MG tablet Take 1,000 mg by mouth daily as needed for Pain      loratadine (CLARITIN) 10 MG tablet Take 10 mg by mouth daily PRN      Multiple Vitamins-Minerals (CENTRUM SILVER PO) Take 1 tablet by mouth daily      Ascorbic Acid (VITAMIN C) 1000 MG tablet Take 1,000 mg by mouth daily      zinc 50 MG TABS tablet Take 100 mg by mouth 2 times daily      Cholecalciferol (VITAMIN D3) 50 MCG (2000 UT) CAPS Take 1 capsule by mouth daily      vitamin B-6 (PYRIDOXINE) 100 MG tablet Take 100 mg by mouth daily      atorvastatin (LIPITOR) 10 MG tablet Take 10 mg by mouth at bedtime       No current facility-administered medications for this encounter. OBJECTIVE:  Alert and fully ambulatory. Pleasant and conversant.      Physical Examination: General appearance - alert, well appearing, and in no distress.          Wt Readings from Last 3 Encounters:   01/17/23 215 lb (97.5 kg)   01/12/23 214 lb 12.8 oz (97.4 kg)   01/03/23 215 lb 9.6 oz (97.8 kg)         ASSESSMENT/PLAN:     Patient is tolerating treatments well with expected toxicities. RBA were reviewed prior to first fraction and PRN.    Current and planned dose reviewed. Goals of treatment and potential side effects were reviewed with the patient PRN. Treatment imaging has been personally reviewed for accuracy and precision.    Questions answered to apparent satisfaction.    Treatments will continue as planned.        Jl Camacho III, MD MS DABR  Radiation Oncologist        [facility] ([facility]): [phone] /// FAX: [phone]  [facility]ENEE SalterThayer): [phone] /// FAX: [phone]  SHANE Tracyen): [phone] /// FAX: [phone]

## 2023-01-17 NOTE — PATIENT INSTRUCTIONS
Continue daily fractionated radiation therapy as scheduled. Please see weekly OTV note and intial consultation letter in [facility] for clinical details. Jossie Brothers. Basim Chauhan MD MS [facility]:  [phone]   FAX: [phone]  [facility]:  [phone]   FAX:    [phone]  [facility]:  [phone]   FAX:  [phone]  Email: [email or web address]

## 2023-01-17 NOTE — PROGRESS NOTES
Selena Tony  2023  Wt Readings from Last 3 Encounters:   23 215 lb (97.5 kg)   23 214 lb 12.8 oz (97.4 kg)   23 215 lb 9.6 oz (97.8 kg)     Body mass index is 30.85 kg/m². Treatment Area:Left Lateral Tongue    Patient was seen today for weekly visit. Comfort Alteration  KPS:90%  Fatigue: Mild    Mucous Membrane Alteration  Mucositis Due to Radiation: Yes  Thrush: Yes  Voice Changes: No    Nutritional Alteration  Anorexia: No   Nausea: No   Vomiting: No     Skin Alteration   Sensation:reddened    Radiation Dermatitis:  no    Ventilation Alteration  Cough:No    Emotional  Coping: effective    Injury, potential bleeding or infection: no    Radioprotectant Tolerance  na            Lab Results   Component Value Date    WBC 8.0 2022     2022         BP (!) 147/90   Pulse 74   Temp 97.3 °F (36.3 °C) (Temporal)   Resp 18   Wt 215 lb (97.5 kg)   SpO2 97%   BMI 30.85 kg/m²   BP within normal range? no   -if no, manually recheck in 5-10 min  NEW BP readin/76  BP within normal range? yes       Assessment/Plan:fx;2200/6200cGy completed and tolerating.     Sagrario Bryan RN

## 2023-01-17 NOTE — TELEPHONE ENCOUNTER
Selena Jimenez  1/17/2023  Wt Readings from Last 10 Encounters:   01/17/23 215 lb (97.5 kg)   01/12/23 214 lb 12.8 oz (97.4 kg)   01/03/23 215 lb 9.6 oz (97.8 kg)   12/15/22 213 lb 12.8 oz (97 kg)   12/08/22 209 lb (94.8 kg)   12/06/22 209 lb 9.6 oz (95.1 kg)   11/30/22 209 lb 12.8 oz (95.2 kg)   11/29/22 208 lb (94.3 kg)   11/23/22 226 lb (102.5 kg)   11/18/22 226 lb 12.8 oz (102.9 kg)     Ht Readings from Last 1 Encounters:   12/15/22 5' 10\" (1.778 m)     BMI=30.85    Assessment: Visited with Issa Guerrero while in for OTV with Dr. Gilmar Avalos. He has completed 11/31 radiation treatments for tongue cancer. He reports still using Two Jayjay HN 3 cartons per day but feels he could eat more orally but too full due to tube feeding. Suggested reducing to 2 cartons per day and try to increase oral intake. He said he received information previously left for him regarding minced and moist diet consistency and said it has been helpful. He still is pureeing some foods to make easier to eat. He also drinks an Ensure orally daily. No complains of taste changes. He did complain of mucositis on tongue and cheek, doctor ordered [address] Encouraged Issa Guerrero to increase TF back to 3 cartons if his oral intake decreases. He voiced understanding. Encouraged him to call with questions or concerns. Weight change: Weight stable past month, 5.7% weight loss x 3 months  Appetite: Good appetite but feels full due to EN. Currently EN of Two Jayjay HN 3 cartons per day    Recommendations: Reduce tube feeding to Two Jayjay HN 2 cartons per day with 360ml water flush with each carton. Continue 30ml Prosource 2 times per day. Increase oral intake, continue Minced and Moist diet. Continue 1 Ensure orally per day as you have been drinking.      James Maza, NICANOR

## 2023-01-18 ENCOUNTER — HOSPITAL ENCOUNTER (OUTPATIENT)
Dept: RADIATION ONCOLOGY | Age: 68
Discharge: HOME | End: 2023-01-18
Payer: MEDICARE

## 2023-01-18 PROCEDURE — 77014 CHG CT GUIDANCE RADIATION THERAPY FLDS PLACEMENT: CPT | Performed by: SPECIALIST

## 2023-01-18 PROCEDURE — 77386 HC NTSTY MODUL RAD TX DLVR CPLX: CPT | Performed by: SPECIALIST

## 2023-01-19 ENCOUNTER — HOSPITAL ENCOUNTER (OUTPATIENT)
Dept: RADIATION ONCOLOGY | Age: 68
Discharge: HOME | End: 2023-01-19
Payer: MEDICARE

## 2023-01-19 PROCEDURE — 77386 HC NTSTY MODUL RAD TX DLVR CPLX: CPT | Performed by: SPECIALIST

## 2023-01-19 PROCEDURE — 77014 CHG CT GUIDANCE RADIATION THERAPY FLDS PLACEMENT: CPT | Performed by: SPECIALIST

## 2023-01-20 ENCOUNTER — HOSPITAL ENCOUNTER (OUTPATIENT)
Dept: RADIATION ONCOLOGY | Age: 68
Discharge: HOME | End: 2023-01-20
Payer: MEDICARE

## 2023-01-20 PROCEDURE — 77386 HC NTSTY MODUL RAD TX DLVR CPLX: CPT | Performed by: SPECIALIST

## 2023-01-23 ENCOUNTER — HOSPITAL ENCOUNTER (OUTPATIENT)
Dept: RADIATION ONCOLOGY | Age: 68
Discharge: HOME | End: 2023-01-23
Payer: MEDICARE

## 2023-01-23 PROCEDURE — 77336 RADIATION PHYSICS CONSULT: CPT | Performed by: SPECIALIST

## 2023-01-23 PROCEDURE — 77427 RADIATION TX MANAGEMENT X5: CPT | Performed by: SPECIALIST

## 2023-01-23 PROCEDURE — 77014 CHG CT GUIDANCE RADIATION THERAPY FLDS PLACEMENT: CPT | Performed by: SPECIALIST

## 2023-01-23 PROCEDURE — 77386 HC NTSTY MODUL RAD TX DLVR CPLX: CPT | Performed by: SPECIALIST

## 2023-01-24 ENCOUNTER — HOSPITAL ENCOUNTER (OUTPATIENT)
Dept: RADIATION ONCOLOGY | Age: 68
Discharge: HOME | End: 2023-01-24
Payer: MEDICARE

## 2023-01-24 VITALS
DIASTOLIC BLOOD PRESSURE: 71 MMHG | SYSTOLIC BLOOD PRESSURE: 139 MMHG | OXYGEN SATURATION: 98 % | RESPIRATION RATE: 18 BRPM | WEIGHT: 215 LBS | BODY MASS INDEX: 30.85 KG/M2 | TEMPERATURE: 97.2 F | HEART RATE: 74 BPM

## 2023-01-24 DIAGNOSIS — C06.9 CANCER OF ORAL CAVITY (HCC): Primary | ICD-10-CM

## 2023-01-24 PROBLEM — Z93.0 TRACHEOSTOMY DEPENDENCE (HCC): Status: RESOLVED | Noted: 2022-11-17 | Resolved: 2023-01-24

## 2023-01-24 PROCEDURE — 77386 HC NTSTY MODUL RAD TX DLVR CPLX: CPT | Performed by: SPECIALIST

## 2023-01-24 PROCEDURE — 77014 CHG CT GUIDANCE RADIATION THERAPY FLDS PLACEMENT: CPT | Performed by: SPECIALIST

## 2023-01-24 NOTE — PROGRESS NOTES
Paula Brush  1/24/2023  Wt Readings from Last 3 Encounters:   01/17/23 215 lb (97.5 kg)   01/12/23 214 lb 12.8 oz (97.4 kg)   01/03/23 215 lb 9.6 oz (97.8 kg)     There is no height or weight on file to calculate BMI. Treatment Area: Left Lateral Tongue    Patient was seen today for weekly visit. Comfort Alteration  KPS:90%  Fatigue: Mild    Mucous Membrane Alteration  Mucositis Due to Radiation: Yes  Thrush: Yes  Voice Changes: No    Nutritional Alteration  Anorexia: No   Nausea: No   Vomiting: No     Skin Alteration   Sensation:reddened    Radiation Dermatitis:  no    Ventilation Alteration  Cough:Yes, very slight    Emotional  Coping: effective    Injury, potential bleeding or infection: no    Radioprotectant Tolerance  na            Lab Results   Component Value Date    WBC 8.0 11/21/2022     11/21/2022         There were no vitals taken for this visit. BP within normal range? yes       Assessment/Plan:16/31fx;3200/6200cGy completed.     Jyothi Peña RN

## 2023-01-24 NOTE — PROGRESS NOTES
David Saxena  1/24/2023  11:23 AM      No chief complaint on file. Wt Readings from Last 3 Encounters:   01/24/23 215 lb (97.5 kg)   01/17/23 215 lb (97.5 kg)   01/12/23 214 lb 12.8 oz (97.4 kg)       Comments: Dose 3200 cGy head and neck. Patient is doing well. He is maintaining his weight. He has a PEG tube. He has no complaint except mild ulceration in the lateral side of the tongue on the left side. He has no thrush. The skin over the treated area looks good. Patient is tolerating treatment well.           Plan: Radiation to continue as planned      Electronically signed by Leo Encarnacion MD on 1/24/23 at 11:23 AM EST

## 2023-01-25 ENCOUNTER — HOSPITAL ENCOUNTER (OUTPATIENT)
Dept: RADIATION ONCOLOGY | Age: 68
Discharge: HOME | End: 2023-01-25
Payer: MEDICARE

## 2023-01-25 DIAGNOSIS — C06.9 PRIMARY CANCER OF ORAL CAVITY (HCC): Primary | ICD-10-CM

## 2023-01-25 PROCEDURE — 77014 CHG CT GUIDANCE RADIATION THERAPY FLDS PLACEMENT: CPT | Performed by: SPECIALIST

## 2023-01-25 PROCEDURE — 77386 HC NTSTY MODUL RAD TX DLVR CPLX: CPT | Performed by: SPECIALIST

## 2023-01-26 ENCOUNTER — HOSPITAL ENCOUNTER (OUTPATIENT)
Dept: RADIATION ONCOLOGY | Age: 68
Discharge: HOME | End: 2023-01-26
Payer: MEDICARE

## 2023-01-26 PROCEDURE — 77386 HC NTSTY MODUL RAD TX DLVR CPLX: CPT | Performed by: SPECIALIST

## 2023-01-26 PROCEDURE — 77014 CHG CT GUIDANCE RADIATION THERAPY FLDS PLACEMENT: CPT | Performed by: SPECIALIST

## 2023-01-27 ENCOUNTER — HOSPITAL ENCOUNTER (OUTPATIENT)
Dept: RADIATION ONCOLOGY | Age: 68
Discharge: HOME | End: 2023-01-27
Payer: MEDICARE

## 2023-01-27 PROCEDURE — 77386 HC NTSTY MODUL RAD TX DLVR CPLX: CPT | Performed by: SPECIALIST

## 2023-01-27 PROCEDURE — 77014 CHG CT GUIDANCE RADIATION THERAPY FLDS PLACEMENT: CPT | Performed by: SPECIALIST

## 2023-01-30 ENCOUNTER — HOSPITAL ENCOUNTER (OUTPATIENT)
Dept: RADIATION ONCOLOGY | Age: 68
Discharge: HOME | End: 2023-01-30
Payer: MEDICARE

## 2023-01-30 PROCEDURE — 77427 RADIATION TX MANAGEMENT X5: CPT | Performed by: RADIOLOGY

## 2023-01-30 PROCEDURE — 77014 CHG CT GUIDANCE RADIATION THERAPY FLDS PLACEMENT: CPT | Performed by: SPECIALIST

## 2023-01-30 PROCEDURE — 77386 HC NTSTY MODUL RAD TX DLVR CPLX: CPT | Performed by: SPECIALIST

## 2023-01-30 PROCEDURE — 77336 RADIATION PHYSICS CONSULT: CPT | Performed by: SPECIALIST

## 2023-01-30 NOTE — PROGRESS NOTES
1/31/23: Pt here for 6 wk follow up tongue cancer, started rads. Last rad treatment is 2/16/23. Pain and sores in mouth, eating causes flare ups. Difficulty swallowing due to irritation from radiation and bulk of tongue. No change in voice. Drinks 4-5 bottles of water, no caffeine, and no alcohol. Weight is stable.

## 2023-01-31 ENCOUNTER — OFFICE VISIT (OUTPATIENT)
Dept: ENT CLINIC | Age: 68
End: 2023-01-31
Payer: MEDICARE

## 2023-01-31 ENCOUNTER — HOSPITAL ENCOUNTER (OUTPATIENT)
Dept: RADIATION ONCOLOGY | Age: 68
Discharge: HOME | End: 2023-01-31
Payer: MEDICARE

## 2023-01-31 ENCOUNTER — TELEPHONE (OUTPATIENT)
Dept: RADIATION ONCOLOGY | Age: 68
End: 2023-01-31

## 2023-01-31 VITALS
SYSTOLIC BLOOD PRESSURE: 123 MMHG | HEART RATE: 91 BPM | BODY MASS INDEX: 30.58 KG/M2 | DIASTOLIC BLOOD PRESSURE: 84 MMHG | HEIGHT: 70 IN | WEIGHT: 213.6 LBS

## 2023-01-31 DIAGNOSIS — C80.1 CANCER (HCC): Primary | ICD-10-CM

## 2023-01-31 DIAGNOSIS — R13.12 OROPHARYNGEAL DYSPHAGIA: ICD-10-CM

## 2023-01-31 DIAGNOSIS — R13.10 DYSPHAGIA, UNSPECIFIED TYPE: ICD-10-CM

## 2023-01-31 DIAGNOSIS — T66.XXXD RADIATION INJURY, SUBSEQUENT ENCOUNTER: Primary | ICD-10-CM

## 2023-01-31 DIAGNOSIS — R13.10 ODYNOPHAGIA: ICD-10-CM

## 2023-01-31 DIAGNOSIS — C06.9 ORAL CANCER (HCC): ICD-10-CM

## 2023-01-31 DIAGNOSIS — C10.9 OROPHARYNX CANCER (HCC): ICD-10-CM

## 2023-01-31 DIAGNOSIS — C06.9 CANCER OF ORAL CAVITY (HCC): ICD-10-CM

## 2023-01-31 DIAGNOSIS — C02.9 TONGUE CANCER (HCC): ICD-10-CM

## 2023-01-31 PROCEDURE — 1036F TOBACCO NON-USER: CPT | Performed by: OTOLARYNGOLOGY

## 2023-01-31 PROCEDURE — 77386 HC NTSTY MODUL RAD TX DLVR CPLX: CPT | Performed by: SPECIALIST

## 2023-01-31 PROCEDURE — 1123F ACP DISCUSS/DSCN MKR DOCD: CPT | Performed by: OTOLARYNGOLOGY

## 2023-01-31 PROCEDURE — 77014 CHG CT GUIDANCE RADIATION THERAPY FLDS PLACEMENT: CPT | Performed by: SPECIALIST

## 2023-01-31 PROCEDURE — G8484 FLU IMMUNIZE NO ADMIN: HCPCS | Performed by: OTOLARYNGOLOGY

## 2023-01-31 PROCEDURE — 99213 OFFICE O/P EST LOW 20 MIN: CPT | Performed by: OTOLARYNGOLOGY

## 2023-01-31 PROCEDURE — 3017F COLORECTAL CA SCREEN DOC REV: CPT | Performed by: OTOLARYNGOLOGY

## 2023-01-31 PROCEDURE — G8417 CALC BMI ABV UP PARAM F/U: HCPCS | Performed by: OTOLARYNGOLOGY

## 2023-01-31 PROCEDURE — G8427 DOCREV CUR MEDS BY ELIG CLIN: HCPCS | Performed by: OTOLARYNGOLOGY

## 2023-01-31 NOTE — TELEPHONE ENCOUNTER
Mandie Welsh  1/31/2023  Wt Readings from Last 10 Encounters:   01/31/23 213 lb 9.6 oz (96.9 kg)   01/24/23 215 lb (97.5 kg)   01/17/23 215 lb (97.5 kg)   01/12/23 214 lb 12.8 oz (97.4 kg)   01/03/23 215 lb 9.6 oz (97.8 kg)   12/15/22 213 lb 12.8 oz (97 kg)   12/08/22 209 lb (94.8 kg)   12/06/22 209 lb 9.6 oz (95.1 kg)   11/30/22 209 lb 12.8 oz (95.2 kg)   11/29/22 208 lb (94.3 kg)     Ht Readings from Last 1 Encounters:   01/31/23 5' 10\" (1.778 m)     BMI=30.65    Assessment: Met with Praful Burch and his wife Shoshana Wilhelm while in for 100 [employer]. Receiving radiation therapy to left lateral tongue. 21/31tx complete. Praful Burch reports increased difficulty eating due to mouth sores/pain. He reports he also has pain swallowing but is getting through that. He had reduced TwoCal HN to 2 cartons per day, but due to eating less he increased back to 3 cartons per day via PEG tube. Also utilizes Prosource 1x per day via PEG. Orally he drinks 1 Premier Protein per day. Praful Burch and Shoshana Wilhelm both voiced that they continue to push oral intake to maintain swallowing muscles, as they remembered what Dr. Elsa Spain told them. Discussed soft solids and pureed consistency foods that may be easier on his mouth. Shoshana Wilhelm and Praful Burch have a good understanding that EN can be adjusted according to his oral intake and weight. Current EN feedings provide 1525kcals, 70gm protein, 498ml free water from formula. Weight change: 1.4# weight loss past week, 1% weight loss x 1 month, 5.07% weight loss x 3 months, 6.72% weight loss x 6 months  Appetite: Good appetite, but eating less due to mouth pain. Nutritional Side Effects: Odynophagia, oral mucositis  Calculated Needs: 28-30kcal/kg/YMG=0813-9526iryga, 1.3-1.5gm/kg/IXT=243-467cf protein, 1ml/kcal=2700-2900ml fluids  Malnutrition Status: At risk    Recommendations:  Continue oral diet as tolerated, 1 Premier Protein per day. Enteral nutrition via PEG tube of TwoCalHN 1 carton 3 times per day.  1oz Prosource 1 time per day. 360ml water with each carton of TwoCalHN. May increase TwoCal HN via PEG to 4 cartons per day if oral intake decreases.      Ashwini Saenz, RD

## 2023-01-31 NOTE — PROGRESS NOTES
Dear Dr Jennie Damian MD No ref. provider found     We had the pleasure of seeing Katalina Castillo, 1955 here    on 1/31/2023  Please see below for review of care and plans. Chief complaint-     ICD-10-CM    1. Oral cancer ([facility] [address]  C06.9 TSH      2. Odynophagia  R13.10       3. Tongue cancer ([address]  C02.9 TSH      4. Oropharyngeal dysphagia  R13.12       5. Dysphagia, unspecified type  R13.10 TSH      6. Cancer of oral cavity (H[facility])  C06.9       7. Oropharynx cancer ([address]  C10.9       8. Radiation injury, subsequent encounter  T66. 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 Left lateral tongue. Noticed approximately 2-3 months ago. Non painful, non tender. Tolerating PO intake. Denies cervical lymphadenopathy. Denies weight loss. Denies appetite change. Denies difficulty swallowing or drinking. Has had previous biopsy of tongue lesion which came back benign. Non smoker. Recent biopsy- pos scca    10/20/22: Pt here for 1 wk p/o Panendo. States pain was improving fri and sat after Sx, then sun night started w/ a burning pain in throat and cheek. Has a white patch on cheek that is causing concern of infection. Also states he has increased foul odor from mouth. Some difficulty swallowing when pain flares up. No change in voice since recovering from Panendo. Drinks 3-4 glasses of water, green tea periodically, and no alcohol. Weight is stable. 12/8/22: Pt here for follow up. Still with nutrition via PEG tube. Weight stable. Trach eval    12/15/22: Pt here for 1 wk f/u after trach removal and tongue reduction. No pain. Had MBSS done and is able to start taking liquids PO. No difficulty swallowing liquids. No change in voice.  Drinks 4-5 bottles of water, no caffeine, and no alcohol. Weight is stable. Review of Systems- No drainage, discharge, or headache. Complete 10 system ROS completed and negative except as noted above. 1/31/23: Pt here for 6 wk follow up tongue cancer, started rads. Last rad treatment is 2/16/23. Pain and sores in mouth, eating causes flare ups. Difficulty swallowing due to irritation from radiation and bulk of tongue. No change in voice. Drinks 4-5 bottles of water, no caffeine, and no alcohol. Weight is stable. Wounds look well healing with some granulation on superior aspect of forearm stsg    Physical Examination-   Vital Signs-/84   Pulse 91   Ht 5' 10\" (1.778 m)   Wt 213 lb 9.6 oz (96.9 kg)   BMI 30.65 kg/m²     Ears- Tympanic membranes clear bilaterally. No middle ear effusion. No pre or post auricular tenderness. Nose- Nasal mucosa clear and dry. No significant septal deviation or inferior turbinate hypertrophy. Oral Cavity/Oropharynx- Floor of mouth and tongue are soft and nontender. No posterior pharyngeal erythema. + gag reflex left lateral tongue/floor of mouth ~2cm plaque like lesion with submucosal firmness. + healing biopsy sites intraorally. Pink flap is intact- incision intact with less biting of tongue, tongue and buccal mucosa with ulcerations   Neck- Soft and nontender. No masses, lesions, lymphadenopathy, or thyroid nodules appreciated. Incisions c/d/I well healed  LUE: STSG c/d/I with two 1cm areas of granulation tissue in the superolateral aspect   LLE:  STSG donor site c/d/I well healing   Cranial Nerve- Cranial nerves II to XII intact. Extraocular muscles intact. No gross motor visual deficits. No spontaneous nystagmus. Face- No facial skin tenderness to palpation. Heart- No cyanosis, regular  Lungs- No stridor, no intercostal accessory muscle use  General- The patient is in no acute distress.  A&O x3    Nasopharyngoscopy 12/15/22  Procedure note- after pt verbally consented, was sprayed nasally with 1:1 neosynephrine and xylocaine. Scope was passed and found nasal cavity with no lesion. nasopharynx clear, tonsil wnl without asymmetry, Tongue mobile with flap intact without dehisence and pink, vocal cords mobile bilaterally with full ab and ad duction. Hypopharynx clear, open and no masses. No scar at trach site, and able to see tube thru cords, secretions much less and controlled    Procedure note: Removal of excess flap tissue 12/8/22 due to redundancy of flap tissue and pt was biting this tissue and causing trauma to it and comprimising integrity of flap, flap revision was warranted of tongue flap. Area of was anesthetized with 3 cc lido with epinephrine 1:100,000 for hemostasis. The excess anterior tip of flap tissue was excised in an elipse and additional cuts made at ends to remove dog ears and edges were undermined and then tissue advanced in at both ends to close the defect in a fashion prevents it getting caught int he interocclusal line. Total of 4.5 x 3 cm - 13.5 cm sq. Hemostasis was maintained. Defect was repaired with 3-0 monocryl using horizontal mattress sutures and then with running interlocking. Oral cavity was suctioned. Patient tolerated the procedure well. Procedure note: Decannulation  Edmonia Colace was removed. Bandage placed with mastisol, tegaderm and silk tape. Medical Decision Making and Treatment Plan. Pt seen and examined, scope exam with grossly normal findings. FNA of tongue done in office today. Will schedule for panendoscopy. Follow up in 1 week for path results review. R/B/A of surgery discussed with pt. Pt understood, consent signed, and would like to proceed to surgery. No personal or family history of bleeding or adverse reaction to anesthesia. Suspect will  need upfront surgical therapy with recon and then adjuvant therapy based upon pathology  Present at tumor board- recommend upfront surgery with neck and path driven adjuvant therapy as indicated.   Pan consult- ordered  Panendo- results-  pos scca. Posterior base of tongue mapping near 19 was pos  Recommend partial glossectomy with neck dissection and forearm free flap for txment as mass is 4 cm and with margins will have sig tissue loss.  Long dw pt, wife and daughter on process of surgery. Recommend preop peg and trach day of- they udnerstood  Talked about rehab and fact that all children live in [place], offered ability to seek second opinion there if they wanted, also offered to have rehab there if he needed it.    13. R/B/A of surgery discussed with pt. Pt understood, consent signed, and would like to proceed to surgery. No personal or family history of bleeding or adverse reaction to anesthesia.   14. Trach dependence- removed after scope shoed clear airway and no ostruciton.- doing well, secretions less and controlling. Trach site intact with clear airway and no scar or grans.  15. Flap revised to prevent further truama.  16. Dw pt on swallowing and new bite plane after revision and also to get po in as speech gave hiim instrucitons on what and how to eat- reinforced. Continue speech therapy as needed for post surgery  17. Pt discussed at multidisciplinary board - recommend adjuvant xrt alone, no chemo. Dw pt and wife what this means and how he will heal  18. LUE STSG recipient site with granulation tissue, this was cauterized with silver nitrate 1/31/23, continue local wound care  19. Follow up in 1month for continued surveillance   20 continue speech therapy for voice and swallow issues.  21 xrt changes- continue txment and mouthwash and oitnment to skin for side effects.   22 tsh ordered      Thank you for the opportunity to take part in the care of this very pleasant patient, To Rosenthal  Sincerely,          Natanael Davis M.D., Ph.D., FACS  [facility]   Department of Otolaryngology-Head and Neck Surgery  Chief of Head & Neck Surgical Oncology  Director Head & Neck Oncology  Service [facility]

## 2023-02-01 ENCOUNTER — HOSPITAL ENCOUNTER (OUTPATIENT)
Dept: RADIATION ONCOLOGY | Age: 68
Discharge: HOME | End: 2023-02-01
Payer: MEDICARE

## 2023-02-01 PROCEDURE — 77386 HC NTSTY MODUL RAD TX DLVR CPLX: CPT | Performed by: SPECIALIST

## 2023-02-01 PROCEDURE — 77014 CHG CT GUIDANCE RADIATION THERAPY FLDS PLACEMENT: CPT | Performed by: SPECIALIST

## 2023-02-02 ENCOUNTER — HOSPITAL ENCOUNTER (OUTPATIENT)
Dept: RADIATION ONCOLOGY | Age: 68
Discharge: HOME | End: 2023-02-02
Payer: MEDICARE

## 2023-02-02 PROCEDURE — 77386 HC NTSTY MODUL RAD TX DLVR CPLX: CPT | Performed by: SPECIALIST

## 2023-02-02 PROCEDURE — 77014 CHG CT GUIDANCE RADIATION THERAPY FLDS PLACEMENT: CPT | Performed by: SPECIALIST

## 2023-02-03 ENCOUNTER — HOSPITAL ENCOUNTER (OUTPATIENT)
Dept: RADIATION ONCOLOGY | Age: 68
Discharge: HOME | End: 2023-02-03
Payer: MEDICARE

## 2023-02-03 PROCEDURE — 77386 HC NTSTY MODUL RAD TX DLVR CPLX: CPT | Performed by: SPECIALIST

## 2023-02-06 ENCOUNTER — HOSPITAL ENCOUNTER (OUTPATIENT)
Dept: RADIATION ONCOLOGY | Age: 68
Discharge: HOME | End: 2023-02-06
Payer: MEDICARE

## 2023-02-06 PROCEDURE — 77386 HC NTSTY MODUL RAD TX DLVR CPLX: CPT | Performed by: SPECIALIST

## 2023-02-06 PROCEDURE — 77014 CHG CT GUIDANCE RADIATION THERAPY FLDS PLACEMENT: CPT | Performed by: SPECIALIST

## 2023-02-06 PROCEDURE — 77336 RADIATION PHYSICS CONSULT: CPT | Performed by: SPECIALIST

## 2023-02-07 ENCOUNTER — HOSPITAL ENCOUNTER (OUTPATIENT)
Dept: RADIATION ONCOLOGY | Age: 68
Discharge: HOME | End: 2023-02-07
Payer: MEDICARE

## 2023-02-07 VITALS
TEMPERATURE: 97 F | DIASTOLIC BLOOD PRESSURE: 76 MMHG | OXYGEN SATURATION: 97 % | RESPIRATION RATE: 18 BRPM | BODY MASS INDEX: 30.53 KG/M2 | HEART RATE: 88 BPM | WEIGHT: 212.8 LBS | SYSTOLIC BLOOD PRESSURE: 132 MMHG

## 2023-02-07 DIAGNOSIS — C06.9 CANCER OF ORAL CAVITY (HCC): Primary | ICD-10-CM

## 2023-02-07 PROCEDURE — 77386 HC NTSTY MODUL RAD TX DLVR CPLX: CPT | Performed by: SPECIALIST

## 2023-02-07 PROCEDURE — 77014 CHG CT GUIDANCE RADIATION THERAPY FLDS PLACEMENT: CPT | Performed by: SPECIALIST

## 2023-02-07 NOTE — PROGRESS NOTES
Ryan Bhardwaj  2/7/2023  11:16 AM      No chief complaint on file. Wt Readings from Last 3 Encounters:   02/07/23 212 lb 12.8 oz (96.5 kg)   01/31/23 213 lb 9.6 oz (96.9 kg)   01/24/23 215 lb (97.5 kg)       Comments: 5200 cGy to the head and neck. Patient is doing well. He is using a PEG tube and his weight is stable. On examination the skin over the  is red. Ulceration on the right lateral edge of his tongue seen. The skin over the treated area is red. He complains of dryness of mouth and change in the taste of food. Patient has 5 more treatments to go.     He is tolerating treatment well      Plan: Radiation to continue as planned      Electronically signed by Mima Emanuel MD on 2/7/23 at 11:16 AM EST

## 2023-02-07 NOTE — PROGRESS NOTES
Michaela [place]  2/7/2023  Wt Readings from Last 3 Encounters:   02/07/23 212 lb 12.8 oz (96.5 kg)   01/31/23 213 lb 9.6 oz (96.9 kg)   01/24/23 215 lb (97.5 kg)     Body mass index is 30.53 kg/m². Treatment Area:Left Lateral Tongue    Patient was seen today for weekly visit. Comfort Alteration  KPS:90%  Fatigue: Mild    Mucous Membrane Alteration  Mucositis Due to Radiation: Yes  Thrush: Yes  Voice Changes: No    Nutritional Alteration  Anorexia: Yes   Nausea: No   Vomiting: No     Skin Alteration   Sensation:reddened    Radiation Dermatitis:  yes    Ventilation Alteration  Cough:Yes    Emotional  Coping: effective    Injury, potential bleeding or infection: no    Radioprotectant Tolerance  na            Lab Results   Component Value Date    WBC 8.0 11/21/2022     11/21/2022         /76   Pulse 88   Temp 97 °F (36.1 °C) (Temporal)   Resp 18   Wt 212 lb 12.8 oz (96.5 kg)   SpO2 97%   BMI 30.53 kg/m²   BP within normal range? yes       Assessment/Plan:26/31fx;5200/6200cGy completed and tolerating RT well.     Dl Elizondo RN

## 2023-02-08 ENCOUNTER — TELEPHONE (OUTPATIENT)
Dept: RADIATION ONCOLOGY | Age: 68
End: 2023-02-08

## 2023-02-08 ENCOUNTER — HOSPITAL ENCOUNTER (OUTPATIENT)
Dept: RADIATION ONCOLOGY | Age: 68
Discharge: HOME | End: 2023-02-08
Payer: MEDICARE

## 2023-02-08 PROCEDURE — 77386 HC NTSTY MODUL RAD TX DLVR CPLX: CPT | Performed by: SPECIALIST

## 2023-02-08 NOTE — TELEPHONE ENCOUNTER
Late entry from 2/7/23  Ariana [facility]  2/8/2023  Wt Readings from Last 10 Encounters:   02/07/23 212 lb 12.8 oz (96.5 kg)   01/31/23 213 lb 9.6 oz (96.9 kg)   01/24/23 215 lb (97.5 kg)   01/17/23 215 lb (97.5 kg)   01/12/23 214 lb 12.8 oz (97.4 kg)   01/03/23 215 lb 9.6 oz (97.8 kg)   12/15/22 213 lb 12.8 oz (97 kg)   12/08/22 209 lb (94.8 kg)   12/06/22 209 lb 9.6 oz (95.1 kg)   11/30/22 209 lb 12.8 oz (95.2 kg)     Ht Readings from Last 1 Encounters:   01/31/23 5' 10\" (1.778 m)     BMI=30.53    Assessment: Briefly met with Marlys Ferreira while in for OTV . Marlys Ferreira reports his biggest complaint currently is oral mucositis. He has been using Magic Mouth Wash and also the water/baking soda/salt rinse. He feels as if the water/baking soda/salt rinse makes his mouth feel the best. He is still eating orally but finding some foods more difficult than others due to the sores. He is utilizing his PEG tube with TwoCal HN for 3 cartons per day and 1oz Prosource per day. He understands if unable to eat, he is to increase TwoCal HN to 4 cartons per day. Praise given for maintaining his weight. No further nutrition questions at this time.      Hernán Rivero RD

## 2023-02-09 ENCOUNTER — HOSPITAL ENCOUNTER (OUTPATIENT)
Dept: RADIATION ONCOLOGY | Age: 68
Discharge: HOME | End: 2023-02-09
Payer: MEDICARE

## 2023-02-09 PROCEDURE — 77386 HC NTSTY MODUL RAD TX DLVR CPLX: CPT | Performed by: SPECIALIST

## 2023-02-10 ENCOUNTER — HOSPITAL ENCOUNTER (OUTPATIENT)
Dept: RADIATION ONCOLOGY | Age: 68
Discharge: HOME | End: 2023-02-10
Payer: MEDICARE

## 2023-02-10 PROCEDURE — 77386 HC NTSTY MODUL RAD TX DLVR CPLX: CPT | Performed by: SPECIALIST

## 2023-02-13 ENCOUNTER — HOSPITAL ENCOUNTER (OUTPATIENT)
Dept: RADIATION ONCOLOGY | Age: 68
Discharge: HOME | End: 2023-02-13
Payer: MEDICARE

## 2023-02-13 PROCEDURE — 77386 HC NTSTY MODUL RAD TX DLVR CPLX: CPT | Performed by: SPECIALIST

## 2023-02-13 PROCEDURE — 77427 RADIATION TX MANAGEMENT X5: CPT | Performed by: RADIOLOGY

## 2023-02-13 PROCEDURE — 77336 RADIATION PHYSICS CONSULT: CPT | Performed by: SPECIALIST

## 2023-02-13 PROCEDURE — 77014 CHG CT GUIDANCE RADIATION THERAPY FLDS PLACEMENT: CPT | Performed by: SPECIALIST

## 2023-02-14 ENCOUNTER — HOSPITAL ENCOUNTER (OUTPATIENT)
Dept: RADIATION ONCOLOGY | Age: 68
Discharge: HOME | End: 2023-02-14
Payer: MEDICARE

## 2023-02-14 ENCOUNTER — APPOINTMENT (OUTPATIENT)
Dept: RADIATION ONCOLOGY | Age: 68
End: 2023-02-14
Payer: MEDICARE

## 2023-02-14 VITALS
HEART RATE: 87 BPM | RESPIRATION RATE: 18 BRPM | TEMPERATURE: 97.3 F | WEIGHT: 211 LBS | OXYGEN SATURATION: 97 % | BODY MASS INDEX: 30.28 KG/M2 | DIASTOLIC BLOOD PRESSURE: 88 MMHG | SYSTOLIC BLOOD PRESSURE: 126 MMHG

## 2023-02-14 DIAGNOSIS — C76.0 HEAD AND NECK CANCER (HCC): Primary | ICD-10-CM

## 2023-02-14 PROCEDURE — 77014 CHG CT GUIDANCE RADIATION THERAPY FLDS PLACEMENT: CPT | Performed by: SPECIALIST

## 2023-02-14 PROCEDURE — 99999 PR OFFICE/OUTPT VISIT,PROCEDURE ONLY: CPT | Performed by: RADIOLOGY

## 2023-02-14 PROCEDURE — 77386 HC NTSTY MODUL RAD TX DLVR CPLX: CPT | Performed by: SPECIALIST

## 2023-02-14 NOTE — PROGRESS NOTES
DEPARTMENT OF RADIATION ONCOLOGY ON TREATMENT VISIT         1/31/23      NAME:  Kathy Valencia OF BIRTH:  1955    Diagnosis: HN CA    SUBJECTIVE:   Jacquie Leone has now received fractionated external beam radiation therapy - ongoing. Past medical, surgical, social and family histories reviewed and updated as indicated. Pain: controlled    ALLERGIES:  Patient has no known allergies. Current Outpatient Medications   Medication Sig Dispense Refill    Magic Mouthwash (MIRACLE MOUTHWASH) Swish and spit 5 mLs 4 times daily as needed for Irritation 1:1:1:1 diphen, nystatin, lidocaine, maalox 480 mL 3    melatonin 5 MG TBDP disintegrating tablet Take 2 tablets by mouth nightly 30 tablet 3    omeprazole (PRILOSEC) 40 MG delayed release capsule Take 1 capsule by mouth every morning (before breakfast) 90 capsule 1    Cyanocobalamin (VITAMIN B 12 PO) Take 1 tablet by mouth daily      metoprolol (LOPRESSOR) 100 MG tablet Take 50 mg by mouth 2 times daily      Polyvinyl Alcohol-Povidone (REFRESH OP) Apply 4 drops to eye in the morning and at bedtime Indications: bilateral eyes      tetrahydrozoline 0.05 % ophthalmic solution Place 4 drops into both eyes 2 times daily      acetaminophen (TYLENOL) 500 MG tablet Take 1,000 mg by mouth daily as needed for Pain      loratadine (CLARITIN) 10 MG tablet Take 10 mg by mouth daily PRN      Multiple Vitamins-Minerals (CENTRUM SILVER PO) Take 1 tablet by mouth daily      Ascorbic Acid (VITAMIN C) 1000 MG tablet Take 1,000 mg by mouth daily      zinc 50 MG TABS tablet Take 100 mg by mouth 2 times daily      Cholecalciferol (VITAMIN D3) 50 MCG (2000 UT) CAPS Take 1 capsule by mouth daily      vitamin B-6 (PYRIDOXINE) 100 MG tablet Take 100 mg by mouth daily      atorvastatin (LIPITOR) 10 MG tablet Take 10 mg by mouth at bedtime       No current facility-administered medications for this encounter. OBJECTIVE:  Alert and fully ambulatory. Pleasant and conversant. Physical Examination: General appearance - alert, well appearing, and in no distress. Wt Readings from Last 3 Encounters:   02/07/23 212 lb 12.8 oz (96.5 kg)   01/31/23 213 lb 9.6 oz (96.9 kg)   01/24/23 215 lb (97.5 kg)         ASSESSMENT/PLAN:     Patient is tolerating treatments well with expected toxicities. RBA were reviewed prior to first fraction and PRN. Current and planned dose reviewed. Goals of treatment and potential side effects were reviewed with the patient PRN. Treatment imaging has been personally reviewed for accuracy and precision. Questions answered to apparent satisfaction. Treatments will continue as planned. Marcelyn Mohs.  Nicole Barros MD MS T[place]  Radiation Oncologist        [facility] ([address] [place] [address]): [phone] /// FAX: [phone]  [facility]): [phone] /// FAX: [phone]  [address] [place] [address] [facility] Splinter): [phone] /// FAX: [phone]

## 2023-02-14 NOTE — PATIENT INSTRUCTIONS
Continue daily fractionated radiation therapy as scheduled. Please see weekly OTV note and intial consultation letter in [facility] for clinical details. Paula Hewitt. Bee Tse MD MS Ari Chavezhard:  [phone]   FAX: [phone]  [address] [place] [address]:  [phone]   FAX:    [phone]  [address] [place] [address] Road:  [phone]   FAX:  [phone]  Email: [email or web address]

## 2023-02-14 NOTE — PROGRESS NOTES
Matias Walters  2/14/2023  8:27 AM          Current Outpatient Medications   Medication Sig Dispense Refill    Magic Mouthwash (MIRACLE MOUTHWASH) Swish and spit 5 mLs 4 times daily as needed for Irritation 1:1:1:1 diphen, nystatin, lidocaine, maalox 480 mL 3    melatonin 5 MG TBDP disintegrating tablet Take 2 tablets by mouth nightly 30 tablet 3    omeprazole (PRILOSEC) 40 MG delayed release capsule Take 1 capsule by mouth every morning (before breakfast) 90 capsule 1    Cyanocobalamin (VITAMIN B 12 PO) Take 1 tablet by mouth daily      metoprolol (LOPRESSOR) 100 MG tablet Take 50 mg by mouth 2 times daily      Polyvinyl Alcohol-Povidone (REFRESH OP) Apply 4 drops to eye in the morning and at bedtime Indications: bilateral eyes      tetrahydrozoline 0.05 % ophthalmic solution Place 4 drops into both eyes 2 times daily      acetaminophen (TYLENOL) 500 MG tablet Take 1,000 mg by mouth daily as needed for Pain      loratadine (CLARITIN) 10 MG tablet Take 10 mg by mouth daily PRN      Multiple Vitamins-Minerals (CENTRUM SILVER PO) Take 1 tablet by mouth daily      Ascorbic Acid (VITAMIN C) 1000 MG tablet Take 1,000 mg by mouth daily      zinc 50 MG TABS tablet Take 100 mg by mouth 2 times daily      Cholecalciferol (VITAMIN D3) 50 MCG (2000 UT) CAPS Take 1 capsule by mouth daily      vitamin B-6 (PYRIDOXINE) 100 MG tablet Take 100 mg by mouth daily      atorvastatin (LIPITOR) 10 MG tablet Take 10 mg by mouth at bedtime       No current facility-administered medications for this encounter. This is an up-to-date medication list.    Please take this list to your next care provider, and discard any previous medication lists.

## 2023-02-14 NOTE — PROGRESS NOTES
Jonny Isma  2/14/2023  Wt Readings from Last 3 Encounters:   02/14/23 211 lb (95.7 kg)   02/07/23 212 lb 12.8 oz (96.5 kg)   01/31/23 213 lb 9.6 oz (96.9 kg)     Body mass index is 30.28 kg/m². Treatment Area:Left Lateral Tongue    Patient was seen today for weekly visit. Comfort Alteration  KPS:90%  Fatigue: Mild    Mucous Membrane Alteration  Mucositis Due to Radiation: Yes  Thrush: Yes  Voice Changes: No    Nutritional Alteration  Anorexia: Yes   Nausea: No   Vomiting: No     Skin Alteration   Sensation:red    Radiation Dermatitis:  no    Ventilation Alteration  Cough:Yes    Emotional  Coping: effective    Injury, potential bleeding or infection: no    Radioprotectant Tolerance  na            Lab Results   Component Value Date    WBC 8.0 11/21/2022     11/21/2022         /88   Pulse 87   Temp 97.3 °F (36.3 °C) (Temporal)   Resp 18   Wt 211 lb (95.7 kg)   SpO2 97%   BMI 30.28 kg/m²   BP within normal range? yes       Assessment/Plan:31fx;6200cGy completed and tolerated RT well. Discharge instructions and follow up appointment given. Patient verbalized understanding of care and all questions answered.     Sanjuana Rowley RN

## 2023-02-14 NOTE — PATIENT INSTRUCTIONS
Continue daily fractionated radiation therapy as scheduled. Please see weekly OTV note and intial consultation letter in [facility] for clinical details. Felix Delcid. Albania Benoit MD MS Carito Yin:  [phone]   FAX: [phone]  [facility]:  [phone]   FAX:    [phone]  [address] [place] [address] Road:  [phone]   FAX:  [phone]  Email: [email or web address]

## 2023-02-14 NOTE — PROGRESS NOTES
DEPARTMENT OF RADIATION ONCOLOGY ON TREATMENT VISIT         2/14/2023      NAME:  Kathy Valencia OF BIRTH:  1955    Diagnosis: HN CA    SUBJECTIVE:   Jacquie Leone has now received fractionated external beam radiation therapy - ongoing. Past medical, surgical, social and family histories reviewed and updated as indicated. Pain: controlled    ALLERGIES:  Patient has no known allergies. Current Outpatient Medications   Medication Sig Dispense Refill    Magic Mouthwash (MIRACLE MOUTHWASH) Swish and spit 5 mLs 4 times daily as needed for Irritation 1:1:1:1 diphen, nystatin, lidocaine, maalox 480 mL 3    melatonin 5 MG TBDP disintegrating tablet Take 2 tablets by mouth nightly 30 tablet 3    omeprazole (PRILOSEC) 40 MG delayed release capsule Take 1 capsule by mouth every morning (before breakfast) 90 capsule 1    Cyanocobalamin (VITAMIN B 12 PO) Take 1 tablet by mouth daily      metoprolol (LOPRESSOR) 100 MG tablet Take 50 mg by mouth 2 times daily      Polyvinyl Alcohol-Povidone (REFRESH OP) Apply 4 drops to eye in the morning and at bedtime Indications: bilateral eyes      tetrahydrozoline 0.05 % ophthalmic solution Place 4 drops into both eyes 2 times daily      acetaminophen (TYLENOL) 500 MG tablet Take 1,000 mg by mouth daily as needed for Pain      loratadine (CLARITIN) 10 MG tablet Take 10 mg by mouth daily PRN      Multiple Vitamins-Minerals (CENTRUM SILVER PO) Take 1 tablet by mouth daily      Ascorbic Acid (VITAMIN C) 1000 MG tablet Take 1,000 mg by mouth daily      zinc 50 MG TABS tablet Take 100 mg by mouth 2 times daily      Cholecalciferol (VITAMIN D3) 50 MCG (2000 UT) CAPS Take 1 capsule by mouth daily      vitamin B-6 (PYRIDOXINE) 100 MG tablet Take 100 mg by mouth daily      atorvastatin (LIPITOR) 10 MG tablet Take 10 mg by mouth at bedtime       No current facility-administered medications for this encounter. OBJECTIVE:  Alert and fully ambulatory. Pleasant and conversant. Physical Examination: General appearance - alert, well appearing, and in no distress. Wt Readings from Last 3 Encounters:   02/14/23 211 lb (95.7 kg)   02/07/23 212 lb 12.8 oz (96.5 kg)   01/31/23 213 lb 9.6 oz (96.9 kg)         ASSESSMENT/PLAN:     Patient is tolerating treatments well with expected toxicities. RBA were reviewed prior to first fraction and PRN. Current and planned dose reviewed. Goals of treatment and potential side effects were reviewed with the patient PRN. Treatment imaging has been personally reviewed for accuracy and precision. Questions answered to apparent satisfaction. Treatments will continue as planned. Jarod Braxton.  Colette Harris MD MS T[place]YR  Radiation Oncologist        [facility] ([address] [place] [address]): [phone] /// FAX: [phone]  [facility]): [phone] /// FAX: [phone]  [address] [place] [address]): [phone] /// FAX: [phone]

## 2023-02-15 ENCOUNTER — APPOINTMENT (OUTPATIENT)
Dept: RADIATION ONCOLOGY | Age: 68
End: 2023-02-15
Payer: MEDICARE

## 2023-02-16 ENCOUNTER — APPOINTMENT (OUTPATIENT)
Dept: RADIATION ONCOLOGY | Age: 68
End: 2023-02-16
Payer: MEDICARE

## 2023-02-22 ENCOUNTER — HOSPITAL ENCOUNTER (OUTPATIENT)
Dept: INFUSION THERAPY | Age: 68
Discharge: HOME | End: 2023-02-22
Payer: MEDICARE

## 2023-02-22 ENCOUNTER — OFFICE VISIT (OUTPATIENT)
Dept: ONCOLOGY | Age: 68
End: 2023-02-22
Payer: MEDICARE

## 2023-02-22 VITALS
WEIGHT: 208.8 LBS | DIASTOLIC BLOOD PRESSURE: 81 MMHG | TEMPERATURE: 96.9 F | HEIGHT: 70 IN | BODY MASS INDEX: 29.89 KG/M2 | SYSTOLIC BLOOD PRESSURE: 133 MMHG | HEART RATE: 74 BPM | OXYGEN SATURATION: 98 %

## 2023-02-22 DIAGNOSIS — C06.9 PRIMARY CANCER OF ORAL CAVITY (HCC): ICD-10-CM

## 2023-02-22 DIAGNOSIS — E89.0 POSTABLATIVE HYPOTHYROIDISM: ICD-10-CM

## 2023-02-22 DIAGNOSIS — R13.10 DYSPHAGIA, UNSPECIFIED TYPE: ICD-10-CM

## 2023-02-22 DIAGNOSIS — C06.9 ORAL CANCER (HCC): ICD-10-CM

## 2023-02-22 DIAGNOSIS — C76.0 HEAD AND NECK CANCER (HCC): Primary | ICD-10-CM

## 2023-02-22 DIAGNOSIS — C02.9 TONGUE CANCER (HCC): ICD-10-CM

## 2023-02-22 LAB
ALBUMIN SERPL-MCNC: 4 G/DL (ref 3.5–5.2)
ALP BLD-CCNC: 95 U/L (ref 40–129)
ALT SERPL-CCNC: 38 U/L (ref 0–40)
ANION GAP SERPL CALCULATED.3IONS-SCNC: 11 MMOL/L (ref 7–16)
AST SERPL-CCNC: 35 U/L (ref 0–39)
BASOPHILS ABSOLUTE: 0.04 E9/L (ref 0–0.2)
BASOPHILS RELATIVE PERCENT: 0.6 % (ref 0–2)
BILIRUB SERPL-MCNC: 0.3 MG/DL (ref 0–1.2)
BUN BLDV-MCNC: 22 MG/DL (ref 6–23)
CALCIUM SERPL-MCNC: 9.8 MG/DL (ref 8.6–10.2)
CHLORIDE BLD-SCNC: 97 MMOL/L (ref 98–107)
CO2: 25 MMOL/L (ref 22–29)
CREAT SERPL-MCNC: 0.8 MG/DL (ref 0.7–1.2)
EOSINOPHILS ABSOLUTE: 0.01 E9/L (ref 0.05–0.5)
EOSINOPHILS RELATIVE PERCENT: 0.2 % (ref 0–6)
GFR SERPL CREATININE-BSD FRML MDRD: >60 ML/MIN/1.73
GLUCOSE BLD-MCNC: 133 MG/DL (ref 74–99)
HCT VFR BLD CALC: 45.7 % (ref 37–54)
HEMOGLOBIN: 14.7 G/DL (ref 12.5–16.5)
IMMATURE GRANULOCYTES #: 0.02 E9/L
IMMATURE GRANULOCYTES %: 0.3 % (ref 0–5)
LYMPHOCYTES ABSOLUTE: 0.32 E9/L (ref 1.5–4)
LYMPHOCYTES RELATIVE PERCENT: 5 % (ref 20–42)
MCH RBC QN AUTO: 27 PG (ref 26–35)
MCHC RBC AUTO-ENTMCNC: 32.2 % (ref 32–34.5)
MCV RBC AUTO: 84 FL (ref 80–99.9)
MONOCYTES ABSOLUTE: 0.49 E9/L (ref 0.1–0.95)
MONOCYTES RELATIVE PERCENT: 7.7 % (ref 2–12)
NEUTROPHILS ABSOLUTE: 5.5 E9/L (ref 1.8–7.3)
NEUTROPHILS RELATIVE PERCENT: 86.2 % (ref 43–80)
PDW BLD-RTO: 12.4 FL (ref 11.5–15)
PLATELET # BLD: 276 E9/L (ref 130–450)
PMV BLD AUTO: 10.7 FL (ref 7–12)
POTASSIUM SERPL-SCNC: 4.6 MMOL/L (ref 3.5–5)
RBC # BLD: 5.44 E12/L (ref 3.8–5.8)
SODIUM BLD-SCNC: 133 MMOL/L (ref 132–146)
TOTAL PROTEIN: 7.4 G/DL (ref 6.4–8.3)
TSH SERPL DL<=0.05 MIU/L-ACNC: 0.89 UIU/ML (ref 0.27–4.2)
WBC # BLD: 6.4 E9/L (ref 4.5–11.5)

## 2023-02-22 PROCEDURE — 36415 COLL VENOUS BLD VENIPUNCTURE: CPT

## 2023-02-22 PROCEDURE — 85025 COMPLETE CBC W/AUTO DIFF WBC: CPT

## 2023-02-22 PROCEDURE — G8484 FLU IMMUNIZE NO ADMIN: HCPCS | Performed by: STUDENT IN AN ORGANIZED HEALTH CARE EDUCATION/TRAINING PROGRAM

## 2023-02-22 PROCEDURE — 3017F COLORECTAL CA SCREEN DOC REV: CPT | Performed by: STUDENT IN AN ORGANIZED HEALTH CARE EDUCATION/TRAINING PROGRAM

## 2023-02-22 PROCEDURE — 1036F TOBACCO NON-USER: CPT | Performed by: STUDENT IN AN ORGANIZED HEALTH CARE EDUCATION/TRAINING PROGRAM

## 2023-02-22 PROCEDURE — 84443 ASSAY THYROID STIM HORMONE: CPT

## 2023-02-22 PROCEDURE — G8427 DOCREV CUR MEDS BY ELIG CLIN: HCPCS | Performed by: STUDENT IN AN ORGANIZED HEALTH CARE EDUCATION/TRAINING PROGRAM

## 2023-02-22 PROCEDURE — 99213 OFFICE O/P EST LOW 20 MIN: CPT

## 2023-02-22 PROCEDURE — 80053 COMPREHEN METABOLIC PANEL: CPT

## 2023-02-22 PROCEDURE — 1123F ACP DISCUSS/DSCN MKR DOCD: CPT | Performed by: STUDENT IN AN ORGANIZED HEALTH CARE EDUCATION/TRAINING PROGRAM

## 2023-02-22 PROCEDURE — G8417 CALC BMI ABV UP PARAM F/U: HCPCS | Performed by: STUDENT IN AN ORGANIZED HEALTH CARE EDUCATION/TRAINING PROGRAM

## 2023-02-22 PROCEDURE — 99215 OFFICE O/P EST HI 40 MIN: CPT | Performed by: STUDENT IN AN ORGANIZED HEALTH CARE EDUCATION/TRAINING PROGRAM

## 2023-02-22 RX ORDER — GUAIFENESIN 200 MG/10ML
200 LIQUID ORAL 3 TIMES DAILY PRN
COMMUNITY

## 2023-02-22 ASSESSMENT — ENCOUNTER SYMPTOMS
COUGH: 1
STRIDOR: 0
GASTROINTESTINAL NEGATIVE: 1
VOICE CHANGE: 1
SHORTNESS OF BREATH: 0
TROUBLE SWALLOWING: 1

## 2023-02-22 NOTE — PROGRESS NOTES
[address] [address]  [facility] [phone]  Clinic Progress Note      Reason for Visit:   Left tongue mass    Referring Provider: Alexey Longoria DO    PCP:  Joe Beach MD    Chief Complaint:   Chief Complaint   Patient presents with    Cancer    Follow-up     Tongue cancer       Subjective:  Patient has completed adjuvant radiation on 2/14/2023. She reports having sensitivity/pain of the oral cavity. He has occasional coughing/choking. He also complains of increased oral mucus production. HPI from Initial Outpatient Consultation - 10/14/2022:  The patient is a 69 y.o. male who comes in for consultation. Left tongue mass. At this time there is no confirmed diagnosis. 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 Was not until the last 3 months, when he felt the left aspect of his tongue started to feel harder/rougher. He sought evaluation with his dentist, then dermatologist and oral surgeon. Subsequently he was referred to ENT, seen by Dr. Tracie Boyd as malignancy became a concern. And then on 10/4/2022, FNA was done which was unfortunately nondiagnostic. CT soft tissue neck on 10/10/2022 noted a 3.8 x 1.8 x 2.6 cm tumor involving the lateral aspect of his tongue, and subsequent PET scan showed hypermetabolic activity in this area. He established with radiation oncology with Dr. Ursula Zarco for recommendations recently. And yesterday, he followed up with Dr. Tracie Boyd for veronica endoscopy and repeat biopsy. Panendoscopy did not reveal any other lesions apart from that of the left tongue.   We are currently awaiting for pathology at this time. Today, he establishes with me and was accompanied by his wife. He denies ever having significant pain. He denies of lymphadenopathy, unintended weight loss, difficulty swallowing. He denies history of smoking, drinking, or illicit drug use. Overall he appears comfortable and attentive. Review of Systems; Review of Systems   Constitutional:  Negative for chills and fever. HENT:  Positive for trouble swallowing and voice change. Increased oral mucus production and oral pain   Respiratory:  Positive for cough. Negative for shortness of breath and stridor. Cardiovascular:  Negative for chest pain and leg swelling. Gastrointestinal: Negative. Musculoskeletal: Negative. Skin: Negative. Neurological: Negative. Hematological:  Negative for adenopathy. Does not bruise/bleed easily. Psychiatric/Behavioral: Negative.          Past Medical History:      Diagnosis Date    Acid reflux disease     Cancer of oral cavity ([facility] 10/14/2022    Hyperlipidemia     Hypertension      Patient Active Problem List   Diagnosis    Tongue mass    Post-op pain    Cancer of oral cavity ([address]    Malnutrition ([address]    Primary cancer of oral cavity ([address]    Hypomagnesemia    Hypophosphatasia    Oropharyngeal dysphagia        Past Surgical History:      Procedure Laterality Date    COLONOSCOPY  09/27/2022    CYST REMOVAL      upper mid chest    GASTROSTOMY TUBE PLACEMENT N/A 11/8/2022    EGD PEG TUBE PLACEMENT performed by Mark Landeros MD at [address] [place] [address] Right 2021    LARYNGOSCOPY N/A 10/13/2022    DIRECT LARYNGOSCOPY, BRONCHOSCOPY, ESOPHAGOSCOPY performed by Carlos Eduardo Michelle MD at [address] [place] [address] 11/14/2022    MOUTH LESION BIOPSY EXCISION, ELISHA GLOSSECTOMY, NECK DISSECTION RADICAL, FREE FLAP GRAFT NECK DISSECTION-ROBOTIC, SKIN GRAFT LEFT THIGH, TRACH  TUBE PLACMENT performed by Carlos Eduardo Michelle MD at [address] 10/04/2022 Family History:  Family History   Problem Relation Age of Onset    High Blood Pressure Mother     Breast Cancer Mother     Heart Disease Mother     Heart Disease Father     Other Father         heart valve replacement    High Blood Pressure Sister     No Known Problems Maternal Aunt     No Known Problems Maternal Uncle     No Known Problems Paternal Aunt     No Known Problems Paternal Uncle     Stroke Maternal Grandmother     Cancer Maternal Grandfather         patient doesn't know site. Cancer Paternal Grandmother         patient doesn't know site. Stroke Paternal Grandfather     No Known Problems Maternal Cousin     No Known Problems Paternal Cousin     No Known Problems Daughter     No Known Problems Daughter     No Known Problems Son     No Known Problems Grandchild        Medications:  Reviewed and reconciled.   Current Outpatient Medications   Medication Sig Dispense Refill    guaiFENesin (ROBITUSSIN) 100 MG/5ML liquid Take 200 mg by mouth 3 times daily as needed for Cough      Magic Mouthwash (MIRACLE MOUTHWASH) Swish and spit 5 mLs 4 times daily as needed for Irritation 1:1:1:1 diphen, nystatin, lidocaine, maalox 480 mL 3    melatonin 5 MG TBDP disintegrating tablet Take 2 tablets by mouth nightly 30 tablet 3    omeprazole (PRILOSEC) 40 MG delayed release capsule Take 1 capsule by mouth every morning (before breakfast) 90 capsule 1    Cyanocobalamin (VITAMIN B 12 PO) Take 1 tablet by mouth daily      metoprolol (LOPRESSOR) 100 MG tablet Take 50 mg by mouth 2 times daily      Polyvinyl Alcohol-Povidone (REFRESH OP) Apply 4 drops to eye in the morning and at bedtime Indications: bilateral eyes      tetrahydrozoline 0.05 % ophthalmic solution Place 4 drops into both eyes 2 times daily      acetaminophen (TYLENOL) 500 MG tablet Take 1,000 mg by mouth daily as needed for Pain      loratadine (CLARITIN) 10 MG tablet Take 10 mg by mouth daily PRN      Multiple Vitamins-Minerals (CENTRUM SILVER PO) Take 1 tablet by mouth daily      Ascorbic Acid (VITAMIN C) 1000 MG tablet Take 1,000 mg by mouth daily      zinc 50 MG TABS tablet Take 100 mg by mouth 2 times daily      Cholecalciferol (VITAMIN D3) 50 MCG (2000 UT) CAPS Take 1 capsule by mouth daily      vitamin B-6 (PYRIDOXINE) 100 MG tablet Take 100 mg by mouth daily      atorvastatin (LIPITOR) 10 MG tablet Take 10 mg by mouth at bedtime       No current facility-administered medications for this visit. Social History:  Social History     Socioeconomic History    Marital status:      Spouse name: Not on file    Number of children: Not on file    Years of education: Not on file    Highest education level: Not on file   Occupational History    Not on file   Tobacco Use    Smoking status: Never     Passive exposure: Past    Smokeless tobacco: Never   Vaping Use    Vaping Use: Never used   Substance and Sexual Activity    Alcohol use: No    Drug use: No    Sexual activity: Not Currently   Other Topics Concern    Not on file   Social History Narrative    Not on file     Social Determinants of Health     Financial Resource Strain: Not on file   Food Insecurity: Not on file   Transportation Needs: Not on file   Physical Activity: Not on file   Stress: Not on file   Social Connections: Not on file   Intimate Partner Violence: Not on file   Housing Stability: Not on file       Allergies:  No Known Allergies    Physical Exam:  /81   Pulse 74   Temp 96.9 °F (36.1 °C)   Ht 5' 10\" (1.778 m)   Wt 208 lb 12.8 oz (94.7 kg)   SpO2 98%   BMI 29.96 kg/m²   Physical Exam  Constitutional:       General: He is not in acute distress. Appearance: He is not toxic-appearing. HENT:      Head: Normocephalic. Nose: Nose normal.      Mouth/Throat:      Mouth: Mucous membranes are moist.      Pharynx: Posterior oropharyngeal erythema present. Comments: Post surgical changes; oral ulcers  Eyes:      General: No scleral icterus.   Neck:      Comments: Skin on neck red/erythematous    Cardiovascular:      Rate and Rhythm: Normal rate and regular rhythm. Heart sounds: No murmur heard. Pulmonary:      Effort: No respiratory distress. Breath sounds: No stridor. No wheezing. Abdominal:      General: Abdomen is flat. There is no distension. Palpations: Abdomen is soft. There is no mass. Tenderness: There is no abdominal tenderness. Comments: PEG tube intact, area clean   Musculoskeletal:      Cervical back: Normal range of motion. No rigidity or tenderness. Right lower leg: No edema. Left lower leg: No edema. Lymphadenopathy:      Cervical: No cervical adenopathy. Skin:     Findings: No lesion or rash. Neurological:      General: No focal deficit present. Mental Status: He is alert and oriented to person, place, and time. Psychiatric:         Mood and Affect: Mood normal.         Behavior: Behavior normal.         Thought Content: Thought content normal.       ECOG PS 0    Pathology:              CT SOFT TISSUE NECK W CONTRAST    Result Date: 10/11/2022  EXAMINATION: CT OF THE NECK SOFT TISSUE WITH CONTRAST  10/10/2022 TECHNIQUE: CT of the neck was performed with the administration of intravenous contrast. Multiplanar reformatted images are provided for review. Automated exposure control, iterative reconstruction, and/or weight based adjustment of the mA/kV was utilized to reduce the radiation dose to as low as reasonably achievable. COMPARISON: None. HISTORY: ORDERING SYSTEM PROVIDED HISTORY: Tongue mass TECHNOLOGIST PROVIDED HISTORY: STAT Creatinine as needed:->No Reason for exam:->left lateral tongue mass FINDINGS: PHARYNX/LARYNX:  The palatine tonsils are normal in appearance. There is an asymmetrically enhancing focus in the left aspect of the tongue that measures approximately 3.8 x 1.8 x 2.6 cm. The valleculae, epiglottis, aryepiglottic folds and pyriform sinuses appear unremarkable.   The true and false vocal cords are normal in appearance. No mass or abscess is seen. SALIVARY GLANDS/THYROID:  The parotid and submandibular glands appear unremarkable. The thyroid gland appears unremarkable. LYMPH NODES:  No cervical or supraclavicular lymphadenopathy is seen. SOFT TISSUES:  No appreciable soft tissue swelling or mass is seen. BRAIN/ORBITS/SINUSES:  The visualized portion of the intracranial contents appear unremarkable. The visualized portion of the orbits, paranasal sinuses and mastoid air cells demonstrate no acute abnormality. LUNG APICES/SUPERIOR MEDIASTINUM:  No focal consolidation is seen within the visualized lung apices. No superior mediastinal lymphadenopathy or mass. The visualized portion of the trachea appears unremarkable. BONES:  No aggressive appearing lytic or blastic bony lesion. Asymmetrically enhancing mass in the left aspect of the tongue concerning for neoplastic process. Tissue sampling is recommended. PET CT SKULL BASE TO MID THIGH    Result Date: 10/11/2022  EXAMINATION: WHOLE BODY PET/CT 10/10/2022 TECHNIQUE: Following IV injection of 13.1 mCi of F-18 FDG, PET  tumor imaging was acquired from the skull vertex to the mid thighs. Computed tomography was used for purposes of attenuation correction and anatomic localization. Fusion imaging was utilized for interpretation. Uptake time 60 min. Glucose level 92 mg/dl. COMPARISON: CT scan of the neck 10/10/2022 HISTORY: ORDERING SYSTEM PROVIDED HISTORY: Tongue cancer [facility]) TECHNOLOGIST PROVIDED HISTORY: Reason for exam:->left lateral tongue mass What reading provider will be dictating this exam?->CRC FINDINGS: HEAD/NECK: Asymmetric activity along the left lateral aspect of the tongue where there is an enhancing mass described on the CT concurrent contrast-enhanced CT scan, SUV max of 9.9. No metabolically active cervical lymphadenopathy. CHEST: No metabolically active pulmonary nodules.   No metabolically active axillary, hilar, or mediastinal lymphadenopathy. ABDOMEN/PELVIS: No metabolically active intraperitoneal mass. No metabolically active abdominal or pelvic lymphadenopathy. Physiologic activity in the gastrointestinal and genitourinary systems. BONES/SOFT TISSUE: No abnormal FDG activity localizes to the bones. No aggressive osseous lesion. INCIDENTAL CT FINDINGS: Atherosclerotic calcifications within the coronary arteries and the aorta and its branches. Cholelithiasis. Excreted contrast material is present. 1.  Metabolic activity associated with the mass along the left lateral aspect of the tongue consistent with history of cancer. 2.  No metabolically active metastatic disease. ASSESSMENT:    Left tongue squamous cell carcinoma, moderately differentiated, pT3N0: The patient established with me on 10/14/2022. Upon first visit, no diagnosis has been obtained, as he had a negative FNA on 10/4/2022. Furthermore he has imaging findings concerning for malignancy on 10/10/2022 with CT soft tissue neck and PET scan. Of note he did have previous lesion of squamous hyperplasia of the left tongue back in 2020 which was resected, as noted below. Further details outlined on history above  Clinically, appears to be at least a T3 case if he indeed has squamous cell carcinoma. Discussed case during multidisciplinary tumor board on 10/18/2022. Pathology noted squamous cell carcinoma. On 11/14/2022, patient is status post left hemiglossectomy/lesion excision, left radical neck dissection with free flap graft (skin graft left thigh, left arm), and tracheostomy tube placement. Results of surgical pathology noted positive moderately differentiated invasive squamous cell carcinoma of lesser cornu/hyoid margin, left tongue, with all margins negative. Index tumor size was 3.8 x 2.5 x 2 cm, grade 2 (moderately differentiated, depth of invasion (DOI) at least 24 mm, LVI/PNI both were not identified.   Fourteen lymph nodes collected, all which are negative. Case presented again during multidisciplinary tumor board 11/29/2022, noting role of postoperative radiation, and omit chemotherapy. From December 2022 until mid February 2023, patient completed course of adjuvant radiation. Previous history of left tongue squamous hyperplasia: Noted on biopsy in 2020. There is also associated hyperkeratosis/parakeratosis with chronic inflammation. See above of snippet of report. PLAN:  Completed radiation last week  We had lengthy discussion regarding his post-treatment symptoms  Complains of oral pain/discomfort, oral mucus production, some cough  He continues to use different mouth washes including baking soda, Magic mouthwash, hydrogen peroxide, etc.  I noted his symptoms could last for weeks  He continues to use his PEG, hoping he could use less and potential have removed at some point. Also he has some challenges with swallowing  Continue to follow up with speech  And will be seeing ENT on 2/28/2023  With order PET before next follow-up  We briefly discussed surveillance protocol     Labs done today and reviewed with patient      DISPO:  RTC in 3-4 months with labs    Approximately spent 41 minutes on chart review as well as time spent on patient encounter, discussing the laboratory, imaging, and clinical findings. I have discussed clinical implications and recommendations on the patient's primary issues. More than 50% of time was spent counseling patient. The patient verbalized understanding.       Thank you for allowing us to participate in the care of Fauzia Pulido  02/22/23 3:20 PM    [facility]) Office  P: [phone]  F: [phone] [facility] Office  P: [phone]  F: [phone]

## 2023-02-23 ENCOUNTER — CLINICAL DOCUMENTATION (OUTPATIENT)
Dept: RADIATION ONCOLOGY | Age: 68
End: 2023-02-23

## 2023-02-23 NOTE — PROGRESS NOTES
Radiation Treatment Summary    Patient Name:  Clarisse Gee,  1955,  77 y.o., male       Referring Physician: No referring provider defined for this encounter. PCP: Keya Olsen MD       Diagnosis:  [id number]  Invasive Moderately-Differentiated Squamous Cell Carcinoma of the left lateral tongue, DOI 24mm         Site Start TX Last [place] ED Fractions Dose (cGy) Fx Dose (cGy) Technique   Left Oral Cavity and Zone I-IV 1/3/2023 2/3/2023 31 25 5000 200 VMAT   Left Oral Cavity boost 2/6/2023 2/14/2023 7 6 1200 200 VMAT   TOTAL 1/3/2023 2/14/2023 42 31 6200         Response/Tolerance: Over the course of treatment, Boneta Failing gained 2 pounds. He tolerated treatment well with the usual expected side effects including mucositis and xerostomia which were managed expectantly. Follow-up:  30-day in the office with Radiation Oncology      Thank you for the opportunity to participate in multidisciplinary management of this remarkable and pleasant patient. Kush Villaseñor MD, [address], [place], [address] [place] [address]    Department of Radiation Oncology  [facility]: [phone] (ZNZ: [phone])  [address] [place] [address]: [phone] (YIJ: [phone])  [facility]:  [phone] (O:  [phone])    NOTE: This report was transcribed using voice recognition software. Every effort was made to ensure accuracy; however, inadvertent computerized transcription errors may be present.

## 2023-02-24 NOTE — PROGRESS NOTES
2/28/23: Pt here for follow up tongue cancer, finished radiation therapy 2/14/23. Having pain due to sores in mouth and with acidic foods. Minimal PO intake, utilizing PEG. No recent changes in voice, but not much improvement. Would like to discuss SLP. Drinks 4-5 bottles of water, no caffeine, and no alcohol. Weight is stable.

## 2023-02-28 ENCOUNTER — OFFICE VISIT (OUTPATIENT)
Dept: ENT CLINIC | Age: 68
End: 2023-02-28

## 2023-02-28 VITALS — WEIGHT: 209 LBS | BODY MASS INDEX: 29.92 KG/M2 | HEIGHT: 70 IN

## 2023-02-28 DIAGNOSIS — Z92.3 STATUS POST RADIATION THERAPY: ICD-10-CM

## 2023-02-28 DIAGNOSIS — R13.10 DYSPHAGIA, UNSPECIFIED TYPE: ICD-10-CM

## 2023-02-28 DIAGNOSIS — C06.9 ORAL CANCER (HCC): ICD-10-CM

## 2023-02-28 DIAGNOSIS — C06.9 CANCER OF ORAL CAVITY (HCC): ICD-10-CM

## 2023-02-28 DIAGNOSIS — M95.3 ACQUIRED DEFORMITY OF NECK: ICD-10-CM

## 2023-02-28 DIAGNOSIS — C02.9 TONGUE CANCER (HCC): ICD-10-CM

## 2023-02-28 DIAGNOSIS — R13.12 OROPHARYNGEAL DYSPHAGIA: ICD-10-CM

## 2023-02-28 DIAGNOSIS — T66.XXXD RADIATION INJURY, SUBSEQUENT ENCOUNTER: Primary | ICD-10-CM

## 2023-02-28 DIAGNOSIS — C10.9 OROPHARYNX CANCER (HCC): ICD-10-CM

## 2023-02-28 DIAGNOSIS — R13.10 ODYNOPHAGIA: ICD-10-CM

## 2023-02-28 NOTE — PROGRESS NOTES
Dear Dr Lelo Espinal MD No ref. provider found     We had the pleasure of seeing Jermaine Ryan, 1955 here    on 2/28/2023  Please see below for review of care and plans. Chief complaint-     ICD-10-CM    1. Tongue cancer ([facility] [address]  C02.9 [address], [place]      2. Status post radiation therapy  Z92.3 [address], [place]      3. Odynophagia  R13.10       4. Oral cancer ([address]  C06.9       5. Oropharyngeal dysphagia  R13.12       6. Dysphagia, unspecified type  R13.10       7. Oropharynx cancer ([facility] [address]  C10.9       8. 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 Left lateral tongue. Noticed approximately 2-3 months ago. Non painful, non tender. Tolerating PO intake. Denies cervical lymphadenopathy. Denies weight loss. Denies appetite change. Denies difficulty swallowing or drinking. Has had previous biopsy of tongue lesion which came back benign. Non smoker. Recent biopsy- pos scca    10/20/22: Pt here for 1 wk p/o Panendo. States pain was improving fri and sat after Sx, then sun night started w/ a burning pain in throat and cheek. Has a white patch on cheek that is causing concern of infection. Also states he has increased foul odor from mouth. Some difficulty swallowing when pain flares up. No change in voice since recovering from Panendo. Drinks 3-4 glasses of water, green tea periodically, and no alcohol. Weight is stable. 12/8/22: Pt here for follow up. Still with nutrition via PEG tube. Weight stable. Trach eval    12/15/22: Pt here for 1 wk f/u after trach removal and tongue reduction. No pain. Had MBSS done and is able to start taking liquids PO.  No difficulty swallowing liquids. No change in voice. Drinks 4-5 bottles of water, no caffeine, and no alcohol. Weight is stable. Review of Systems- No drainage, discharge, or headache. Complete 10 system ROS completed and negative except as noted above. 1/31/23: Pt here for 6 wk follow up tongue cancer, started rads. Last rad treatment is 2/16/23. Pain and sores in mouth, eating causes flare ups. Difficulty swallowing due to irritation from radiation and bulk of tongue. No change in voice. Drinks 4-5 bottles of water, no caffeine, and no alcohol. Weight is stable. Wounds look well healing with some granulation on superior aspect of forearm stsg    2/28/23: Pt here for follow up tongue cancer, finished radiation therapy 2/14/23. Having pain due to sores in mouth and with acidic foods. Minimal PO intake, utilizing PEG. No recent changes in voice, but not much improvement. Would like to discuss SLP. Drinks 4-5 bottles of water, no caffeine, and no alcohol. Weight is stable. Physical Examination-   Vital Signs-BP (P) 134/83   Pulse (P) 88   Ht 5' 10\" (1.778 m)   Wt 209 lb (94.8 kg)   BMI 29.99 kg/m²     Ears- Tympanic membranes clear bilaterally. No middle ear effusion. No pre or post auricular tenderness. Nose- Nasal mucosa clear and dry. No significant septal deviation or inferior turbinate hypertrophy. Oral Cavity/Oropharynx- Floor of mouth and tongue are soft and nontender. No posterior pharyngeal erythema. + gag reflex left lateral tongue/floor of mouth ~2cm plaque like lesion with submucosal firmness. + healing biopsy sites intraorally. Pink flap is intact- incision intact with less biting of tongue, tongue and buccal mucosa with ulcerations   Neck- Soft and nontender. No masses, lesions, lymphadenopathy, or thyroid nodules appreciated.  Incisions c/d/I well healed  LUE: STSG c/d/I with two 1cm areas of granulation tissue in the superolateral aspect   LLE:  STSG donor site c/d/I well healing   Cranial Nerve- Cranial nerves II to XII intact. Extraocular muscles intact. No gross motor visual deficits. No spontaneous nystagmus. Face- No facial skin tenderness to palpation. Heart- No cyanosis, regular  Lungs- No stridor, no intercostal accessory muscle use  General- The patient is in no acute distress. A&O x3    Nasopharyngoscopy 12/15/22  Procedure note- after pt verbally consented, was sprayed nasally with 1:1 neosynephrine and xylocaine. Scope was passed and found nasal cavity with no lesion. nasopharynx clear, tonsil wnl without asymmetry, Tongue mobile with flap intact without dehisence and pink, vocal cords mobile bilaterally with full ab and ad duction. Hypopharynx clear, open and no masses. No scar at trach site, and able to see tube thru cords, secretions much less and controlled    Procedure note: Removal of excess flap tissue 12/8/22 due to redundancy of flap tissue and pt was biting this tissue and causing trauma to it and comprimising integrity of flap, flap revision was warranted of tongue flap. Area of was anesthetized with 3 cc lido with epinephrine 1:100,000 for hemostasis. The excess anterior tip of flap tissue was excised in an elipse and additional cuts made at ends to remove dog ears and edges were undermined and then tissue advanced in at both ends to close the defect in a fashion prevents it getting caught int he interocclusal line. Total of 4.5 x 3 cm - 13.5 cm sq. Hemostasis was maintained. Defect was repaired with 3-0 monocryl using horizontal mattress sutures and then with running interlocking. Oral cavity was suctioned. Patient tolerated the procedure well. Procedure note: Decannulation  Vic Divers was removed. Bandage placed with mastisol, tegaderm and silk tape. Scope 2/28/22  Procedure note- after pt verbally consented, was sprayed nasally with 1:1 neosynephrine and xylocaine. Scope was passed and found nasal cavity with no lesion.  nasopharynx clear, tonsil wnl without asymmetry, Tongue mobile with flap intact without dehisence and pink, vocal cords mobile bilaterally with full ab and ad duction. Hypopharynx clear, open and no masses. Medical Decision Making and Treatment Plan. Pt seen and examined, scope exam with grossly normal findings. FNA of tongue done in office today. Will schedule for panendoscopy. Follow up in 1 week for path results review. R/B/A of surgery discussed with pt. Pt understood, consent signed, and would like to proceed to surgery. No personal or family history of bleeding or adverse reaction to anesthesia. Suspect will  need upfront surgical therapy with recon and then adjuvant therapy based upon pathology  Present at tumor board- recommend upfront surgery with neck and path driven adjuvant therapy as indicated. Pan consult- ordered  Panendo- results-  pos scca. Posterior base of tongue mapping near 19 was pos  Recommend partial glossectomy with neck dissection and forearm free flap for txment as mass is 4 cm and with margins will have sig tissue loss. Long dw pt, wife and daughter on process of surgery. Recommend preop peg and trach day of- they udnerstood  Talked about rehab and fact that all children live in [place], offered ability to seek second opinion there if they wanted, also offered to have rehab there if he needed it. 13. R/B/A of surgery discussed with pt. Pt understood, consent signed, and would like to proceed to surgery. No personal or family history of bleeding or adverse reaction to anesthesia. 14. Trach dependence- removed after scope shoed clear airway and no ostruciton. - doing well, secretions less and controlling. Trach site intact with clear airway and no scar or grans. 15. Flap revised to prevent further truama. 16. Dw pt on swallowing and new bite plane after revision and also to get po in as speech gave hiim instrucitons on what and how to eat- reinforced. Continue speech therapy as needed for post surgery  17. Pt discussed at multidisciplinary board - recommend adjuvant xrt alone, no chemo. Dw pt and wife what this means and how he will heal  18. LUE STSG recipient site with granulation tissue, this was cauterized with silver nitrate 1/31/23, continue local wound care- improving  19. Follow up in 1month for continued surveillance   20 continue speech therapy for voice and swallow issues. 21 xrt changes- continue txment and mouthwash and oitnment to skin for side effects. 22. Referred to speech therapy   22 tsh ordered- done, wnl       Thank you for the opportunity to take part in the care of this very pleasant patient, [facility]  Sincerely,          Dg Tracie.  Antione Sheikh M.D., Ph.D., [address] [facility]   Department of Otolaryngology-Head and Neck Surgery  Chief of Head & Neck Surgical Oncology  Director Head & Neck Oncology Service Line

## 2023-03-13 ENCOUNTER — HOSPITAL ENCOUNTER (OUTPATIENT)
Dept: RADIATION ONCOLOGY | Age: 68
Discharge: HOME | End: 2023-03-13

## 2023-03-13 ENCOUNTER — TELEPHONE (OUTPATIENT)
Dept: RADIATION ONCOLOGY | Age: 68
End: 2023-03-13

## 2023-03-13 VITALS
TEMPERATURE: 97 F | DIASTOLIC BLOOD PRESSURE: 78 MMHG | RESPIRATION RATE: 18 BRPM | SYSTOLIC BLOOD PRESSURE: 132 MMHG | BODY MASS INDEX: 29.87 KG/M2 | OXYGEN SATURATION: 99 % | HEART RATE: 87 BPM | WEIGHT: 208.2 LBS

## 2023-03-13 NOTE — TELEPHONE ENCOUNTER
Christy Person  3/13/2023  Wt Readings from Last 10 Encounters:   03/13/23 208 lb 3.2 oz (94.4 kg)   02/28/23 209 lb (94.8 kg)   02/22/23 208 lb 12.8 oz (94.7 kg)   02/14/23 211 lb (95.7 kg)   02/07/23 212 lb 12.8 oz (96.5 kg)   01/31/23 213 lb 9.6 oz (96.9 kg)   01/24/23 215 lb (97.5 kg)   01/17/23 215 lb (97.5 kg)   01/12/23 214 lb 12.8 oz (97.4 kg)   01/03/23 215 lb 9.6 oz (97.8 kg)     Ht Readings from Last 1 Encounters:   02/28/23 5' 10\" (1.778 m)     BMI=29.87    Assessment: Met with Ross Kaufman and his wife, Priyanka More, while in for radiation 1 month follow up. Ross Kaufman reports that he has still be utilizing his PEG tube for 3 cartons of Two Jayjay HN per day and trying to eat more. He said his tongue is  and feels like a lot of stasis left in mouth after eating. He reports recently he has increased fluids with meals, alternating food and fluids and it has helped reduce stasis. He is going to reduce his cartons of TF to 2 per day and increase his oral intake. Ross Kaufman and his wife have a good understanding of adjusting tube feeding to his oral intake. Praise given for their effort and dedication to maintaining nutritional status. Encouraged them to call with questions or concerns. Weight change: 1.32% weight loss x 1 month, 2.62% weight loss x 3 months, 9.08% weight loss x 6 months  Appetite: Fair to good  Nutritional Side Effects: xerostomia, dysgeusia   Calculated Needs: 28-30kcal/kg/FLE=7513-8929thjqd, 1.3-1.5gm/kg/GOH=189-811ap protein, 1ml/kcal=2600-2800ml fluids  Malnutrition Status: At risk  Nutrition Diagnosis: At risk r/t H&N cancer. Recommendations: Reduce enteral nutrition to 2 cartons of Two Jayjay HN per day with 360ml water for each carton of Two Jayjay HN. Continue to increase your oral intake utilizing soft minced moist foods. Weigh weekly and increase/decrease tube feeding as needed to maintain you weight 205-210#.      Aleen Phoenix, RD

## 2023-03-13 NOTE — PROGRESS NOTES
- T12 compression fracture  - Appreciate Neurosurgery evaluation and recommendations  Non operative management recommended  - Maintain TLSO brace whenever upright and/or out of bed, or if patient unable to tolerate secondary to multiple rib fractures can wear brace p r n  For comfort  - Await upright thoracolumbar spine x-rays per Neurosurgery recommendations  - Continue multimodal analgesic regimen   - PT and OT evaluation and treatment as indicated    - Monitor neurologic exam  Gilbert Sanders  3/13/2023  11:13 AM      Vitals:    03/13/23 1107   BP: 132/78   Pulse: 87   Resp: 18   Temp: 97 °F (36.1 °C)   SpO2: 99%    :     Wt Readings from Last 3 Encounters:   03/13/23 208 lb 3.2 oz (94.4 kg)   02/28/23 209 lb (94.8 kg)   02/22/23 208 lb 12.8 oz (94.7 kg)                Current Outpatient Medications:     guaiFENesin (ROBITUSSIN) 100 MG/5ML liquid, Take 200 mg by mouth 3 times daily as needed for Cough, Disp: , Rfl:     Magic Mouthwash (MIRACLE MOUTHWASH), Swish and spit 5 mLs 4 times daily as needed for Irritation 1:1:1:1 diphen, nystatin, lidocaine, maalox, Disp: 480 mL, Rfl: 3    melatonin 5 MG TBDP disintegrating tablet, Take 2 tablets by mouth nightly, Disp: 30 tablet, Rfl: 3    omeprazole (PRILOSEC) 40 MG delayed release capsule, Take 1 capsule by mouth every morning (before breakfast), Disp: 90 capsule, Rfl: 1    Cyanocobalamin (VITAMIN B 12 PO), Take 1 tablet by mouth daily, Disp: , Rfl:     metoprolol (LOPRESSOR) 100 MG tablet, Take 50 mg by mouth 2 times daily, Disp: , Rfl:     Polyvinyl Alcohol-Povidone (REFRESH OP), Apply 4 drops to eye in the morning and at bedtime Indications: bilateral eyes, Disp: , Rfl:     tetrahydrozoline 0.05 % ophthalmic solution, Place 4 drops into both eyes 2 times daily, Disp: , Rfl:     acetaminophen (TYLENOL) 500 MG tablet, Take 1,000 mg by mouth daily as needed for Pain, Disp: , Rfl:     loratadine (CLARITIN) 10 MG tablet, Take 10 mg by mouth daily PRN, Disp: , Rfl:     Multiple Vitamins-Minerals (CENTRUM SILVER PO), Take 1 tablet by mouth daily, Disp: , Rfl:     Ascorbic Acid (VITAMIN C) 1000 MG tablet, Take 1,000 mg by mouth daily, Disp: , Rfl:     zinc 50 MG TABS tablet, Take 100 mg by mouth 2 times daily, Disp: , Rfl:     Cholecalciferol (VITAMIN D3) 50 MCG (2000 UT) CAPS, Take 1 capsule by mouth daily, Disp: , Rfl:     vitamin B-6 (PYRIDOXINE) 100 MG tablet, Take 100 mg by mouth daily, Disp: , Rfl:     atorvastatin (LIPITOR) 10 MG tablet, Take 10 mg by mouth at bedtime, Disp: , Rfl:       Patient is seen today in follow up for RT to his left lateral tongue for SCC. Patient received RT from 01/03/23-02/14/23, [id number] Lt. OC Zn I-IV completing 31fx;6200cGy. Patient denies any pain at this time but states he continues to have a lot of mucous. He continues to do baking soda and salt water and magic mouthwash rinses 4- 5 x's/day. Encouraged to do peroxide rinses, which patient stated he had thought those rinses were for a sore throat which has resolved. Informed patient that the magic mouthwash was for treatment of the sore throat and to try rotating the peroxide and salt water and baking soda rinses for the dry mouth and mucous. Patient states he is eating soft foods po and continues on 2-3 tube feedings per day. Patient states he has a dentist appointment tomorrow. Patient is following with Dr. Harmeet Pope for Med Onc and last seen on 02/22/23. All questions answered from a nursing perspective. FALLS RISK SCREENING ASSESSMENT    Instructions:  Assess the patient and enter the appropriate indicators that are present for fall risk identification. Total the numbers entered and assign a fall risk score from Table 2.  Reassess patient at a minimum every 12 weeks or with status change. Assessment   Date  3/13/2023     1. Mental Ability: confusion/cognitively impaired No - 0       2. Elimination Issues: incontinence, frequency No - 0       3. Ambulatory: use of assistive devices (walker, cane, off-loading devices), attached to equipment (IV pole, oxygen) No - 0     4. Sensory Limitations: dizziness, vertigo, impaired vision No - 0       5. Age 72 years or greater - 1       10. Medication: diuretics, strong analgesics, hypnotics, sedatives, antihypertensive agents   Yes - 3   7. Falls:  recent history of falls within the last 3 months (not to include slipping or tripping)   No - 0   TOTAL 4    If score of 4 or greater was education given?  Yes TABLE 2   Risk Score Risk Level Plan of Care   0-3 Little or  No Risk 1. Provide assistance as indicated for ambulation activities  2. Reorient confused/cognitively impaired patient  3. Call-light/bell within patient's reach  4. Chair/bed in low position, stretcher/bed with siderails up except when performing patient care activities  5. Educate patient/family/caregiver on falls prevention  6.  Reassess in 12 weeks or with any noted change in patient condition which places them at a risk for a fall   4-6 Moderate Risk 1. Provide assistance as indicated for ambulation activities  2. Reorient confused/cognitively impaired patient  3. Call-light/bell within patient's reach  4. Chair/bed in low position, stretcher/bed with siderails up except when performing patient care activities  5. Educate patient/family/caregiver on falls prevention  6. Falls risk precaution (Yellow sticker Level II) placed on patient chart   7 or   Higher High Risk 1. Place patient in easily observable treatment room  2. Patient attended at all times by family member or staff  3. Provide assistance as indicated for ambulation activities  4. Reorient confused/cognitively impaired patient  5. Call-light/bell within patient's reach  6. Chair/bed in low position, stretcher/bed with siderails up except when performing patient care activities  7. Educate patient/family/caregiver on falls prevention  8. Falls risk precaution (Yellow sticker Level III) placed on patient chart           MALNUTRITION RISK SCREENING ASSESSMENT    3/13/2023   Patient:  Mandie Welsh  Sex:  male    Instructions:  Assess the patient and enter the appropriate indicators that are present for nutrition risk identification. Total the numbers entered and assign a risk score. Follow the appropriate action for total score listed below. Assessment   Date  3/13/2023     Have you lost weight without trying?       1- Yes, 0.5 kg to 5 kg     Have you been eating poorly because of a decreased appetite?         1- Yes   3. Do you have a diagnosis of head and neck cancer?      2- Yes                                                                                    TOTAL 4          Score of 0-1: No action  Score 2 or greater:  For Non-Diabetic Patient: Recommend adding Ensure Complete 2 x daily and provide patient with Ensure wellness bag with coupons  For Diabetic Patient: Recommend adding Glucerna Shake 2 x daily and provide patient with Glucerna Wellness bag with coupons  Route to the dietitian via [facility]            Abril Montero RN

## 2023-03-13 NOTE — PROGRESS NOTES
DEPARTMENT OF RADIATION ONCOLOGY   Follow up visit        3/13/2023    NAME:  Becki Muñoz    :  1955 77 y.o. 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 STAGING: Cancer Staging  Cancer of oral cavity ([facility] [address]  Staging form: Lip And Oral Cavity, AJCC 8th Edition  - Pathologic stage from 10/14/2022: Stage III (pT3, pN0, cM0) - Signed by Devi Hudson MD on 2022      RECENT HISTORY: Becki Muñoz returns for a post radiation treatment follow up visit. Treatments were completed on 3/14/2023. He received 6200 cGy in 31 fractions. Was treated with radiation alone-no chemo. Current today, 1 month posttreatment overall feeling significantly improved. He continues to use his feeding tube but is only using 2 cans/day. He is able to eat soft foods by mouth such as mashed potatoes and macaroni and cheese. His weight remained stable. He denies any odynophagia but does have some hypersensitivity of the tongue to acidic foods. He is using salt and soda gargles frequently. Reports mild xerostomia and his taste is gradually returning to normal.  He states that his speech is \"fair\". Denies cough, shortness of breath, bone pain or neurologic symptoms. He recently saw Dr. Rosanna Rascon and will be getting a posttreatment PET scan at the end of May. He also continues frequent follow-up with Dr. Ariella Gandhi. Past medical, surgical, social and family histories reviewed and updated as indicated. ALLERGIES:  Patient has no known allergies.        MEDICATIONS:   Current Outpatient Medications:     guaiFENesin (ROBITUSSIN) 100 MG/5ML liquid, Take 200 mg by mouth 3 times daily as needed for Cough, Disp: , Rfl:     Magic Mouthwash (MIRACLE MOUTHWASH), Swish and spit 5 mLs 4 times daily as needed for Irritation 1:1:1:1 diphen, nystatin, lidocaine, maalox, Disp: 480 mL, Rfl: 3    melatonin 5 MG TBDP disintegrating tablet, Take 2 tablets by mouth nightly, Disp: 30 tablet, Rfl: 3    omeprazole (PRILOSEC) 40 MG delayed release capsule, Take 1 capsule by mouth every morning (before breakfast), Disp: 90 capsule, Rfl: 1    Cyanocobalamin (VITAMIN B 12 PO), Take 1 tablet by mouth daily, Disp: , Rfl:     metoprolol (LOPRESSOR) 100 MG tablet, Take 50 mg by mouth 2 times daily, Disp: , Rfl:     Polyvinyl Alcohol-Povidone (REFRESH OP), Apply 4 drops to eye in the morning and at bedtime Indications: bilateral eyes, Disp: , Rfl:     tetrahydrozoline 0.05 % ophthalmic solution, Place 4 drops into both eyes 2 times daily, Disp: , Rfl:     acetaminophen (TYLENOL) 500 MG tablet, Take 1,000 mg by mouth daily as needed for Pain, Disp: , Rfl:     loratadine (CLARITIN) 10 MG tablet, Take 10 mg by mouth daily PRN, Disp: , Rfl:     Multiple Vitamins-Minerals (CENTRUM SILVER PO), Take 1 tablet by mouth daily, Disp: , Rfl:     Ascorbic Acid (VITAMIN C) 1000 MG tablet, Take 1,000 mg by mouth daily, Disp: , Rfl:     zinc 50 MG TABS tablet, Take 100 mg by mouth 2 times daily, Disp: , Rfl:     Cholecalciferol (VITAMIN D3) 50 MCG (2000 UT) CAPS, Take 1 capsule by mouth daily, Disp: , Rfl:     vitamin B-6 (PYRIDOXINE) 100 MG tablet, Take 100 mg by mouth daily, Disp: , Rfl:     atorvastatin (LIPITOR) 10 MG tablet, Take 10 mg by mouth at bedtime, Disp: , Rfl:     REVIEW OF SYSTEMS: Obtained from the patient, chart review and nursing assessment. 12 system review negative except     PHYSICAL EXAMINATION:    There were no vitals filed for this visit.     Wt Readings from Last 3 Encounters:   02/28/23 209 lb (94.8 kg)   02/22/23 208 lb 12.8 oz (94.7 kg)   02/14/23 211 lb (95.7 kg)       [facility] PER[facility] STAT[facility]: 90    Constitutional: A well developed, well nourished 77 y.o. male who is alert, oriented, cooperative and in no apparent distress. Oral cavity is moist.  There is postsurgical changes on the left lateral tongue. No suspicious masses or mucosal abnormalities. Ability is good. Neck is supple without adenopathy. Postsurgical changes on the left but no suspicious findings. TUMOR MARKERS: No results found for: PSA, CEA, , [id number],     IMAGING REVIEWED:  No results found. ASSESSMENT/PLAN:  [id number]  Invasive Moderately-Differentiated Squamous Cell Carcinoma of the left lateral tongue, DOI 24mm    He is 1 month status post adjuvant radiation therapy alone following definitive surgery and is currently recovering expected side effects of treatment . At length we discussed expected healing process over the next 6 to 12 months and all of his questions were answered to his satisfaction. He is hopeful to get his feeding tube removed relatively soon as he is using it less and less. He was advised to continue using salt and soda gargles as needed to slowly advance his diet. He has been set up for speech therapy he was encouraged to pursue this. He has further follow-up scheduled with Dr. Fernando Castro next week. He has a PET can scheduled at the end of May and he was recommended to return here for routine follow-up in June. I asked Renald Gosselin  to contact us at any time for any questions or concerns. Thank you for the opportunity to participate in multidisciplinary management of this  pleasant patient. A total of 25 minutes was spent on this visit reviewing previous notes, previous diagnostic labs and x-rays, assessing the patient, counseling the patient regarding their diagnosis and further follow-up needs and documenting this in the medical record. Dakotah Wilcox M.D.     Department of Radiation Oncology        [facility] ([address] [facility]) [facility]: [phone] (LI: [phone])  [facility]: [phone] (Z: [phone])  [facility]) [facility]:  [phone] (CFD:  [phone])

## 2023-03-15 ENCOUNTER — EVALUATION (OUTPATIENT)
Dept: SPEECH THERAPY | Age: 68
End: 2023-03-15
Payer: MEDICARE

## 2023-03-15 DIAGNOSIS — R13.12 OROPHARYNGEAL DYSPHAGIA: Primary | ICD-10-CM

## 2023-03-15 PROCEDURE — 92610 EVALUATE SWALLOWING FUNCTION: CPT | Performed by: SPEECH-LANGUAGE PATHOLOGIST

## 2023-03-22 ENCOUNTER — TREATMENT (OUTPATIENT)
Dept: SPEECH THERAPY | Age: 68
End: 2023-03-22
Payer: MEDICARE

## 2023-03-22 DIAGNOSIS — R13.12 OROPHARYNGEAL DYSPHAGIA: Primary | ICD-10-CM

## 2023-03-22 PROCEDURE — 92526 ORAL FUNCTION THERAPY: CPT | Performed by: SPEECH-LANGUAGE PATHOLOGIST

## 2023-03-23 ENCOUNTER — TELEPHONE (OUTPATIENT)
Dept: RADIATION ONCOLOGY | Age: 68
End: 2023-03-23

## 2023-03-23 NOTE — TELEPHONE ENCOUNTER
Received phone call from Jen Spain, with Nely Walker also present in background. Jen Spain was interested in ways to increase oral calories through drinks, to help reduce need for tube feeding. Nely Walker is working with SLP and reports there are no restrictions on consistency, liquids or solids. Discussed ways to increase calories in foods that he is eating, different additives, high calorie drinks, and commercial nutrition supplements. Discussed Boost VHCand offered samples at [facility]. Nely Walker and his wife came into  samples and written resources. Provided to them were Boost VHC and [employer] (and ideas sheet for use) and informed them that they are available through online retailers. Also provided resources of \"Recipes for High Calorie Drinks\", \"High Calorie/or Protein Foods\" (which list different protein powders that can be used in high calorie drink recipes) and [employer] Calorie Food Options\". Discussed goal is to reach 2600calories per day and as these items are added, tube feeding can be reduced as long as weight is maintained. They voiced understanding. Encouraged them to call with further questions. Nely Walker is highly motivated to have PEG tube removed.

## 2023-03-29 NOTE — PROGRESS NOTES
during PO intake to maintain adequate nutrition/hydration and decrease signs/symptoms of aspiration to less than 1 x/day. Patient/family Goal:    To be able to eat regular foods and drink regular liquids    Plan of care discussed with Patient and Family   The Patient and Family understand(s) the diagnosis, prognosis and plan of care     Rehabilitation Potential/Prognosis: good                    ADMITTING DIAGNOSIS: Tongue cancer ([facility] [address] [C02.9]; Status post radiation therapy [Z92.3]    VISIT DIAGNOSIS:      PATIENT REPORT/COMPLAINT: difficulty chewing  patient currently on modified diet. Fearful of eating/choking due to recent radiation completion     PRIOR LEVEL OF SWALLOW FUNCTION:    PAST HISTORY OF DYSPHAGIA?: yes resulting from radiation treatment for head and neck cancer    Home diet: Soft and bite size consistency solids (IDDSI level 6) with  thin liquids (IDDSI level 0) - [facility] is also tolerating PEG tube feeding x3 daily. PROCEDURE:  Consistencies Administered During the Evaluation   Liquids: thin liquid   Solids:  soft solid foods      Method of Intake:   cup  Self fed      Position:   Seated, upright    CLINICAL ASSESSMENT:  Oral Stage:       Decreased mastication due to:  decreased lingual control  Delayed A-P transit due to: reduced lingual strength   Oral residuals were noted :  throughout the oral cavity      Pharyngeal Stage:    Multiple swallows were noted after presentation of all consistencies administered    Cognition:   Within functional limits for this exam    Oral Peripheral Examination   Generalized oral weakness    Current Respiratory Status    room air     Parameters of Speech Production  Respiration:  Adequate for speech production  Quality:   Within functional limits  Intensity: Within functional limits    Volitional Swallow: present     Volitional Cough:   present     Pain: No pain reported.       EDUCATION:   The Speech Language Pathologist (SLP) completed education regarding

## 2023-04-06 NOTE — PROGRESS NOTES
Patient seen for 45 minute in person session this date. Patient doing fair. He reports that he saw his ENT specialist yesterday and was told all was healing well. He reports that he is using his PEG tube 2-3 x/day. Observed today with thin liquids and patient appears to be tolerating them well with no overt s/s of distress. Patient was instructed to contact his dietician to find out what supplement he could take orally that would equal the calories/nutrition in his tube feedings so he can eliminate tube feedings. He seemed hesitant but it was explained to him that if he can be eating and drinking orally, he would be one step closer to having the tube removed. He continues to voice some hesitation with eating more for fear of choking. Reassurance provided and patient was encouraged to start adding more small textures into his diet. Today, we worked on completion of all exercises with patient demonstrating fair+/good performance with min cues. We reviewed all safe swallow strategies. Patient encouraged to continue with all exercises and food trials. Will continue. Walter Bazan.  Simon Cool MA/YONNY-SLP  [id number]  [facility] [address]

## 2023-04-25 ENCOUNTER — TREATMENT (OUTPATIENT)
Dept: SPEECH THERAPY | Age: 68
End: 2023-04-25
Payer: MEDICARE

## 2023-04-25 ENCOUNTER — OFFICE VISIT (OUTPATIENT)
Dept: ENT CLINIC | Age: 68
End: 2023-04-25
Payer: MEDICARE

## 2023-04-25 VITALS
SYSTOLIC BLOOD PRESSURE: 131 MMHG | HEIGHT: 70 IN | DIASTOLIC BLOOD PRESSURE: 79 MMHG | HEART RATE: 90 BPM | WEIGHT: 209 LBS | BODY MASS INDEX: 29.92 KG/M2

## 2023-04-25 DIAGNOSIS — R13.10 DYSPHAGIA, UNSPECIFIED TYPE: ICD-10-CM

## 2023-04-25 DIAGNOSIS — T66.XXXD RADIATION INJURY, SUBSEQUENT ENCOUNTER: ICD-10-CM

## 2023-04-25 DIAGNOSIS — C10.9 OROPHARYNX CANCER (HCC): ICD-10-CM

## 2023-04-25 DIAGNOSIS — R13.12 OROPHARYNGEAL DYSPHAGIA: ICD-10-CM

## 2023-04-25 DIAGNOSIS — C02.9 TONGUE CANCER (HCC): ICD-10-CM

## 2023-04-25 DIAGNOSIS — M95.3 ACQUIRED DEFORMITY OF NECK: ICD-10-CM

## 2023-04-25 DIAGNOSIS — C06.9 CANCER OF ORAL CAVITY (HCC): ICD-10-CM

## 2023-04-25 DIAGNOSIS — Z93.1 S/P PERCUTANEOUS ENDOSCOPIC GASTROSTOMY (PEG) TUBE PLACEMENT (HCC): ICD-10-CM

## 2023-04-25 DIAGNOSIS — R13.10 ODYNOPHAGIA: ICD-10-CM

## 2023-04-25 DIAGNOSIS — R13.12 OROPHARYNGEAL DYSPHAGIA: Primary | ICD-10-CM

## 2023-04-25 DIAGNOSIS — Z92.3 STATUS POST RADIATION THERAPY: ICD-10-CM

## 2023-04-25 DIAGNOSIS — R49.0 DYSPHONIA: Primary | ICD-10-CM

## 2023-04-25 DIAGNOSIS — C06.9 ORAL CANCER (HCC): ICD-10-CM

## 2023-04-25 PROCEDURE — 1036F TOBACCO NON-USER: CPT | Performed by: OTOLARYNGOLOGY

## 2023-04-25 PROCEDURE — G8417 CALC BMI ABV UP PARAM F/U: HCPCS | Performed by: OTOLARYNGOLOGY

## 2023-04-25 PROCEDURE — 99214 OFFICE O/P EST MOD 30 MIN: CPT | Performed by: OTOLARYNGOLOGY

## 2023-04-25 PROCEDURE — 1123F ACP DISCUSS/DSCN MKR DOCD: CPT | Performed by: OTOLARYNGOLOGY

## 2023-04-25 PROCEDURE — G8427 DOCREV CUR MEDS BY ELIG CLIN: HCPCS | Performed by: OTOLARYNGOLOGY

## 2023-04-25 PROCEDURE — 92526 ORAL FUNCTION THERAPY: CPT | Performed by: SPEECH-LANGUAGE PATHOLOGIST

## 2023-04-25 PROCEDURE — 31575 DIAGNOSTIC LARYNGOSCOPY: CPT | Performed by: OTOLARYNGOLOGY

## 2023-04-25 PROCEDURE — 3017F COLORECTAL CA SCREEN DOC REV: CPT | Performed by: OTOLARYNGOLOGY

## 2023-05-01 ENCOUNTER — OFFICE VISIT (OUTPATIENT)
Dept: SURGERY | Age: 68
End: 2023-05-01
Payer: MEDICARE

## 2023-05-01 VITALS
SYSTOLIC BLOOD PRESSURE: 142 MMHG | BODY MASS INDEX: 29.92 KG/M2 | DIASTOLIC BLOOD PRESSURE: 96 MMHG | WEIGHT: 209 LBS | TEMPERATURE: 98 F | HEIGHT: 70 IN | HEART RATE: 80 BPM

## 2023-05-01 DIAGNOSIS — Z43.1 PEG (PERCUTANEOUS ENDOSCOPIC GASTROSTOMY) ADJUSTMENT/REPLACEMENT/REMOVAL (HCC): Primary | ICD-10-CM

## 2023-05-01 PROCEDURE — 1036F TOBACCO NON-USER: CPT | Performed by: SURGERY

## 2023-05-01 PROCEDURE — G8417 CALC BMI ABV UP PARAM F/U: HCPCS | Performed by: SURGERY

## 2023-05-01 PROCEDURE — 3017F COLORECTAL CA SCREEN DOC REV: CPT | Performed by: SURGERY

## 2023-05-01 PROCEDURE — G8428 CUR MEDS NOT DOCUMENT: HCPCS | Performed by: SURGERY

## 2023-05-01 PROCEDURE — 99213 OFFICE O/P EST LOW 20 MIN: CPT | Performed by: SURGERY

## 2023-05-01 PROCEDURE — 1123F ACP DISCUSS/DSCN MKR DOCD: CPT | Performed by: SURGERY

## 2023-05-01 NOTE — PROGRESS NOTES
Surgery Progress Note            Chief complaint:   Chief Complaint   Patient presents with    Follow-up     Patient here today for PEG tube removal.      Patient Active Problem List   Diagnosis    Tongue mass    Post-op pain    Cancer of oral cavity ([facility] [address]    Malnutrition ([facility] [address]    Primary cancer of oral cavity ([facility] [address]    Hypomagnesemia    Hypophosphatasia    Oropharyngeal dysphagia       S: doing well    O:   Vitals:    05/01/23 1250   BP: (!) 142/96   Pulse: 80   Temp: 98 °F (36.7 °C)     No intake or output data in the 24 hours ending 05/01/23 1300        Labs:  Lab Results   Component Value Date/Time    WBC 6.4 02/22/2023 01:30 PM    WBC 8.0 11/21/2022 05:47 AM    WBC 8.1 11/20/2022 04:36 AM    HGB 14.7 02/22/2023 01:30 PM    HGB 10.8 11/21/2022 05:47 AM    HGB 10.5 11/20/2022 04:36 AM    HCT 45.7 02/22/2023 01:30 PM    HCT 32.7 11/21/2022 05:47 AM    HCT 31.6 11/20/2022 04:36 AM    HCT 31.0 11/14/2022 07:17 PM    HCT 32.0 11/14/2022 05:43 PM    HCT 33.0 11/14/2022 01:55 PM     Lab Results   Component Value Date    CREATININE 0.8 02/22/2023    BUN 22 02/22/2023     02/22/2023    K 4.6 02/22/2023    CL 97 (L) 02/22/2023    CO2 25 02/22/2023     No results found for: LIPASE, AMYLASE      Physical exam:   BP (!) 142/96   Pulse 80   Temp 98 °F (36.7 °C)   Ht 5' 10\" (1.778 m)   Wt 209 lb (94.8 kg)   BMI 29.99 kg/m²   General appearance: NAD  Head: NCAT  Neck: supple, no masses  Lungs: equal chest rise bilateral  Heart: S1S2 present  Abdomen: soft, nontender, nondistended,  peg in place  Skin; no lesions  Gu: no cva tenderness  Extremities: extremities normal, atraumatic, no cyanosis or edema    A:  peg in place and no longer using    P: peg removed and site bandaged without issue    Cliff Burns MD, MD  5/1/2023

## 2023-05-11 NOTE — PROGRESS NOTES
Patient seen for 30 minute in person session this date. Patient doing well. He reports that he saw the ENT specialist and will be having the feeding tube removed soon. He is also going to be scheduled for a PET scan. We reviewed all safe swallow protocol and patient reports no s/s of distress with any consistencies. He states that he continues to complete all exercises as instructed. Encouraged him to continue with these daily to help maintain current functional level. We completed oral motor exercises with fair+/good performance today. He has been informally using over articulation strategies during conversations but he was instructed to use this strategy during homework practice of reading aloud or in conversations with family. We agreed to decrease frequency of visits to every other week in preparation for discharge. Tank Cheatham.  Phuc Mcfadden MA/CCC-SLP  [id number]  [facility] [id number]

## 2023-05-15 ENCOUNTER — HOSPITAL ENCOUNTER (OUTPATIENT)
Dept: NUCLEAR MEDICINE | Age: 68
Discharge: HOME | End: 2023-05-15
Payer: MEDICARE

## 2023-05-15 DIAGNOSIS — C76.0 HEAD AND NECK CANCER (HCC): ICD-10-CM

## 2023-05-15 PROCEDURE — 3430000000 HC RX DIAGNOSTIC RADIOPHARMACEUTICAL: Performed by: RADIOLOGY

## 2023-05-15 PROCEDURE — A9552 F18 FDG: HCPCS | Performed by: RADIOLOGY

## 2023-05-15 RX ORDER — FLUDEOXYGLUCOSE F 18 200 MCI/ML
10 INJECTION, SOLUTION INTRAVENOUS
Status: COMPLETED | OUTPATIENT
Start: 2023-05-15 | End: 2023-05-15

## 2023-05-15 RX ADMIN — FLUDEOXYGLUCOSE F 18 10 MILLICURIE: 200 INJECTION, SOLUTION INTRAVENOUS at 15:38

## 2023-05-26 ENCOUNTER — HOSPITAL ENCOUNTER (OUTPATIENT)
Dept: INFUSION THERAPY | Age: 68
Discharge: HOME | End: 2023-05-26
Payer: MEDICARE

## 2023-05-26 ENCOUNTER — OFFICE VISIT (OUTPATIENT)
Dept: ONCOLOGY | Age: 68
End: 2023-05-26
Payer: MEDICARE

## 2023-05-26 VITALS
DIASTOLIC BLOOD PRESSURE: 77 MMHG | OXYGEN SATURATION: 99 % | HEIGHT: 70 IN | SYSTOLIC BLOOD PRESSURE: 137 MMHG | HEART RATE: 76 BPM | BODY MASS INDEX: 28.89 KG/M2 | WEIGHT: 201.8 LBS | TEMPERATURE: 97.4 F

## 2023-05-26 DIAGNOSIS — C76.0 HEAD AND NECK CANCER (HCC): Primary | ICD-10-CM

## 2023-05-26 DIAGNOSIS — E89.0 POSTABLATIVE HYPOTHYROIDISM: ICD-10-CM

## 2023-05-26 DIAGNOSIS — C76.0 HEAD AND NECK CANCER (HCC): ICD-10-CM

## 2023-05-26 LAB
ALBUMIN SERPL-MCNC: 4.3 G/DL (ref 3.5–5.2)
ALP SERPL-CCNC: 79 U/L (ref 40–129)
ALT SERPL-CCNC: 14 U/L (ref 0–40)
ANION GAP SERPL CALCULATED.3IONS-SCNC: 10 MMOL/L (ref 7–16)
AST SERPL-CCNC: 21 U/L (ref 0–39)
BASOPHILS # BLD: 0 E9/L (ref 0–0.2)
BASOPHILS NFR BLD: 0.6 % (ref 0–2)
BILIRUB SERPL-MCNC: 0.6 MG/DL (ref 0–1.2)
BUN SERPL-MCNC: 15 MG/DL (ref 6–23)
CALCIUM SERPL-MCNC: 10 MG/DL (ref 8.6–10.2)
CHLORIDE SERPL-SCNC: 103 MMOL/L (ref 98–107)
CO2 SERPL-SCNC: 26 MMOL/L (ref 22–29)
CREAT SERPL-MCNC: 0.9 MG/DL (ref 0.7–1.2)
EOSINOPHIL # BLD: 0 E9/L (ref 0.05–0.5)
EOSINOPHIL NFR BLD: 0 % (ref 0–6)
ERYTHROCYTE [DISTWIDTH] IN BLOOD BY AUTOMATED COUNT: 12.9 FL (ref 11.5–15)
GLUCOSE SERPL-MCNC: 130 MG/DL (ref 74–99)
HCT VFR BLD AUTO: 44.4 % (ref 37–54)
HGB BLD-MCNC: 14.6 G/DL (ref 12.5–16.5)
LYMPHOCYTES # BLD: 0.21 E9/L (ref 1.5–4)
LYMPHOCYTES NFR BLD: 4.4 % (ref 20–42)
MCH RBC QN AUTO: 27.5 PG (ref 26–35)
MCHC RBC AUTO-ENTMCNC: 32.9 % (ref 32–34.5)
MCV RBC AUTO: 83.8 FL (ref 80–99.9)
MONOCYTES # BLD: 0.64 E9/L (ref 0.1–0.95)
MONOCYTES NFR BLD: 12.3 % (ref 2–12)
NEUTROPHILS # BLD: 4.4 E9/L (ref 1.8–7.3)
NEUTS SEG NFR BLD: 83.3 % (ref 43–80)
PLATELET # BLD AUTO: 239 E9/L (ref 130–450)
PMV BLD AUTO: 10.1 FL (ref 7–12)
POTASSIUM SERPL-SCNC: 4.4 MMOL/L (ref 3.5–5)
PROT SERPL-MCNC: 7.3 G/DL (ref 6.4–8.3)
RBC # BLD AUTO: 5.3 E12/L (ref 3.8–5.8)
SODIUM SERPL-SCNC: 139 MMOL/L (ref 132–146)
TSH SERPL-MCNC: 2.57 UIU/ML (ref 0.27–4.2)
WBC # BLD: 5.3 E9/L (ref 4.5–11.5)

## 2023-05-26 PROCEDURE — 99215 OFFICE O/P EST HI 40 MIN: CPT | Performed by: STUDENT IN AN ORGANIZED HEALTH CARE EDUCATION/TRAINING PROGRAM

## 2023-05-26 PROCEDURE — 85025 COMPLETE CBC W/AUTO DIFF WBC: CPT

## 2023-05-26 PROCEDURE — G8427 DOCREV CUR MEDS BY ELIG CLIN: HCPCS | Performed by: STUDENT IN AN ORGANIZED HEALTH CARE EDUCATION/TRAINING PROGRAM

## 2023-05-26 PROCEDURE — 1123F ACP DISCUSS/DSCN MKR DOCD: CPT | Performed by: STUDENT IN AN ORGANIZED HEALTH CARE EDUCATION/TRAINING PROGRAM

## 2023-05-26 PROCEDURE — 80053 COMPREHEN METABOLIC PANEL: CPT

## 2023-05-26 PROCEDURE — 3017F COLORECTAL CA SCREEN DOC REV: CPT | Performed by: STUDENT IN AN ORGANIZED HEALTH CARE EDUCATION/TRAINING PROGRAM

## 2023-05-26 PROCEDURE — G8417 CALC BMI ABV UP PARAM F/U: HCPCS | Performed by: STUDENT IN AN ORGANIZED HEALTH CARE EDUCATION/TRAINING PROGRAM

## 2023-05-26 PROCEDURE — 84443 ASSAY THYROID STIM HORMONE: CPT

## 2023-05-26 PROCEDURE — 1036F TOBACCO NON-USER: CPT | Performed by: STUDENT IN AN ORGANIZED HEALTH CARE EDUCATION/TRAINING PROGRAM

## 2023-05-26 PROCEDURE — 36415 COLL VENOUS BLD VENIPUNCTURE: CPT

## 2023-05-26 ASSESSMENT — ENCOUNTER SYMPTOMS
GASTROINTESTINAL NEGATIVE: 1
SHORTNESS OF BREATH: 0
VOICE CHANGE: 1
STRIDOR: 0

## 2023-05-26 NOTE — PROGRESS NOTES
Heart sounds: No murmur heard. Pulmonary:      Effort: No respiratory distress. Breath sounds: No stridor. Abdominal:      General: Abdomen is flat. There is no distension. Palpations: Abdomen is soft. Comments: PEG tube intact, area clean   Musculoskeletal:      Cervical back: Normal range of motion. No rigidity or tenderness. Right lower leg: No edema. Left lower leg: No edema. Lymphadenopathy:      Cervical: No cervical adenopathy (Right submandibular angle). Skin:     Findings: No lesion or rash. Neurological:      General: No focal deficit present. Mental Status: He is alert and oriented to person, place, and time. Psychiatric:         Mood and Affect: Mood normal.         Behavior: Behavior normal.         Thought Content: Thought content normal.       ECOG PS 0    Pathology:              CT SOFT TISSUE NECK W CONTRAST    Result Date: 10/11/2022  EXAMINATION: CT OF THE NECK SOFT TISSUE WITH CONTRAST  10/10/2022 TECHNIQUE: CT of the neck was performed with the administration of intravenous contrast. Multiplanar reformatted images are provided for review. Automated exposure control, iterative reconstruction, and/or weight based adjustment of the mA/kV was utilized to reduce the radiation dose to as low as reasonably achievable. COMPARISON: None. HISTORY: ORDERING SYSTEM PROVIDED HISTORY: Tongue mass TECHNOLOGIST PROVIDED HISTORY: STAT Creatinine as needed:->No Reason for exam:->left lateral tongue mass FINDINGS: PHARYNX/LARYNX:  The palatine tonsils are normal in appearance. There is an asymmetrically enhancing focus in the left aspect of the tongue that measures approximately 3.8 x 1.8 x 2.6 cm. The valleculae, epiglottis, aryepiglottic folds and pyriform sinuses appear unremarkable. The true and false vocal cords are normal in appearance. No mass or abscess is seen. SALIVARY GLANDS/THYROID:  The parotid and submandibular glands appear unremarkable.   The thyroid

## 2023-05-31 ENCOUNTER — TELEPHONE (OUTPATIENT)
Dept: ENT CLINIC | Age: 68
End: 2023-05-31

## 2023-05-31 ENCOUNTER — PREP FOR PROCEDURE (OUTPATIENT)
Dept: ENT CLINIC | Age: 68
End: 2023-05-31

## 2023-05-31 PROBLEM — R22.1 MASS OF RIGHT SIDE OF NECK: Status: ACTIVE | Noted: 2023-05-31

## 2023-05-31 NOTE — TELEPHONE ENCOUNTER
Prior Authorization Form:      DEMOGRAPHICS:                     Patient Name:  Lizet Fuentes  Patient :  1955            Insurance:  Payor: MEDICARE / Plan: MEDICARE PART A AND B / Product Type: *No Product type* /   Insurance ID Number:    Payer/Plan Subscr  Sex Relation Sub. Ins. ID Effective Group Num   1. 1000 Located within [facility] J 1955 Male Self [id number]                                    [address] [address]   2.  Rhianna 48 J 1955 Male Self [id number]                                    [address]         DIAGNOSIS & PROCEDURE:                       Procedure/Operation: DIRECT LARYNGOSCOPY, BRONCHOSCOPY, ESOPHAGOSCOPY WITH RIGHT NECK MAPPING BIOPSIES           CPT Code: 15970, 23456, 53549, 16174    Diagnosis:  RIGHT NECK MASS    ICD10 Code: R22.1    [place]  [facility]    Surgeon:  DR Bryon Ramos INFORMATION:                          Date: 23    Time: N/A              Anesthesia:  General                                                       Status:  Outpatient        Special Comments:  PATHOLOGY       Electronically signed by Franca Hickey MA on 2023 at 8:31 AM

## 2023-06-06 ENCOUNTER — HOSPITAL ENCOUNTER (OUTPATIENT)
Dept: ULTRASOUND IMAGING | Age: 68
Discharge: HOME | End: 2023-06-06
Payer: MEDICARE

## 2023-06-06 DIAGNOSIS — R22.1 MASS OF RIGHT SIDE OF NECK: ICD-10-CM

## 2023-06-06 DIAGNOSIS — R22.1 MASS OF RIGHT SIDE OF NECK: Primary | ICD-10-CM

## 2023-06-06 PROCEDURE — 10005 FNA BX W/US GDN 1ST LES: CPT

## 2023-06-06 PROCEDURE — 88173 CYTOPATH EVAL FNA REPORT: CPT

## 2023-06-06 PROCEDURE — 88305 TISSUE EXAM BY PATHOLOGIST: CPT

## 2023-06-07 ENCOUNTER — ANESTHESIA EVENT (OUTPATIENT)
Dept: OPERATING ROOM | Age: 68
End: 2023-06-07
Payer: MEDICARE

## 2023-06-08 ENCOUNTER — HOSPITAL ENCOUNTER (OUTPATIENT)
Age: 68
Setting detail: OUTPATIENT SURGERY
Discharge: HOME HEALTH | End: 2023-06-08
Attending: OTOLARYNGOLOGY | Admitting: OTOLARYNGOLOGY
Payer: MEDICARE

## 2023-06-08 ENCOUNTER — ANESTHESIA (OUTPATIENT)
Dept: OPERATING ROOM | Age: 68
End: 2023-06-08
Payer: MEDICARE

## 2023-06-08 VITALS
TEMPERATURE: 97 F | HEIGHT: 70 IN | HEART RATE: 82 BPM | DIASTOLIC BLOOD PRESSURE: 84 MMHG | OXYGEN SATURATION: 98 % | BODY MASS INDEX: 28.63 KG/M2 | RESPIRATION RATE: 14 BRPM | WEIGHT: 200 LBS | SYSTOLIC BLOOD PRESSURE: 144 MMHG

## 2023-06-08 DIAGNOSIS — R22.1 MASS OF RIGHT SIDE OF NECK: ICD-10-CM

## 2023-06-08 DIAGNOSIS — Z01.812 PRE-OPERATIVE LABORATORY EXAMINATION: Primary | ICD-10-CM

## 2023-06-08 DIAGNOSIS — C06.9 CANCER OF ORAL CAVITY (HCC): ICD-10-CM

## 2023-06-08 LAB
ERYTHROCYTE [DISTWIDTH] IN BLOOD BY AUTOMATED COUNT: 13.2 FL (ref 11.5–15)
HCT VFR BLD AUTO: 43.6 % (ref 37–54)
HGB BLD-MCNC: 14.2 G/DL (ref 12.5–16.5)
MCH RBC QN AUTO: 27.5 PG (ref 26–35)
MCHC RBC AUTO-ENTMCNC: 32.6 % (ref 32–34.5)
MCV RBC AUTO: 84.5 FL (ref 80–99.9)
PLATELET # BLD AUTO: 201 E9/L (ref 130–450)
PMV BLD AUTO: 9.8 FL (ref 7–12)
RBC # BLD AUTO: 5.16 E12/L (ref 3.8–5.8)
WBC # BLD: 4.2 E9/L (ref 4.5–11.5)

## 2023-06-08 PROCEDURE — 85027 COMPLETE CBC AUTOMATED: CPT

## 2023-06-08 PROCEDURE — 2580000003 HC RX 258: Performed by: STUDENT IN AN ORGANIZED HEALTH CARE EDUCATION/TRAINING PROGRAM

## 2023-06-08 PROCEDURE — 7100000010 HC PHASE II RECOVERY - FIRST 15 MIN: Performed by: OTOLARYNGOLOGY

## 2023-06-08 PROCEDURE — 6360000002 HC RX W HCPCS

## 2023-06-08 PROCEDURE — 6370000000 HC RX 637 (ALT 250 FOR IP): Performed by: OTOLARYNGOLOGY

## 2023-06-08 PROCEDURE — 3600000015 HC SURGERY LEVEL 5 ADDTL 15MIN: Performed by: OTOLARYNGOLOGY

## 2023-06-08 PROCEDURE — 3700000000 HC ANESTHESIA ATTENDED CARE: Performed by: OTOLARYNGOLOGY

## 2023-06-08 PROCEDURE — 3600000005 HC SURGERY LEVEL 5 BASE: Performed by: OTOLARYNGOLOGY

## 2023-06-08 PROCEDURE — 36415 COLL VENOUS BLD VENIPUNCTURE: CPT

## 2023-06-08 PROCEDURE — 2580000003 HC RX 258

## 2023-06-08 PROCEDURE — 2500000003 HC RX 250 WO HCPCS

## 2023-06-08 PROCEDURE — 7100000001 HC PACU RECOVERY - ADDTL 15 MIN: Performed by: OTOLARYNGOLOGY

## 2023-06-08 PROCEDURE — 2709999900 HC NON-CHARGEABLE SUPPLY: Performed by: OTOLARYNGOLOGY

## 2023-06-08 PROCEDURE — 7100000011 HC PHASE II RECOVERY - ADDTL 15 MIN: Performed by: OTOLARYNGOLOGY

## 2023-06-08 PROCEDURE — 3700000001 HC ADD 15 MINUTES (ANESTHESIA): Performed by: OTOLARYNGOLOGY

## 2023-06-08 PROCEDURE — 7100000000 HC PACU RECOVERY - FIRST 15 MIN: Performed by: OTOLARYNGOLOGY

## 2023-06-08 RX ORDER — FENTANYL CITRATE 50 UG/ML
25 INJECTION, SOLUTION INTRAMUSCULAR; INTRAVENOUS EVERY 5 MIN PRN
Status: DISCONTINUED | OUTPATIENT
Start: 2023-06-08 | End: 2023-06-08 | Stop reason: HOSPADM

## 2023-06-08 RX ORDER — DEXAMETHASONE SODIUM PHOSPHATE 10 MG/ML
INJECTION INTRAMUSCULAR; INTRAVENOUS PRN
Status: DISCONTINUED | OUTPATIENT
Start: 2023-06-08 | End: 2023-06-08 | Stop reason: SDUPTHER

## 2023-06-08 RX ORDER — ROCURONIUM BROMIDE 10 MG/ML
INJECTION, SOLUTION INTRAVENOUS PRN
Status: DISCONTINUED | OUTPATIENT
Start: 2023-06-08 | End: 2023-06-08 | Stop reason: SDUPTHER

## 2023-06-08 RX ORDER — PROPOFOL 10 MG/ML
INJECTION, EMULSION INTRAVENOUS PRN
Status: DISCONTINUED | OUTPATIENT
Start: 2023-06-08 | End: 2023-06-08 | Stop reason: SDUPTHER

## 2023-06-08 RX ORDER — SODIUM CHLORIDE 0.9 % (FLUSH) 0.9 %
5-40 SYRINGE (ML) INJECTION EVERY 12 HOURS SCHEDULED
Status: DISCONTINUED | OUTPATIENT
Start: 2023-06-08 | End: 2023-06-08 | Stop reason: HOSPADM

## 2023-06-08 RX ORDER — SODIUM CHLORIDE 9 MG/ML
INJECTION, SOLUTION INTRAVENOUS PRN
Status: DISCONTINUED | OUTPATIENT
Start: 2023-06-08 | End: 2023-06-08 | Stop reason: HOSPADM

## 2023-06-08 RX ORDER — SODIUM CHLORIDE 0.9 % (FLUSH) 0.9 %
5-40 SYRINGE (ML) INJECTION PRN
Status: DISCONTINUED | OUTPATIENT
Start: 2023-06-08 | End: 2023-06-08 | Stop reason: HOSPADM

## 2023-06-08 RX ORDER — ONDANSETRON 2 MG/ML
4 INJECTION INTRAMUSCULAR; INTRAVENOUS
Status: DISCONTINUED | OUTPATIENT
Start: 2023-06-08 | End: 2023-06-08 | Stop reason: HOSPADM

## 2023-06-08 RX ORDER — EPINEPHRINE NASAL SOLUTION 1 MG/ML
SOLUTION NASAL PRN
Status: DISCONTINUED | OUTPATIENT
Start: 2023-06-08 | End: 2023-06-08 | Stop reason: ALTCHOICE

## 2023-06-08 RX ORDER — MORPHINE SULFATE 2 MG/ML
INJECTION, SOLUTION INTRAMUSCULAR; INTRAVENOUS
Status: COMPLETED
Start: 2023-06-08 | End: 2023-06-08

## 2023-06-08 RX ORDER — SUCCINYLCHOLINE/SOD CL,ISO/PF 200MG/10ML
SYRINGE (ML) INTRAVENOUS PRN
Status: DISCONTINUED | OUTPATIENT
Start: 2023-06-08 | End: 2023-06-08 | Stop reason: SDUPTHER

## 2023-06-08 RX ORDER — SODIUM CHLORIDE 9 MG/ML
INJECTION, SOLUTION INTRAVENOUS CONTINUOUS PRN
Status: DISCONTINUED | OUTPATIENT
Start: 2023-06-08 | End: 2023-06-08 | Stop reason: SDUPTHER

## 2023-06-08 RX ORDER — FENTANYL CITRATE 50 UG/ML
INJECTION, SOLUTION INTRAMUSCULAR; INTRAVENOUS PRN
Status: DISCONTINUED | OUTPATIENT
Start: 2023-06-08 | End: 2023-06-08 | Stop reason: SDUPTHER

## 2023-06-08 RX ORDER — GLYCOPYRROLATE 0.2 MG/ML
INJECTION INTRAMUSCULAR; INTRAVENOUS PRN
Status: DISCONTINUED | OUTPATIENT
Start: 2023-06-08 | End: 2023-06-08 | Stop reason: SDUPTHER

## 2023-06-08 RX ORDER — ONDANSETRON 2 MG/ML
INJECTION INTRAMUSCULAR; INTRAVENOUS PRN
Status: DISCONTINUED | OUTPATIENT
Start: 2023-06-08 | End: 2023-06-08 | Stop reason: SDUPTHER

## 2023-06-08 RX ORDER — MORPHINE SULFATE 2 MG/ML
2 INJECTION, SOLUTION INTRAMUSCULAR; INTRAVENOUS EVERY 5 MIN PRN
Status: DISCONTINUED | OUTPATIENT
Start: 2023-06-08 | End: 2023-06-08 | Stop reason: HOSPADM

## 2023-06-08 RX ORDER — SODIUM CHLORIDE, SODIUM LACTATE, POTASSIUM CHLORIDE, CALCIUM CHLORIDE 600; 310; 30; 20 MG/100ML; MG/100ML; MG/100ML; MG/100ML
INJECTION, SOLUTION INTRAVENOUS CONTINUOUS PRN
Status: DISCONTINUED | OUTPATIENT
Start: 2023-06-08 | End: 2023-06-08 | Stop reason: SDUPTHER

## 2023-06-08 RX ORDER — ONDANSETRON 4 MG/1
4 TABLET, FILM COATED ORAL 3 TIMES DAILY PRN
Qty: 15 TABLET | Refills: 0 | Status: SHIPPED | OUTPATIENT
Start: 2023-06-08

## 2023-06-08 RX ORDER — LIDOCAINE HYDROCHLORIDE 20 MG/ML
INJECTION, SOLUTION INTRAVENOUS PRN
Status: DISCONTINUED | OUTPATIENT
Start: 2023-06-08 | End: 2023-06-08 | Stop reason: SDUPTHER

## 2023-06-08 RX ORDER — HYDROCODONE BITARTRATE AND ACETAMINOPHEN 5; 325 MG/1; MG/1
1 TABLET ORAL EVERY 6 HOURS PRN
Qty: 28 TABLET | Refills: 0 | Status: SHIPPED | OUTPATIENT
Start: 2023-06-08 | End: 2023-06-15

## 2023-06-08 RX ORDER — LABETALOL HYDROCHLORIDE 5 MG/ML
INJECTION, SOLUTION INTRAVENOUS PRN
Status: DISCONTINUED | OUTPATIENT
Start: 2023-06-08 | End: 2023-06-08 | Stop reason: SDUPTHER

## 2023-06-08 RX ORDER — MIDAZOLAM HYDROCHLORIDE 1 MG/ML
INJECTION INTRAMUSCULAR; INTRAVENOUS PRN
Status: DISCONTINUED | OUTPATIENT
Start: 2023-06-08 | End: 2023-06-08 | Stop reason: SDUPTHER

## 2023-06-08 RX ADMIN — GLYCOPYRROLATE 0.2 MG: 0.2 INJECTION INTRAMUSCULAR; INTRAVENOUS at 07:28

## 2023-06-08 RX ADMIN — LIDOCAINE HYDROCHLORIDE 60 MG: 20 INJECTION, SOLUTION INTRAVENOUS at 07:31

## 2023-06-08 RX ADMIN — MORPHINE SULFATE 2 MG: 2 INJECTION, SOLUTION INTRAMUSCULAR; INTRAVENOUS at 10:23

## 2023-06-08 RX ADMIN — SUGAMMADEX 200 MG: 100 INJECTION, SOLUTION INTRAVENOUS at 09:39

## 2023-06-08 RX ADMIN — ONDANSETRON 4 MG: 2 INJECTION INTRAMUSCULAR; INTRAVENOUS at 08:25

## 2023-06-08 RX ADMIN — ROCURONIUM BROMIDE 10 MG: 10 INJECTION, SOLUTION INTRAVENOUS at 08:16

## 2023-06-08 RX ADMIN — SODIUM CHLORIDE, POTASSIUM CHLORIDE, SODIUM LACTATE AND CALCIUM CHLORIDE: 600; 310; 30; 20 INJECTION, SOLUTION INTRAVENOUS at 08:48

## 2023-06-08 RX ADMIN — FENTANYL CITRATE 50 MCG: 50 INJECTION, SOLUTION INTRAMUSCULAR; INTRAVENOUS at 07:51

## 2023-06-08 RX ADMIN — ROCURONIUM BROMIDE 20 MG: 10 INJECTION, SOLUTION INTRAVENOUS at 07:43

## 2023-06-08 RX ADMIN — ROCURONIUM BROMIDE 10 MG: 10 INJECTION, SOLUTION INTRAVENOUS at 08:52

## 2023-06-08 RX ADMIN — SODIUM CHLORIDE: 9 INJECTION, SOLUTION INTRAVENOUS at 07:21

## 2023-06-08 RX ADMIN — LABETALOL HYDROCHLORIDE 2.5 MG: 5 INJECTION INTRAVENOUS at 08:12

## 2023-06-08 RX ADMIN — ROCURONIUM BROMIDE 10 MG: 10 INJECTION, SOLUTION INTRAVENOUS at 07:58

## 2023-06-08 RX ADMIN — LABETALOL HYDROCHLORIDE 2.5 MG: 5 INJECTION INTRAVENOUS at 08:26

## 2023-06-08 RX ADMIN — DEXAMETHASONE SODIUM PHOSPHATE 10 MG: 10 INJECTION INTRAMUSCULAR; INTRAVENOUS at 07:41

## 2023-06-08 RX ADMIN — MIDAZOLAM 1 MG: 1 INJECTION INTRAMUSCULAR; INTRAVENOUS at 07:21

## 2023-06-08 RX ADMIN — SODIUM CHLORIDE: 9 INJECTION, SOLUTION INTRAVENOUS at 05:59

## 2023-06-08 RX ADMIN — MIDAZOLAM 1 MG: 1 INJECTION INTRAMUSCULAR; INTRAVENOUS at 07:27

## 2023-06-08 RX ADMIN — FENTANYL CITRATE 50 MCG: 50 INJECTION, SOLUTION INTRAMUSCULAR; INTRAVENOUS at 07:31

## 2023-06-08 RX ADMIN — LABETALOL HYDROCHLORIDE 2.5 MG: 5 INJECTION INTRAVENOUS at 08:22

## 2023-06-08 RX ADMIN — ROCURONIUM BROMIDE 10 MG: 10 INJECTION, SOLUTION INTRAVENOUS at 07:31

## 2023-06-08 RX ADMIN — LABETALOL HYDROCHLORIDE 2.5 MG: 5 INJECTION INTRAVENOUS at 08:35

## 2023-06-08 RX ADMIN — Medication 140 MG: at 07:31

## 2023-06-08 RX ADMIN — PROPOFOL 160 MG: 10 INJECTION, EMULSION INTRAVENOUS at 07:31

## 2023-06-08 RX ADMIN — LABETALOL HYDROCHLORIDE 2.5 MG: 5 INJECTION INTRAVENOUS at 08:07

## 2023-06-08 RX ADMIN — LABETALOL HYDROCHLORIDE 2.5 MG: 5 INJECTION INTRAVENOUS at 08:02

## 2023-06-08 ASSESSMENT — PAIN SCALES - GENERAL
PAINLEVEL_OUTOF10: 8
PAINLEVEL_OUTOF10: 5

## 2023-06-08 ASSESSMENT — PAIN DESCRIPTION - LOCATION
LOCATION: THROAT
LOCATION: THROAT

## 2023-06-08 ASSESSMENT — PAIN - FUNCTIONAL ASSESSMENT: PAIN_FUNCTIONAL_ASSESSMENT: 0-10

## 2023-06-08 NOTE — H&P
Ezio Márquez was seen and re-examined preoperatively today, June 8, 2023. There was no substantial change in his physical and medical status. Patient is fit for the proposed surgical procedure. All questions were appropriately addressed and had no further questions regarding the risks, benefits, and alternatives of the procedure. Ezio Márquez and family wished to proceed.     Catherine Jon DO  Resident Physician  [facility]  Otolaryngology Residency  6/8/2023  7:00 AM
Panendo. Drinks 3-4 glasses of water, green tea periodically, and no alcohol. Weight is stable. 12/8/22: Pt here for follow up. Still with nutrition via PEG tube. Weight stable. Trach eval     12/15/22: Pt here for 1 wk f/u after trach removal and tongue reduction. No pain. Had MBSS done and is able to start taking liquids PO. No difficulty swallowing liquids. No change in voice. Drinks 4-5 bottles of water, no caffeine, and no alcohol. Weight is stable. Review of Systems- No drainage, discharge, or headache. Complete 10 system ROS completed and negative except as noted above. 1/31/23: Pt here for 6 wk follow up tongue cancer, started rads. Last rad treatment is 2/16/23. Pain and sores in mouth, eating causes flare ups. Difficulty swallowing due to irritation from radiation and bulk of tongue. No change in voice. Drinks 4-5 bottles of water, no caffeine, and no alcohol. Weight is stable. Wounds look well healing with some granulation on superior aspect of forearm stsg     2/28/23: Pt here for follow up tongue cancer, finished radiation therapy 2/14/23. Having pain due to sores in mouth and with acidic foods. Minimal PO intake, utilizing PEG. No recent changes in voice, but not much improvement. Would like to discuss SLP. Drinks 4-5 bottles of water, no caffeine, and no alcohol. Weight is stable. 3/21/23: Pt here for 1 mo tongue CA. No pain. No difficulty swallowing. No change in voice. Drinks 4-5 bottles of water, no caffeine, and no alcohol. Weight is stable. Started with speech therapy. 4/25/23: Pt here for 1 mo follow up tongue CA. No pain. No difficulty swallowing. No change in voice but still with asome articulation issues with tongue. Drinks 4-5 bottles of water, no caffeine, and no alcohol. Weight is stable. Physical Examination-   Vital Signs-/79   Pulse 90   Ht 5' 10\" (1.778 m)   Wt 209 lb (94.8 kg)   BMI 29.99 kg/m²     Ears- Tympanic membranes clear bilaterally.   No

## 2023-06-08 NOTE — PROGRESS NOTES
CLINICAL PHARMACY NOTE: MEDS TO BEDS    Total # of Prescriptions Filled: 2   The following medications were delivered to the patient:  Norco 5-325 mg  Ondansetron 4 mg    Additional Documentation:     Picked up by Carlotta Chavez (wife) in the pharmacy

## 2023-06-08 NOTE — OP NOTE
Questionable mass on the border of the free flap and native tongue in the posterior left base of tongue. Rigid bronchoscopy performed with a 0 degree patel ami used to visualize the subglottic area  and then down the trachea into the right and left mainstem which all were clear of dissease. Partial glossectomy:  Biopsies were taken with the cup forceps of left base of tongue at the border of the free flap and native tongue where it localized on the scan and was firm/necrotic. Multiple biopsies superficial and deep were performed and sent for frozen and permanent (see specimens above). A #15 blade was used to excise a portion of the that area deep to the original biopsies. Hemostasis was achieved with epinephrine-impregnated cottonoid pledgets. The patient tolerated the procedure very well. The patient was awakened, extubated, and taken to the recovery room in good condition. There was minimal blood loss. The patient received preoperative antibiotics. There were no perioperative complications. Dr. Zulema Gross was present and scrubbed the entire case.     Electronically signed by Patricia Nichols DO on 6/8/2023 at 9:55 AM

## 2023-06-08 NOTE — PROGRESS NOTES
INTRAOPERATIVE CONSULTATION (with FROZEN SECTION)   B. Left base of tongue, biopsy: Benign squamous mucosa showing ulceration/necrosis.

## 2023-06-08 NOTE — ANESTHESIA PRE PROCEDURE
Department of Anesthesiology  Preprocedure Note       Name:  Tenisha Damico   Age:  77 y.o.  :  1955                                          MRN:  [id number]         Date:  2023      Surgeon: Orlando Charles):  Lakhwinder Monaco MD    Procedure: Procedure(s):  DIRECT LARYNGOSCOPY/BRONCHOSCOPY/ESOPHAGOSCOPY  NECK MASS MAPPING BIOPSY    Medications prior to admission:   Prior to Admission medications    Medication Sig Start Date End Date Taking?  Authorizing Provider   NONFORMULARY ZZZQuil   1 capfull at bedtime (30 ml)   Yes Historical Provider, MD   guaiFENesin (ROBITUSSIN) 100 MG/5ML liquid Take 10 mLs by mouth 3 times daily as needed for Cough    Historical Provider, MD   Magic Mouthwash (MIRACLE MOUTHWASH) Swish and spit 5 mLs 4 times daily as needed for Irritation 1:1:1:1 diphen, nystatin, lidocaine, maalox 23   Natalie Jin III, MD   melatonin 5 MG TBDP disintegrating tablet Take 2 tablets by mouth nightly 22   Marc Simpson DO   omeprazole (PRILOSEC) 40 MG delayed release capsule Take 1 capsule by mouth every morning (before breakfast) 22   Dilshad Way DO   metoprolol (LOPRESSOR) 100 MG tablet Take 1 tablet by mouth daily    Historical Provider, MD   Polyvinyl Alcohol-Povidone (REFRESH OP) Apply 4 drops to eye in the morning and at bedtime Indications: bilateral eyes    Historical Provider, MD   acetaminophen (TYLENOL) 500 MG tablet Take 2 tablets by mouth daily as needed for Pain    Historical Provider, MD   loratadine (CLARITIN) 10 MG tablet Take 1 tablet by mouth daily    Historical Provider, MD   atorvastatin (LIPITOR) 10 MG tablet Take 1 tablet by mouth at bedtime    Historical Provider, MD       Current medications:    Current Facility-Administered Medications   Medication Dose Route Frequency Provider Last Rate Last Admin    sodium chloride flush 0.9 % injection 5-40 mL  5-40 mL IntraVENous 2 times per day Marc Simpson DO        sodium chloride flush 0.9 % injection 5-40

## 2023-06-08 NOTE — PROGRESS NOTES
INTRAOPERATIVE CONSULTATION (with FROZEN SECTION)   C. Tongue side, biopsy: Benign squamous mucosa showing ulceration/necrosis. D. Free flap side, biopsy: Benign squamous mucosa showing ulceration/necrosis.

## 2023-06-08 NOTE — BRIEF OP NOTE
Brief Postoperative Note      Patient: Alexis Mancia  YOB: 1955  MRN: [id number]    Date of Procedure: 6/8/2023    Pre-Op Diagnosis Codes:     * Mass of right side of neck [R22.1]    Post-Op Diagnosis: Same       Procedure(s):  DIRECT LARYNGOSCOPY/BRONCHOSCOPY/ESOPHAGOSCOPY    Surgeon(s):  Ct Morales MD    Assistant:  Resident: Indy Vaz DO    Anesthesia: General    Estimated Blood Loss (mL): less than 518     Complications: None    Specimens:   ID Type Source Tests Collected by Time Destination   A : LEFT BASE OF TONGUE JUST MEDIAL TO FREE FLAP AT [address] Morales MD 6/8/2023 0802    B : RE-EXCISION LEFT BASE OF TONGUE JUST MEDIAL TO FREE FLAP AT LEVEL OF SUPRAEPIGLOTIC HORIZONTAL PLANE    Tissue Tissue SURGICAL PATHOLOGY Ct Morales MD 6/8/2023 9124    C : BIOPSY FROM TONGUE SIDE OF NECROTIC TISSUE Tissue Tissue SURGICAL PATHOLOGY Ct Morales MD 6/8/2023 0009    D : BIOPSY FROM FREE FLAP SIDE OF NECROTIC TISSUE Tissue Tissue SURGICAL PATHOLOGY Ct Morales MD 6/8/2023 2556        Implants:  * No implants in log *      Drains:   Gastrostomy/Enterostomy/Jejunostomy Tube LUQ 20 fr (Active)       Findings: see op report       Electronically signed by Indy Vaz DO on 6/8/2023 at 9:54 AM

## 2023-06-08 NOTE — PROGRESS NOTES
INTRAOPERATIVE CONSULTATION (with FROZEN SECTION)   A. Left base of tongue, biopsy: Benign squamous mucosa showing ulceration/necrosis.

## 2023-06-08 NOTE — DISCHARGE INSTRUCTIONS
Follow up with Dr Kierra Cross in 1 week    Soft diet for the first few days, the advance as tolerated  Some oozing expected   Ok to shower  Normal activity

## 2023-06-08 NOTE — ANESTHESIA POSTPROCEDURE EVALUATION
Department of Anesthesiology  Postprocedure Note    Patient: Ana M Bang  MRN: [id number]  YOB: 1955  Date of evaluation: 6/8/2023      Procedure Summary     Date: 06/08/23 Room / Location: [facility] OR  / [facility]    Anesthesia Start: 4490 Anesthesia Stop: 1008    Procedure: DIRECT LARYNGOSCOPY/BRONCHOSCOPY (Right: Neck) Diagnosis:       Mass of right side of neck      (Mass of right side of neck [R22.1])    Surgeons: Ron Luque MD Responsible Provider: Erick Marie DO    Anesthesia Type: general ASA Status: 3          Anesthesia Type: No value filed.     Abby Phase I: Abby Score: 10    Abby Phase II:        Anesthesia Post Evaluation    Patient location during evaluation: PACU  Patient participation: complete - patient participated  Level of consciousness: awake and alert  Pain score: 1  Airway patency: patent  Nausea & Vomiting: no nausea and no vomiting  Complications: no  Cardiovascular status: hemodynamically stable  Respiratory status: acceptable  Hydration status: euvolemic

## 2023-06-20 ENCOUNTER — CASE MANAGEMENT (OUTPATIENT)
Dept: ENT CLINIC | Age: 68
End: 2023-06-20

## 2023-06-20 NOTE — PROGRESS NOTES
Patient discussed at [facility] and Neck multidisciplinary tumor board conference on: 6/20/23  Presenting Physician: Dr. Genesis Huizar  Conference Review Type: Zoom    Summary    77y.o. 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 Now with recurrence to right level 2 lymph node. Primary Site: left lateral tongue  Histology:  Moderately-differentiated squamous cell carcinoma  National Guidelines Discussed: NCCN    Recommendations  Patient is currently seeking a second opinion in [address] [place] [address], ,  Resident Physician, PGY-1  Department of Otolaryngology, [facility] ([facility])

## 2023-07-06 NOTE — H&P
History of Present Illness- 68 y/o male presents to our clinic for evaluation of tongue lesion. Left lateral tongue. Noticed approximately 2-3 months ago. Non painful, non tender. Tolerating PO intake. Denies cervical lymphadenopathy. Denies weight loss. Denies appetite change. Denies difficulty swallowing or drinking. Has had previous biopsy of tongue lesion which came back benign. Non smoker. Review of Systems- No drainage, discharge, or headache. Complete 10 system ROS completed and negative except as noted above. Physical Examination-   Vital Signs-Ht 5' 10\" (1.778 m)   Wt 229 lb (103.9 kg)   BMI 32.86 kg/m²     Ears- Tympanic membranes clear bilaterally. No middle ear effusion. No pre or post auricular tenderness. Nose- Nasal mucosa clear and dry. No significant septal deviation or inferior turbinate hypertrophy. Oral Cavity/Oropharynx- Floor of mouth and tongue are soft and nontender. No posterior pharyngeal erythema. + gag reflex left lateral tongue/floor of mouth ~2cm plaque like lesion with submucosal firmness. Neck- Soft and nontender. No masses, lesions, lymphadenopathy, or thyroid nodules appreciated. Cranial Nerve- Cranial nerves II to XII intact. Extraocular muscles intact. No gross motor visual deficits. No spontaneous nystagmus. Face- No facial skin tenderness to palpation. Heart- No cyanosis, regular  Lungs- No stridor, no intercostal accessory muscle use  General- The patient is in no acute distress. A&O x3     Nasopharyngoscopy  Procedure note- after pt verbally consented, was sprayed nasally with 1:1 neosynephrine and xylocaine. Scope was passed and found nasal cavity with no lesion. nasopharynx clear, tonsil wnl without asymmetry, tongue mobile and no masses, vocal cords mobile bilaterally with full ab and ad duction. Hypopharynx clear, open and no masses. Medical Decision Making and Treatment Plan.   Pt seen and examined, scope exam with grossly normal findings. FNA of tongue done in office today. Will schedule for panendoscopy. Follow up in 1 week for path results review. Thank you for the opportunity to take part in the care of this very pleasant patient, Nir Mehta  Sincerely,             Rigoberto Washburn.  Isabela Dunn M.D., Ph.D., [facility]   Department of Otolaryngology-Head and Neck Surgery  Chief of Head & Neck Surgical Oncology  Director Head & Neck Oncology Service Line negative...

## 2023-07-21 ENCOUNTER — APPOINTMENT (OUTPATIENT)
Dept: GENERAL RADIOLOGY | Age: 68
End: 2023-07-21
Payer: MEDICARE

## 2023-07-21 ENCOUNTER — HOSPITAL ENCOUNTER (EMERGENCY)
Age: 68
Discharge: HOME | End: 2023-07-21
Attending: EMERGENCY MEDICINE
Payer: MEDICARE

## 2023-07-21 VITALS
BODY MASS INDEX: 27.35 KG/M2 | HEART RATE: 89 BPM | WEIGHT: 191 LBS | OXYGEN SATURATION: 99 % | SYSTOLIC BLOOD PRESSURE: 136 MMHG | HEIGHT: 70 IN | DIASTOLIC BLOOD PRESSURE: 97 MMHG | TEMPERATURE: 98 F | RESPIRATION RATE: 18 BRPM

## 2023-07-21 VITALS
OXYGEN SATURATION: 98 % | SYSTOLIC BLOOD PRESSURE: 131 MMHG | HEART RATE: 95 BPM | TEMPERATURE: 97.3 F | DIASTOLIC BLOOD PRESSURE: 95 MMHG | RESPIRATION RATE: 18 BRPM

## 2023-07-21 DIAGNOSIS — Z01.89 ENCOUNTER FOR IMAGING STUDY TO CONFIRM NASOGASTRIC (NG) TUBE PLACEMENT: Primary | ICD-10-CM

## 2023-07-21 DIAGNOSIS — Z46.59 ENCOUNTER FOR NASOGASTRIC (NG) TUBE PLACEMENT: ICD-10-CM

## 2023-07-21 DIAGNOSIS — K94.20 COMPLICATION OF FEEDING TUBE (HCC): Primary | ICD-10-CM

## 2023-07-21 PROCEDURE — 71045 X-RAY EXAM CHEST 1 VIEW: CPT

## 2023-07-21 PROCEDURE — 43753 TX GASTRO INTUB W/ASP: CPT

## 2023-07-21 PROCEDURE — 99283 EMERGENCY DEPT VISIT LOW MDM: CPT

## 2023-07-21 PROCEDURE — 74018 RADEX ABDOMEN 1 VIEW: CPT

## 2023-07-21 PROCEDURE — 99284 EMERGENCY DEPT VISIT MOD MDM: CPT

## 2023-07-21 ASSESSMENT — LIFESTYLE VARIABLES
HOW MANY STANDARD DRINKS CONTAINING ALCOHOL DO YOU HAVE ON A TYPICAL DAY: PATIENT DOES NOT DRINK
HOW OFTEN DO YOU HAVE A DRINK CONTAINING ALCOHOL: NEVER

## 2023-07-21 ASSESSMENT — PAIN - FUNCTIONAL ASSESSMENT: PAIN_FUNCTIONAL_ASSESSMENT: NONE - DENIES PAIN

## 2023-07-21 NOTE — ED NOTES
Patient NG tube leaking fluid when wife tried to flush at home. NG did flush here in ED when trying to flush insure, it was resistant. Spoke with Dr. Fanny Fernando, he said to pull back two inches and re-xray.        Tapan Krishna RN  07/21/23 7595

## 2023-07-21 NOTE — ED NOTES
Dr Wu Mcgovern made aware that the smallest size ng tube we have is 10fr. We do not have the same kind of tube that the pt has with him currently.  This RN was told to place items at bedside and she would speak with pt and wife who is at bedside     Awilda Noonan, [address] [place] [address]  07/21/23 5816

## 2023-07-21 NOTE — ED NOTES
Report given to [facility] RN. No questions at this time.      Poornima Montero RN  07/21/23 3089

## 2023-07-21 NOTE — ED PROVIDER NOTES
[address]        Pt Name: Jany Farah  MRN: [id number]  [address] [facility] 1955  Date of evaluation: 7/21/2023  Provider: Daniel Mallory DO  PCP: Preston Soto MD  Note Started: 5:57 AM EDT 7/21/23    CHIEF COMPLAINT       Chief Complaint   Patient presents with    Feeding Tube Problem     Pt comes to the ED with c/o dislodged feeding tube. Pt has pieces of tube with him. HISTORY OF PRESENT ILLNESS: 1 or more Elements   History From: Patient    Limitations to history : None    Jany Farah is a 77 y.o. male with past medical history of cancer of the oral cavity, hyperlipidemia and hypertension who presents to the emergency department due to. Acute problem. Patient states that he had a feeding tube that was placed while he was hospitalized after a recent procedure. Patient was at the [facility] for mass and lymph node resection from his right neck. Patient has a history of oral neck cancer and had initial resection done last November by Dr. Rodrigo Dyer on the left side. Recent PET scan showed mass on the right as well as lymphadenopathy which is why he had his recent procedure. Patient apparently is failed swallow study at the hospital and a feeding tube was placed at that point. Patient states that he dislodged his feeding tube overnight by accident. Family called nursing hotline who advised patient come to the emergency department for further assessment. [place] [facility] Patient is denying any associated symptoms including nausea, vomiting, fever, chills, headache, lightheadedness, syncope, chest pain, shortness of breath, abdominal pain, urinary symptoms, constipation or diarrhea. On initial evaluation, patient is nontoxic-appearing and in no acute distress. Patient denies any recent choking or concerns for aspiration.   He states that he has had nothing by mouth change or worsen. If his pain persists, pt may need further evaluation. Pt is agreeable to plan and all questions have been answered at this time. 1. Complication of feeding tube ([address])    2. Encounter for nasogastric (NG) tube placement        Re-Evaluations/Consultations:             ED Course as of 07/21/23 0917   Fri Jul 21, 2023   0950   ATTENDING PROVIDER ATTESTATION:     I have personally performed and/or participated in the history, exam, medical decision making, and procedures and agree with all pertinent clinical information unless otherwise noted. I have also reviewed and agree with the past medical, family and social history unless otherwise noted. I have discussed this patient in detail with the resident and provided the instruction and education regarding the evidence-based evaluation and treatment of dislodged feeding tube. Any EKG that may have been performed has been personally reviewed by me and I agree with the documentation as noted by the resident. History: Patient presented to the ED for evaluation of a dislodged feeding tube. He believes it got pulled out during the night when he was rolling over. Patient had recent neck surgery and failed a swallow study. Scheduled to have a repeat swallow study next week. Patient has no other complaints at this time. My findings: James Aj is a 77 y.o. male whom is in no distress. Physical exam reveals patient sitting at the bedside no distress. Obvious postsurgical changes on his neck on the right. Patient has no signs of respiratory distress. He is mildly tachycardic. No diaphoresis or pallor. My plan: Symptomatic and supportive care. Consult ENT    Electronically signed by Merari Michel DO on 7/21/23 at 6:27 AM EDT       [MS]   [id number] I reevaluated the patient. He is feeling fine currently. I did discuss plan for recommendations from on-[facility] ENT [facility].   Family states that they would be okay with

## 2023-07-21 NOTE — ED PROVIDER NOTES
[address]        Pt Name: Khushbu Toro  MRN: [id number]  [address] 1955  Date of evaluation: 7/21/2023  Provider: Maricel Ge DO  PCP: Parris Hampton MD  Note Started: 1:20 PM EDT 7/21/23    CHIEF COMPLAINT       Chief Complaint   Patient presents with    Other     Had NG tube placed in ER this morning and is not working properly per wife. HISTORY OF PRESENT ILLNESS: 1 or more Elements   History From: Patient     Limitations to history : None    Khushbu Toro is a 77 y.o. male with past medical history of cancer of the oral cavity, hyperlipidemia and hypertension who presents to the emergency department due to NG tube problem. Patient was in the emergency department earlier today for NG tube placement. Since coming home, patient's NG tube has not been flushing well so he returned the ED for further evaluation. Wife is at bedside and states that she did crush meds and put it through the NG tube in water was flushing fine. When she tried to give Ensure through the tube it was very hard to push with a lot of resistance. Patient denies any associated symptoms including nausea, vomiting, fever, chills, headache, lightheadedness, syncope, chest pain, shortness of breath, abdominal pain, urinary or bowel changes. On initial assessment, patient is nontoxic-appearing and in no acute distress. Nursing Notes were all reviewed and agreed with or any disagreements were addressed in the HPI.    ROS:   Pertinent positives and negatives are stated within HPI, all other systems reviewed and are negative.    --------------------------------------------- PAST HISTORY ---------------------------------------------  Past Medical History:  has a past medical history of Acid reflux disease, Cancer of oral cavity ([address]), Hyperlipidemia, and Hypertension.     Past Surgical History:  has a past surgical history that Re-Evaluations/Consultations:             ED Course as of 07/21/23 1320   Fri Jul 21, 2023   [id number] [facility] [address]:     I have personally performed and/or participated in the history, exam, medical decision making, and procedures and agree with all pertinent clinical information unless otherwise noted. I have also reviewed and agree with the past medical, family and social history unless otherwise noted. I have discussed this patient in detail with the resident and provided the instruction and education regarding the evidence-based evaluation and treatment of NG tube complication. Any EKG that may have been performed has been personally reviewed by me and I agree with the documentation as noted by the resident. History: Patient was seen here earlier this morning for NG tube placement. His NG tube has been dislodged. Another 1 was placed this morning and he was discharged home. When they are at home they were trying to administer his tube feed but it would not flush. Patient reports no pain. My findings: James Aj is a 77 y.o. male whom is in no distress. Physical exam reveals patient sitting in bed comfortably. Nurses able to flush saline into the NG tube without difficulty. Having some difficulty flushing tube feed. My plan: Symptomatic and supportive care. We will pull NG tube back and repeat image for placement. Electronically signed by Merari Michel DO on 7/21/23 at 1:00 PM EDT       [MS]   [id number] Patient reevaluated. He is feeling fine currently. Patient aware that chest x-ray showing proper positioning of NG tube. NG-tube is flushing more easily now after repositioning. Patient agreeable with discharge after shared decision making. He will follow-up with specialist for replacement of tube if needed.  [PP]      ED Course User Index  [MS] Merari Michel DO  [PP] Tricia Blake DO         This patient's ED course included: History, physical

## 2023-07-21 NOTE — ED NOTES
Dr Nancy Tejeda told this RN to hold off on placing the NG tube due to him needing to speak with the surgeon that originally placed the feeding tube. All items are still at bedside.       Chavo Lovelace RN  07/21/23 8205

## 2023-08-29 ENCOUNTER — TELEPHONE (OUTPATIENT)
Dept: OTOLARYNGOLOGY | Age: 68
End: 2023-08-29

## 2023-08-29 RX ORDER — OMEPRAZOLE 20 MG/1
40 CAPSULE, DELAYED RELEASE ORAL
Qty: 60 CAPSULE | Refills: 2 | Status: SHIPPED | OUTPATIENT
Start: 2023-08-29 | End: 2023-11-27

## (undated) DEVICE — GLOVE ORANGE PI 7 1/2   MSG9075

## (undated) DEVICE — SPONGE,PEANUT,XRAY,ST,SM,3/8",5/CARD: Brand: MEDLINE INDUSTRIES, INC.

## (undated) DEVICE — BANDAGE,GAUZE,4.5"X4.1YD,STERILE,LF: Brand: MEDLINE

## (undated) DEVICE — TIP COVER ACCESSORY

## (undated) DEVICE — Device

## (undated) DEVICE — ARM DRAPE

## (undated) DEVICE — PAD,NON-ADHERENT,2X3,STERILE,LF,1/PK: Brand: MEDLINE

## (undated) DEVICE — SPONGE LAP W18XL18IN WHT COT 4 PLY FLD STRUNG RADPQ DISP ST

## (undated) DEVICE — DECANTER BAG 9": Brand: MEDLINE INDUSTRIES, INC.

## (undated) DEVICE — CANNULA SEAL

## (undated) DEVICE — LOOP VES W25MM THK1MM MAXI RED SIL FLD REPELLENT 100 PER

## (undated) DEVICE — ADAPTER CLEANING PORPOISE CLEANING

## (undated) DEVICE — BAG PRSS INFUS IV OR ART 3000 CC W STPCOCK NO THUMBWHEEL W

## (undated) DEVICE — COVER,MAYO STAND,STERILE: Brand: MEDLINE

## (undated) DEVICE — SOLUTION IV IRRIG POUR BRL 0.9% SODIUM CHL 2F7124

## (undated) DEVICE — CORD,CAUTERY,BIPOLAR,STERILE: Brand: MEDLINE

## (undated) DEVICE — GARMENT,MEDLINE,DVT,INT,CALF,MED, GEN2: Brand: MEDLINE

## (undated) DEVICE — HEAD AND NECK: Brand: MEDLINE INDUSTRIES, INC.

## (undated) DEVICE — MEDI-VAC NON-CONDUCTIVE SUCTION TUBING: Brand: CARDINAL HEALTH

## (undated) DEVICE — STAPLER SKIN L39MM DIA0.53MM CRWN 5.7MM S STL FIX HD PROX

## (undated) DEVICE — MARKER SURG SKIN FULL SZ PUR TRAD INK REGULAR ULTRA FN TWIN

## (undated) DEVICE — GUARD PROTECTOR EYE ADLT ST

## (undated) DEVICE — GLOVE SURG SZ 65 THK91MIL LTX FREE SYN POLYISOPRENE

## (undated) DEVICE — SCRUB DRY SURG EZ SCRUB BRUSH PREOPERATIVE GRN

## (undated) DEVICE — KIT,ANTI FOG,W/SPONGE & FLUID,SOFT PACK: Brand: MEDLINE

## (undated) DEVICE — MARKER,SKIN,WI/RULER AND LABELS: Brand: MEDLINE

## (undated) DEVICE — BLOCK BITE 60FR CAREGUARD

## (undated) DEVICE — GAUZE,SPONGE,4"X4",8PLY,STRL,LF,10/TRAY: Brand: MEDLINE

## (undated) DEVICE — DUAL LUMEN STOMACH TUBE: Brand: SALEM SUMP

## (undated) DEVICE — 6 X 9  1.75MIL 4-WALL LABGUARD: Brand: MINIGRIP COMMERCIAL LLC

## (undated) DEVICE — GOWN ISOLATN REG YEL M WT MULTIPLY SIDETIE LEV 2

## (undated) DEVICE — TUBING, SUCTION, 3/16" X 12', STRAIGHT: Brand: MEDLINE

## (undated) DEVICE — NEEDLE SYR 18GA L1.5IN RED PLAS HUB S STL BLNT FILL W/O

## (undated) DEVICE — KIT SURG W7XL11IN 2 PKT UNTREATED NA

## (undated) DEVICE — PAPER FILTER 1 32CM

## (undated) DEVICE — BINDER ABD M/L H12IN FOR 46-62IN WHT 4 SLD PNL DSGN HOOP

## (undated) DEVICE — DRESSING SIL W4XL5IN ANTIBACT GELLING FBR CYTOFORM

## (undated) DEVICE — TOWEL,OR,DSP,ST,BLUE,STD,6/PK,12PK/CS: Brand: MEDLINE

## (undated) DEVICE — MASK,FACE,MAXFLUIDPROTECT,SHIELD/ERLPS: Brand: MEDLINE

## (undated) DEVICE — KENDALL 450 SERIES MONITORING FOAM ELECTRODE - RECTANGULAR SHAPE ( 3/PK): Brand: KENDALL

## (undated) DEVICE — SYRINGE MED 10ML LUERLOCK TIP W/O SFTY DISP

## (undated) DEVICE — APPLIER LIG CLP M L11IN TI STR RNG HNDL FOR 20 CLP DISP

## (undated) DEVICE — DRESSING TRNSPAR W8XL12IN FLM SURESITE 123

## (undated) DEVICE — PREMIUM WET SKIN PREP TRAY: Brand: MEDLINE INDUSTRIES, INC.

## (undated) DEVICE — GOWN,SIRUS,FABRNF,XL,20/CS: Brand: MEDLINE

## (undated) DEVICE — KIT BEDSIDE REVITAL OX 500ML

## (undated) DEVICE — SPONGE,NEURO,0.5"X1.5",XR,STRL,LF,10/PK: Brand: MEDLINE

## (undated) DEVICE — COUNTER NDL 30 COUNT DBL MAG

## (undated) DEVICE — TUBE TRACH CUF 7.5X10.8X74 MM 6.5 MM FLX SHILEY REUSE

## (undated) DEVICE — CONTAINER SPEC COLL 960ML POLYPR TRIANG GRAD INTAKE/OUTPUT

## (undated) DEVICE — DISSECTOR ENDOSCP L21CM TIP CURVATURE 40DEG FN CRV JAW VES

## (undated) DEVICE — SPONGE EYE L7CM NAT CELOS SPEAR VISITEC VISISPEAR

## (undated) DEVICE — DRESSING,GAUZE,XEROFORM,CURAD,5"X9",ST: Brand: CURAD

## (undated) DEVICE — COLUMN DRAPE

## (undated) DEVICE — Device: Brand: DEFENDO VALVE AND CONNECTOR KIT

## (undated) DEVICE — SYRINGE IRRIG 60ML SFT PLIABLE BLB EZ TO GRP 1 HND USE W/

## (undated) DEVICE — SPONGE GZ 4IN 4IN 4 PLY N WVN AVANT

## (undated) DEVICE — CATHETER,URETHRAL,REDRUBBER,STRL,12FR: Brand: MEDLINE INDUSTRIES, INC.

## (undated) DEVICE — SYRINGE,EAR/ULCER, 3 OZ, STERILE: Brand: MEDLINE

## (undated) DEVICE — SOLUTION IRRIG 1000ML 0.9% SOD CHL USP POUR PLAS BTL

## (undated) DEVICE — MEDI-VAC YANKAUER SUCTION HANDLE W/BULBOUS TIP: Brand: CARDINAL HEALTH

## (undated) DEVICE — HOOK RETRCT 12MM S STL BLNT E STAY LONE STAR

## (undated) DEVICE — TONSIL SPONGES: Brand: DEROYAL

## (undated) DEVICE — E-Z CLEAN, NON-STICK, PTFE COATED, ELECTROSURGICAL BLADE ELECTRODE, MODIFIED EXTENDED INSULATION, 6.5 INCH (16.5 CM): Brand: MEGADYNE

## (undated) DEVICE — TOWEL,OR,DSP,ST,BLUE,DLX,10/PK,8PK/CS: Brand: MEDLINE

## (undated) DEVICE — SYRINGE 20ML LL S/C 50

## (undated) DEVICE — DRAPE WARMER SLUSH 66X44IN

## (undated) DEVICE — PADDING,UNDERCAST,COTTON, 4"X4YD STERILE: Brand: MEDLINE

## (undated) DEVICE — PERMANENT CAUTERY SPATULA: Brand: ENDOWRIST

## (undated) DEVICE — GOWN,SIRUS,FABRNF,L,20/CS: Brand: MEDLINE

## (undated) DEVICE — YANKAUER,OPEN TIP,W/O VENT,STERILE: Brand: MEDLINE INDUSTRIES, INC.

## (undated) DEVICE — LUBRICANT SURG JELLY ST BACTER TUBE 4.25OZ

## (undated) DEVICE — INTENDED FOR TISSUE SEPARATION, AND OTHER PROCEDURES THAT REQUIRE A SHARP SURGICAL BLADE TO PUNCTURE OR CUT.: Brand: BARD-PARKER ® STAINLESS STEEL BLADES

## (undated) DEVICE — DRAIN SURG W10MMXL20CM SIL FULL PERF HUBLESS FLAT RADPQ

## (undated) DEVICE — DRAPE,REIN 53X77,STERILE: Brand: MEDLINE